# Patient Record
Sex: MALE | Race: WHITE | NOT HISPANIC OR LATINO | Employment: FULL TIME | URBAN - METROPOLITAN AREA
[De-identification: names, ages, dates, MRNs, and addresses within clinical notes are randomized per-mention and may not be internally consistent; named-entity substitution may affect disease eponyms.]

---

## 2017-03-03 ENCOUNTER — TELEPHONE (OUTPATIENT)
Dept: NEUROSURGERY | Facility: CLINIC | Age: 42
End: 2017-03-03

## 2017-03-03 DIAGNOSIS — M48.061 SPINAL STENOSIS, LUMBAR REGION, WITHOUT NEUROGENIC CLAUDICATION: ICD-10-CM

## 2017-03-03 DIAGNOSIS — Z98.890 POST-OPERATIVE STATE: Primary | ICD-10-CM

## 2017-03-03 NOTE — TELEPHONE ENCOUNTER
We have received a request from   for the patient to respond to questions regarding MMI/PPI related to his WC injury. His surgery was 3-6-16. We have received payment for this.    However, Dr. Mackay wants to see the patient in the office (now/soon) for evaluation prior to giving his final opinion of the patient's status with lumbar flex/extension xrays.     Appt made for 3-17-17 at 1:30 with Dr. Mackay.  Order in. Please notify patient of this appointment/xrays prior and contact his WC carrier to let them know of this final appointment.

## 2017-03-17 ENCOUNTER — HOSPITAL ENCOUNTER (OUTPATIENT)
Dept: GENERAL RADIOLOGY | Facility: HOSPITAL | Age: 42
Discharge: HOME | End: 2017-03-17
Attending: NEUROLOGICAL SURGERY | Admitting: NEUROLOGICAL SURGERY

## 2017-03-17 ENCOUNTER — OFFICE VISIT (OUTPATIENT)
Dept: NEUROSURGERY | Facility: CLINIC | Age: 42
End: 2017-03-17

## 2017-03-17 VITALS
HEART RATE: 72 BPM | BODY MASS INDEX: 33.88 KG/M2 | HEIGHT: 71 IN | WEIGHT: 242 LBS | DIASTOLIC BLOOD PRESSURE: 88 MMHG | SYSTOLIC BLOOD PRESSURE: 120 MMHG

## 2017-03-17 DIAGNOSIS — Z98.890 POST-OPERATIVE STATE: Primary | ICD-10-CM

## 2017-03-17 DIAGNOSIS — Z98.890 POST-OPERATIVE STATE: ICD-10-CM

## 2017-03-17 DIAGNOSIS — M51.9 LUMBOSACRAL DISC DISEASE: ICD-10-CM

## 2017-03-17 DIAGNOSIS — M48.061 SPINAL STENOSIS, LUMBAR REGION, WITHOUT NEUROGENIC CLAUDICATION: ICD-10-CM
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
## 2017-03-17 PROCEDURE — 72114 X-RAY EXAM L-S SPINE BENDING: CPT

## 2017-03-17 PROCEDURE — 99212 OFFICE O/P EST SF 10 MIN: CPT | Performed by: NEUROLOGICAL SURGERY

## 2017-03-17 NOTE — PROGRESS NOTES
Subjective   Patient ID: Pranav Calderon is a 41 y.o. male who is here today for follow-up for a work comp injury. One year MMI. He has a lumbar xray today at [facility]. He presents unaccompanied for this visit today.     History of Present Illness     The patient returns to the office now approximately 1 year following a L4 5 lumbar decompression and interbody fusion with posterior instrumentation.  He operatively has severe low back pain and severe pain in the right lower extremity.  He has had complete resolution of leg pain.  He notes that he has most of the time at least 9/10 of the time good resolution of low back pain but after being up for a full day of work isn't an walking he has some back pain.  He rates the back pain intermittently worse as 3 out of 10.  This pain is not present on a daily basis.  It does not significantly impair his ability to conduct his daily activities.  He is working full time at full duty.    The following portions of the patient's history were reviewed and updated as appropriate: allergies, current medications, past family history, past medical history, past social history, past surgical history and problem list.    Review of Systems   Musculoskeletal: Negative for back pain.   Neurological: Negative for numbness.   Psychiatric/Behavioral: Negative for sleep disturbance.       Objective   Physical Exam   Constitutional: He is oriented to person, place, and time. He appears well-developed and well-nourished. He is cooperative.   HENT:   Head: Normocephalic and atraumatic.   Eyes: EOM are normal. Pupils are equal, round, and reactive to light. No scleral icterus.   Neck: Normal range of motion. Neck supple.   Cardiovascular: Normal rate, regular rhythm and intact distal pulses.    No murmur heard.  Pulmonary/Chest: Effort normal and breath sounds normal.   Abdominal: Soft. Bowel sounds are normal. There is no tenderness.   Musculoskeletal:        Lumbar back: He exhibits  decreased range of motion.        Back:    Neurological: He is alert and oriented to person, place, and time. He has normal strength. He displays no atrophy. No cranial nerve deficit or sensory deficit. He exhibits normal muscle tone. He displays a negative Romberg sign. Coordination and gait normal. GCS eye subscore is 4. GCS verbal subscore is 5. GCS motor subscore is 6.   Reflex Scores:       Tricep reflexes are 2+ on the right side and 2+ on the left side.       Bicep reflexes are 2+ on the right side and 2+ on the left side.       Brachioradialis reflexes are 2+ on the right side and 2+ on the left side.       Patellar reflexes are 2+ on the right side and 2+ on the left side.       Achilles reflexes are 2+ on the right side and 2+ on the left side.  Negative Milner and clonus  Finger to nose intact   Heel to shin intact  Able to tandem walk  Able to walk on heels and toes   Skin: Skin is warm, dry and intact.   Psychiatric: He has a normal mood and affect. His speech is normal and behavior is normal. Judgment and thought content normal. Cognition and memory are normal.   Vitals reviewed.    Neurologic Exam     Mental Status   Oriented to person, place, and time.   Speech: speech is normal     Cranial Nerves     CN III, IV, VI   Pupils are equal, round, and reactive to light.  Extraocular motions are normal.     Motor Exam     Strength   Strength 5/5 throughout.     Gait, Coordination, and Reflexes     Reflexes   Right brachioradialis: 2+  Left brachioradialis: 2+  Right biceps: 2+  Left biceps: 2+  Right triceps: 2+  Left triceps: 2+  Right patellar: 2+  Left patellar: 2+  Right achilles: 2+  Left achilles: 2+      Assessment/Plan   Independent Review of Radiographic Studies:    X-rays of the lumbar spine were accomplished today including flexion extension and I personally reviewed than: These demonstrate spinal instrumentation at L4 5 with an interbody prosthetic device.  There is ongoing interbody fusion.   There is no evidence of spinal instability.  Medical Decision Making:    Patient is doing remarkably well.  In my opinion he has reached maximal medical improvement.  A permanent partial impairment rating may be requested.  He has no permanent restrictions.    Pranav was seen today for back pain.    Diagnoses and all orders for this visit:    Post-operative state    Lumbosacral disc disease    Work related injury    Return if symptoms worsen or fail to improve.

## 2018-03-16 ENCOUNTER — OFFICE VISIT CONVERTED (OUTPATIENT)
Dept: FAMILY MEDICINE CLINIC | Age: 43
End: 2018-03-16
Attending: FAMILY MEDICINE

## 2018-10-22 ENCOUNTER — OFFICE VISIT CONVERTED (OUTPATIENT)
Dept: FAMILY MEDICINE CLINIC | Age: 43
End: 2018-10-22
Attending: NURSE PRACTITIONER

## 2020-08-04 ENCOUNTER — OFFICE VISIT CONVERTED (OUTPATIENT)
Dept: FAMILY MEDICINE CLINIC | Age: 45
End: 2020-08-04
Attending: NURSE PRACTITIONER

## 2020-08-05 ENCOUNTER — HOSPITAL ENCOUNTER (OUTPATIENT)
Dept: OTHER | Facility: HOSPITAL | Age: 45
Discharge: HOME | End: 2020-08-05
Attending: NURSE PRACTITIONER

## 2020-08-05 LAB
ERYTHROCYTE [DISTWIDTH] IN BLOOD BY AUTOMATED COUNT: 11.9 % (ref 11.5–14.5)
HBA1C MFR BLD: 16.3 G/DL (ref 14–18)
HCT VFR BLD AUTO: 48.7 % (ref 42–52)
MCH RBC QN AUTO: 31.2 PG (ref 27–31)
MCHC RBC AUTO-ENTMCNC: 33.5 G/DL (ref 33–37)
MCV RBC AUTO: 93.3 FL (ref 80–96)
PLATELET # BLD AUTO: 199 10*3/UL (ref 130–400)
PMV BLD AUTO: 11.4 FL (ref 7.4–10.4)
RBC # BLD AUTO: 5.22 10*6/UL (ref 4.7–6.1)
WBC # BLD AUTO: 5.98 10*3/UL (ref 4.8–10.8)

## 2020-08-06 LAB
ALBUMIN SERPL-MCNC: 4.2 G/DL (ref 3.5–5)
ALBUMIN/GLOB SERPL: 1.4 {RATIO} (ref 1.4–2.6)
ALP SERPL-CCNC: 43 U/L (ref 53–128)
ALT SERPL-CCNC: 70 U/L (ref 10–40)
ANION GAP SERPL CALC-SCNC: 22 MMOL/L (ref 8–19)
AST SERPL-CCNC: 51 U/L (ref 15–50)
BILIRUB SERPL-MCNC: 0.43 MG/DL (ref 0.2–1.3)
BUN SERPL-MCNC: 11 MG/DL (ref 5–25)
BUN/CREAT SERPL: 11 {RATIO} (ref 6–20)
CALCIUM SERPL-MCNC: 10.1 MG/DL (ref 8.7–10.4)
CHLORIDE SERPL-SCNC: 102 MMOL/L (ref 99–111)
CHOLEST SERPL-MCNC: 172 MG/DL (ref 107–200)
CHOLEST/HDLC SERPL: 3.3 {RATIO} (ref 3–6)
CONV CO2: 22 MMOL/L (ref 22–32)
CONV TOTAL PROTEIN: 7.3 G/DL (ref 6.3–8.2)
CREAT UR-MCNC: 0.96 MG/DL (ref 0.7–1.2)
GFR SERPLBLD BASED ON 1.73 SQ M-ARVRAT: >60 ML/MIN/{1.73_M2}
GLOBULIN UR ELPH-MCNC: 3.1 G/DL (ref 2–3.5)
GLUCOSE SERPL-MCNC: 91 MG/DL (ref 70–99)
HDLC SERPL-MCNC: 52 MG/DL (ref 40–60)
LDLC SERPL CALC-MCNC: 95 MG/DL (ref 70–100)
OSMOLALITY SERPL CALC.SUM OF ELEC: 291 MOSM/KG (ref 273–304)
POTASSIUM SERPL-SCNC: 4.6 MMOL/L (ref 3.5–5.3)
SODIUM SERPL-SCNC: 141 MMOL/L (ref 135–147)
TRIGL SERPL-MCNC: 125 MG/DL (ref 40–150)
TSH SERPL-ACNC: 2.01 M[IU]/L (ref 0.27–4.2)
VLDLC SERPL-MCNC: 25 MG/DL (ref 5–37)

## 2020-10-08 ENCOUNTER — CONVERSION ENCOUNTER (OUTPATIENT)
Dept: FAMILY MEDICINE CLINIC | Age: 45
End: 2020-10-08

## 2020-10-08 ENCOUNTER — HOSPITAL ENCOUNTER (OUTPATIENT)
Dept: OTHER | Facility: HOSPITAL | Age: 45
Discharge: HOME | End: 2020-10-08
Attending: FAMILY MEDICINE

## 2020-10-12 LAB — SARS-COV-2 RNA SPEC QL NAA+PROBE: DETECTED

## 2020-10-21 ENCOUNTER — LAB REQUISITION (OUTPATIENT)
Dept: LAB | Facility: HOSPITAL | Age: 45
End: 2020-10-21

## 2020-10-21 DIAGNOSIS — Z00.00 ENCOUNTER FOR GENERAL ADULT MEDICAL EXAMINATION WITHOUT ABNORMAL FINDINGS: ICD-10-CM

## 2020-10-29 ENCOUNTER — OFFICE VISIT CONVERTED (OUTPATIENT)
Dept: FAMILY MEDICINE CLINIC | Age: 45
End: 2020-10-29
Attending: NURSE PRACTITIONER

## 2020-11-10 ENCOUNTER — OFFICE VISIT CONVERTED (OUTPATIENT)
Dept: CARDIOLOGY | Facility: CLINIC | Age: 45
End: 2020-11-10
Attending: INTERNAL MEDICINE

## 2020-11-19 ENCOUNTER — CONVERSION ENCOUNTER (OUTPATIENT)
Dept: CARDIOLOGY | Facility: CLINIC | Age: 45
End: 2020-11-19
Attending: INTERNAL MEDICINE

## 2021-01-07 ENCOUNTER — LAB REQUISITION (OUTPATIENT)
Dept: LAB | Facility: HOSPITAL | Age: 46
End: 2021-01-07

## 2021-01-07 DIAGNOSIS — Z00.00 ENCOUNTER FOR GENERAL ADULT MEDICAL EXAMINATION WITHOUT ABNORMAL FINDINGS: ICD-10-CM

## 2021-01-09 PROCEDURE — U0004 COV-19 TEST NON-CDC HGH THRU: HCPCS | Performed by: PODIATRIST

## 2021-01-11 LAB — SARS-COV-2 RNA RESP QL NAA+PROBE: NOT DETECTED

## 2021-01-28 ENCOUNTER — PREP FOR SURGERY (OUTPATIENT)
Dept: SURGERY | Facility: SURGERY CENTER | Age: 46
End: 2021-01-28

## 2021-01-28 DIAGNOSIS — M72.2 PLANTAR FASCIITIS: Primary | ICD-10-CM

## 2021-02-02 NOTE — H&P (VIEW-ONLY)
Patient Care Team:  Fauzia Ballard MD as PCP - General (Family Medicine)  Meri Alex MD as Consulting Physician (Physical Medicine and Rehabilitation)  Celestino Mackay MD as Surgeon (Neurosurgery)    Chief complaint Heel pain left    Subjective     1+ year history of heel pain L non responsive to conservative care      Review of Systems   Constitutional: Negative.    HENT: Negative.    Eyes: Negative.    Respiratory: Negative.    Cardiovascular: Negative.    Gastrointestinal: Negative.    Endocrine: Negative.    Genitourinary: Negative.    Skin: Negative.    Allergic/Immunologic: Negative.    Neurological: Negative.    Hematological: Negative.    Psychiatric/Behavioral: Negative.         Past History:  Medical History: has a past medical history of Anxiety, Back pain, Depression, Lumbar herniated disc, Lumbosacral disc disease, Obesity, Obstructive sleep apnea, Right lumbar radiculopathy, and Work related injury (10/30/2014).   Surgical History: has a past surgical history that includes Spine surgery; [place] tooth extraction; Lumbar fusion (Right, 3/9/2016); and Lumbar Decompression (03/09/2016).   Family History: family history includes Diabetes in his father.   Social History: reports that he has never smoked. He has never used smokeless tobacco. He reports that he does not drink alcohol or use drugs.    (Not in a hospital admission)     Allergies: Patient has no known allergies.    Objective      Vital Signs  BP: ()/()   Arterial Line BP: ()/()     Physical Exam  HENT:      Head: Normocephalic and atraumatic.   Neck:      Musculoskeletal: Normal range of motion and neck supple.   Cardiovascular:      Rate and Rhythm: Normal rate and regular rhythm.   Pulmonary:      Effort: Pulmonary effort is normal.      Breath sounds: Normal breath sounds.   Musculoskeletal: Normal range of motion.   Feet:      Comments: + POP L heel  Skin:     General: Skin is warm and dry.      Capillary Refill:  Capillary refill takes less than 2 seconds.   Neurological:      General: No focal deficit present.      Mental Status: He is alert and oriented to person, place, and time.         Results Review:   I reviewed the patient's new clinical results.      Assessment/Plan       * No active hospital problems. *      Assessment:  (Plantar fasciitis Left).     Plan:   (EPF left).       I discussed the patients findings and my recommendations with patient    Rafy Bauer DPM  02/02/21  08:44 EST    Time: Discharge 10 min

## 2021-02-02 NOTE — H&P
Patient Care Team:  Fauzia Ballard MD as PCP - General (Family Medicine)  Meri Alex MD as Consulting Physician (Physical Medicine and Rehabilitation)  Celestino Mackay MD as Surgeon (Neurosurgery)    Chief complaint Heel pain left    Subjective     1+ year history of heel pain L non responsive to conservative care      Review of Systems   Constitutional: Negative.    HENT: Negative.    Eyes: Negative.    Respiratory: Negative.    Cardiovascular: Negative.    Gastrointestinal: Negative.    Endocrine: Negative.    Genitourinary: Negative.    Skin: Negative.    Allergic/Immunologic: Negative.    Neurological: Negative.    Hematological: Negative.    Psychiatric/Behavioral: Negative.         Past History:  Medical History: has a past medical history of Anxiety, Back pain, Depression, Lumbar herniated disc, Lumbosacral disc disease, Obesity, Obstructive sleep apnea, Right lumbar radiculopathy, and Work related injury (10/30/2014).   Surgical History: has a past surgical history that includes Spine surgery; [place] tooth extraction; Lumbar fusion (Right, 3/9/2016); and Lumbar Decompression (03/09/2016).   Family History: family history includes Diabetes in his father.   Social History: reports that he has never smoked. He has never used smokeless tobacco. He reports that he does not drink alcohol or use drugs.    (Not in a hospital admission)     Allergies: Patient has no known allergies.    Objective      Vital Signs  BP: ()/()   Arterial Line BP: ()/()     Physical Exam  HENT:      Head: Normocephalic and atraumatic.   Neck:      Musculoskeletal: Normal range of motion and neck supple.   Cardiovascular:      Rate and Rhythm: Normal rate and regular rhythm.   Pulmonary:      Effort: Pulmonary effort is normal.      Breath sounds: Normal breath sounds.   Musculoskeletal: Normal range of motion.   Feet:      Comments: + POP L heel  Skin:     General: Skin is warm and dry.      Capillary Refill:  Capillary refill takes less than 2 seconds.   Neurological:      General: No focal deficit present.      Mental Status: He is alert and oriented to person, place, and time.         Results Review:   I reviewed the patient's new clinical results.      Assessment/Plan       * No active hospital problems. *      Assessment:  (Plantar fasciitis Left).     Plan:   (EPF left).       I discussed the patients findings and my recommendations with patient    Rafy Bauer DPM  02/02/21  08:44 EST    Time: Discharge 10 min

## 2021-02-06 ENCOUNTER — LAB (OUTPATIENT)
Dept: LAB | Facility: SURGERY CENTER | Age: 46
End: 2021-02-06

## 2021-02-06 ENCOUNTER — TRANSCRIBE ORDERS (OUTPATIENT)
Dept: SURGERY | Facility: SURGERY CENTER | Age: 46
End: 2021-02-06

## 2021-02-06 DIAGNOSIS — Z01.818 OTHER SPECIFIED PRE-OPERATIVE EXAMINATION: Primary | ICD-10-CM

## 2021-02-06 DIAGNOSIS — Z01.818 OTHER SPECIFIED PRE-OPERATIVE EXAMINATION: ICD-10-CM

## 2021-02-06 LAB — SARS-COV-2 ORF1AB RESP QL NAA+PROBE: NOT DETECTED

## 2021-02-06 PROCEDURE — U0004 COV-19 TEST NON-CDC HGH THRU: HCPCS | Performed by: SURGERY

## 2021-02-06 PROCEDURE — C9803 HOPD COVID-19 SPEC COLLECT: HCPCS

## 2021-02-08 NOTE — PAT
Education provided the Patient on the following:    - Nothing to Eat or Drink after MN the night before the procedure  -Your required COVID Test is Scheduled on          Between the Hours of 9651-1084  -You will only be notified if your COVID test Result is POSITIVE  -The importance of reducing your number of contacts by self quarantining after you COVID test until the date of your Surgery  -You will need to have someone drive you home after your Surgery and remain with you for 24 hours after the EGD  - The date of your Surgery, your ride is welcome to either remain in our parking lot or within 10-15 minutes of [employer]  -Please wear warm socks when you arrive for your Surgery  -Remove all jewelry and leave any valuables before arriving on the date of your procedure (all will have to be removed before leaving preop)  -You will need to arrive at 0600  on  2/9/21 for your Surgery  -Feel free to contact us at: [phone] with any additional questions/concerns      Patient states he had COVID 10/9/20

## 2021-02-09 ENCOUNTER — HOSPITAL ENCOUNTER (OUTPATIENT)
Facility: SURGERY CENTER | Age: 46
Setting detail: HOSPITAL OUTPATIENT SURGERY
Discharge: HOME | End: 2021-02-09
Attending: PODIATRIST | Admitting: PODIATRIST

## 2021-02-09 ENCOUNTER — ANESTHESIA (OUTPATIENT)
Dept: SURGERY | Facility: SURGERY CENTER | Age: 46
End: 2021-02-09

## 2021-02-09 ENCOUNTER — ANESTHESIA EVENT (OUTPATIENT)
Dept: SURGERY | Facility: SURGERY CENTER | Age: 46
End: 2021-02-09

## 2021-02-09 VITALS
TEMPERATURE: 97.7 F | BODY MASS INDEX: 40.14 KG/M2 | WEIGHT: 271 LBS | OXYGEN SATURATION: 95 % | HEART RATE: 104 BPM | HEIGHT: 69 IN | SYSTOLIC BLOOD PRESSURE: 130 MMHG | DIASTOLIC BLOOD PRESSURE: 88 MMHG | RESPIRATION RATE: 20 BRPM

## 2021-02-09 DIAGNOSIS — M72.2 PLANTAR FASCIITIS, LEFT: Primary | ICD-10-CM

## 2021-02-09 PROCEDURE — 25010000002 MIDAZOLAM PER 1MG: Performed by: ANESTHESIOLOGY

## 2021-02-09 PROCEDURE — 25010000003 CEFAZOLIN PER 500 MG: Performed by: PODIATRIST

## 2021-02-09 PROCEDURE — 25010000002 PROPOFOL 10 MG/ML EMULSION: Performed by: ANESTHESIOLOGY

## 2021-02-09 PROCEDURE — 25010000003 LIDOCAINE 1 % SOLUTION: Performed by: PODIATRIST

## 2021-02-09 PROCEDURE — 29893 ENDOSCOPIC PLANTR FASCIOTOMY: CPT | Performed by: PODIATRIST

## 2021-02-09 RX ORDER — PROPOFOL 10 MG/ML
VIAL (ML) INTRAVENOUS AS NEEDED
Status: DISCONTINUED | OUTPATIENT
Start: 2021-02-09 | End: 2021-02-09 | Stop reason: SURG

## 2021-02-09 RX ORDER — MIDAZOLAM HYDROCHLORIDE 1 MG/ML
1 INJECTION INTRAMUSCULAR; INTRAVENOUS
Status: DISCONTINUED | OUTPATIENT
Start: 2021-02-09 | End: 2021-02-09 | Stop reason: HOSPADM

## 2021-02-09 RX ORDER — LIDOCAINE HYDROCHLORIDE 20 MG/ML
INJECTION, SOLUTION INFILTRATION; PERINEURAL AS NEEDED
Status: DISCONTINUED | OUTPATIENT
Start: 2021-02-09 | End: 2021-02-09 | Stop reason: SURG

## 2021-02-09 RX ORDER — CEFAZOLIN SODIUM 1 G/3ML
2 INJECTION, POWDER, FOR SOLUTION INTRAMUSCULAR; INTRAVENOUS ONCE
Status: COMPLETED | OUTPATIENT
Start: 2021-02-09 | End: 2021-02-09

## 2021-02-09 RX ORDER — MIDAZOLAM HYDROCHLORIDE 1 MG/ML
2 INJECTION INTRAMUSCULAR; INTRAVENOUS
Status: DISCONTINUED | OUTPATIENT
Start: 2021-02-09 | End: 2021-02-09 | Stop reason: HOSPADM

## 2021-02-09 RX ORDER — HYDROCODONE BITARTRATE AND ACETAMINOPHEN 5; 325 MG/1; MG/1
1-2 TABLET ORAL EVERY 4 HOURS PRN
Qty: 25 TABLET | Refills: 0
Start: 2021-02-09 | End: 2022-04-26

## 2021-02-09 RX ORDER — MAGNESIUM HYDROXIDE 1200 MG/15ML
LIQUID ORAL AS NEEDED
Status: DISCONTINUED | OUTPATIENT
Start: 2021-02-09 | End: 2021-02-09 | Stop reason: HOSPADM

## 2021-02-09 RX ORDER — FENTANYL CITRATE 50 UG/ML
50 INJECTION, SOLUTION INTRAMUSCULAR; INTRAVENOUS
Status: DISCONTINUED | OUTPATIENT
Start: 2021-02-09 | End: 2021-02-09 | Stop reason: HOSPADM

## 2021-02-09 RX ORDER — PROPOFOL 10 MG/ML
VIAL (ML) INTRAVENOUS CONTINUOUS PRN
Status: DISCONTINUED | OUTPATIENT
Start: 2021-02-09 | End: 2021-02-09 | Stop reason: SURG

## 2021-02-09 RX ORDER — SODIUM CHLORIDE, SODIUM LACTATE, POTASSIUM CHLORIDE, CALCIUM CHLORIDE 600; 310; 30; 20 MG/100ML; MG/100ML; MG/100ML; MG/100ML
9 INJECTION, SOLUTION INTRAVENOUS CONTINUOUS PRN
Status: DISCONTINUED | OUTPATIENT
Start: 2021-02-09 | End: 2021-02-09 | Stop reason: HOSPADM

## 2021-02-09 RX ORDER — BUPIVACAINE HYDROCHLORIDE 5 MG/ML
INJECTION, SOLUTION PERINEURAL AS NEEDED
Status: DISCONTINUED | OUTPATIENT
Start: 2021-02-09 | End: 2021-02-09 | Stop reason: HOSPADM

## 2021-02-09 RX ORDER — SODIUM CHLORIDE 0.9 % (FLUSH) 0.9 %
10 SYRINGE (ML) INJECTION AS NEEDED
Status: DISCONTINUED | OUTPATIENT
Start: 2021-02-09 | End: 2021-02-09 | Stop reason: HOSPADM

## 2021-02-09 RX ORDER — SODIUM CHLORIDE, SODIUM LACTATE, POTASSIUM CHLORIDE, CALCIUM CHLORIDE 600; 310; 30; 20 MG/100ML; MG/100ML; MG/100ML; MG/100ML
9 INJECTION, SOLUTION INTRAVENOUS CONTINUOUS
Status: DISCONTINUED | OUTPATIENT
Start: 2021-02-09 | End: 2021-02-09 | Stop reason: HOSPADM

## 2021-02-09 RX ORDER — LIDOCAINE HYDROCHLORIDE 10 MG/ML
INJECTION, SOLUTION INFILTRATION; PERINEURAL AS NEEDED
Status: DISCONTINUED | OUTPATIENT
Start: 2021-02-09 | End: 2021-02-09 | Stop reason: HOSPADM

## 2021-02-09 RX ORDER — SODIUM CHLORIDE 0.9 % (FLUSH) 0.9 %
10 SYRINGE (ML) INJECTION EVERY 12 HOURS SCHEDULED
Status: DISCONTINUED | OUTPATIENT
Start: 2021-02-09 | End: 2021-02-09 | Stop reason: HOSPADM

## 2021-02-09 RX ADMIN — PROPOFOL 100 MG: 10 INJECTION, EMULSION INTRAVENOUS at 07:36

## 2021-02-09 RX ADMIN — SODIUM CHLORIDE, POTASSIUM CHLORIDE, SODIUM LACTATE AND CALCIUM CHLORIDE 9 ML/HR: 600; 310; 30; 20 INJECTION, SOLUTION INTRAVENOUS at 06:29

## 2021-02-09 RX ADMIN — FAMOTIDINE 20 MG: 10 INJECTION INTRAVENOUS at 07:23

## 2021-02-09 RX ADMIN — MIDAZOLAM HYDROCHLORIDE 2 MG: 2 INJECTION, SOLUTION INTRAMUSCULAR; INTRAVENOUS at 07:24

## 2021-02-09 RX ADMIN — SODIUM CHLORIDE, SODIUM LACTATE, POTASSIUM CHLORIDE, AND CALCIUM CHLORIDE: .6; .31; .03; .02 INJECTION, SOLUTION INTRAVENOUS at 07:36

## 2021-02-09 RX ADMIN — CEFAZOLIN 2 G: 330 INJECTION, POWDER, FOR SOLUTION INTRAMUSCULAR; INTRAVENOUS at 07:39

## 2021-02-09 RX ADMIN — LIDOCAINE HYDROCHLORIDE 60 MG: 20 INJECTION, SOLUTION INFILTRATION; PERINEURAL at 07:36

## 2021-02-09 RX ADMIN — PROPOFOL 160 MCG/KG/MIN: 10 INJECTION, EMULSION INTRAVENOUS at 07:36

## 2021-02-09 NOTE — BRIEF OP NOTE
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

## 2021-02-09 NOTE — OP NOTE
The patient was brought into the operating room and placed on the operating room table in a supine position the patient was then placed under monitored anesthesia. A local block consisting of 20cc of a 1 to 1 mixture of 1% lidocaine plain and 0.5% Marcaine plain was then locally infiltrated.   The foot was in scrubbed prepped and draped in the usual aseptic manner. The foot was exsanguinated the pneumatic Ankle tourniquet was then inflated. Our attention was then directed to the medial aspect of the left foot. A 1 cm stab incision was then created 1 cm distal to the plantar fascial insertion into the calcaneus. Our dissection was continue to deep using both sharp and blunt dissection all bleeders were cauterized and located as necessary. A fascial elevator was then introduced and a fascial tunnel was created along the planter aspect of the planter fascia until lateral tenting was noted. A trocar and cannula were then placed into the tunnel until lateral tenting was noted. A second stab incision was made laterally. The trocar and cannula were passed through. The trocar was then removed the cannula was swabbed the camera was introduced the planter fascia was easily visualized. The medial 50% of the plantar fascia was then transacted using a triangle blade until the underlying muscle belly was observed with the foot held in a dorsiflex position. The cannula was rotated 180° no further plantar fascia elements were noted. The cannula was then flushed with normal saline solution. The trocar was re-introduced and the trocar and cannula were removed in one piece. The incisions were then co-opted with 5-0 nylon dry sterile dressing was applied. The foot was then casted with a fiberglass cast with the foot held at a 90° angle to the leg. The patient tolerated the procedure well and will be allowed to partial weight bear throughout the postoperative period and follow up with myself in the office in two weeks.

## 2021-02-09 NOTE — ANESTHESIA PREPROCEDURE EVALUATION
Anesthesia Evaluation                  Airway   Mallampati: I  TM distance: <3 FB  Neck ROM: full  no difficulty expected  Dental      Pulmonary - normal exam   (+) sleep apnea (NO CPAP),   Cardiovascular - normal exam        Neuro/Psych  (+) neuromuscular disease,     GI/Hepatic/Renal/Endo    (+) morbid obesity,      Musculoskeletal     Abdominal    Substance History      OB/GYN          Other                          Anesthesia Plan    ASA 3     MAC     intravenous induction     Anesthetic plan, all risks, benefits, and alternatives have been provided, discussed and informed consent has been obtained with: patient.

## 2021-02-09 NOTE — ANESTHESIA POSTPROCEDURE EVALUATION
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])    Surgeon: Rafy Bauer DPM Provider: Delmar Man MD    Anesthesia Type: MAC ASA Status: 3          Anesthesia Type: MAC    Vitals  Vitals Value Taken Time   /88 02/09/21 0838   Temp 36.5 °C (97.7 °F) 02/09/21 0823   Pulse 104 02/09/21 0838   Resp 20 02/09/21 0838   SpO2 95 % 02/09/21 0838           Post Anesthesia Care and Evaluation    Patient location during evaluation: bedside  Patient participation: complete - patient participated  Level of consciousness: awake and alert  Pain management: adequate  Airway patency: patent  Anesthetic complications: No anesthetic complications    Cardiovascular status: acceptable  Respiratory status: acceptable  Hydration status: acceptable    Comments: /88 (BP Location: Left arm, Patient Position: Sitting)   Pulse 104   Temp 36.5 °C (97.7 °F) (Temporal)   Resp 20   Ht 175.3 cm (69\")   Wt 123 kg (271 lb)   SpO2 95%   BMI 40.02 kg/m²       "

## 2021-05-10 NOTE — PROCEDURES
"   Procedure Note      Patient Name: Pranav Calderon   Patient ID: [id number]   Sex: Male   YOB: 1975    Primary Care Provider: Kristin MAGANA   Referring Provider: Kristin MAGANA    Visit Date: November 19, 2020    Provider: Thomas Block MD   Location: [facility]   Location Address: [address] [place] [address]  [address] [place] [address]  [id number]   Location Phone: [phone]          FINAL REPORT   TRANSTHORACIC ECHOCARDIOGRAM REPORT    Diagnosis: Cardiomyopathy   Height: 5'10\" Weight: 250 B/P: BLOOD PRESSURE BSA: 2.3   Tech: JLW   MEASUREMENTS:  RVID (Diastole) : RVID. (NORMAL: 0.7 to 2.4 cm max)   LVID (Systole): 3.3 cm (Diastole): 4.8 cm . (NORMAL: 3.7 - 5.4 cm)   Posterior Wall Thickness (Diastole): 1.1 cm. (NORMAL: 0.8 - 1.1 cm)   Septal Thickness (Diastole): 1.1 cm. (NORMAL: 0.7 - 1.2 cm)   LAID (Systole): 3.4 cm. (NORMAL: 1.9 - 3.8 cm)   Aortic Root Diameter (Diastole): 2.8 cm. (NORMAL: 2.0 - 3.7 cm)   COMMENTS:  The patient underwent 2-D, M-Mode, and Doppler examination, including pulse-wave, continuous-wave, and color-flow analysis; the study is technically adequate.   FINDINGS:  MITRAL VALVE: Normal in appearance, opens well. No evidence of mitral valve prolapse. No mitral stenosis. Trace mitral regurgitation.   AORTIC VALVE: Normal trileaflet aortic valve, opens well. No evidence of aortic stenosis or regurgitation on Doppler examination.   TRICUSPID VALVE: Normal in appearance, opens well. Trace tricuspid regurgitation. Normal pulmonary artery systolic pressure.   PULMONIC VALVE: Grossly normal.   AORTIC ROOT: Normal in size with adequate motion.   LEFT ATRIUM: Normal in size. No intracavitary masses or clots seen. LA volume index is 24 mL/m2.   LEFT VENTRICLE: The left ventricular chamber size is normal. The left ventricular wall thickness is normal. Left ventricular systolic function is normal. Estimated LV ejection fraction is 55%. There are no regional wall " motion abnormalities. There is grade 1 diastolic dysfunction, impaired relaxation of the left ventricle.   RIGHT VENTRICLE: Normal size and function.   RIGHT ATRIUM: Normal in size.   PERICARDIUM: No evidence of pericardial effusion.   INFERIOR VENA CAVA: Measures 1.9 cm in diameter and there is more than 50% collapse during inspiration.   DOPPLER: E/A ratio is 0.9. DT= 2.2 msec. IVRT is 90 msec. E/E' is 8.   Faxed: 11/23/2020      CONCLUSION:  1.  Normal left ventricular systolic function with estimated LV ejection fraction of 55%.   2.  Grade 1 diastolic dysfunction of the left ventricle.   3.  No hemodynamically significant valvular abnormalities.       MD PAUL Otero/faustino    This note was transcribed by Harika Marie.  faustino/paul  The above service was transcribed by Harika Marie, and I attest to the accuracy of the note.  PAUL                 Electronically Signed by: Kasia Marie-, Other -Author on November 23, 2020 08:41:58 AM  Electronically Co-signed by: Thomas Block MD -Reviewer on November 23, 2020 10:10:06 AM

## 2021-05-10 NOTE — H&P
History and Physical      Patient Name: Pranav Calderon   Patient ID: [id number]   Sex: Male   YOB: 1975    Primary Care Provider: Kristin MAGANA   Referring Provider: Kristin MAGANA    Visit Date: November 10, 2020    Provider: Thomas Block MD   Location: [facility]   Location Address: [address] [place] [address]  [address]  [place], [place]  [id number]   Location Phone: [phone]          Chief Complaint  · REASON FOR CONSULTATION: Abnormal echocardiogram       History Of Present Illness  Consult requested by: Kristin MAGANA   Pranav Calderon is a 45-year-old male with no previous past medical history. He was admitted to [facility] in mid-October with COVID-19 pneumonia and sepsis. He had a prolonged and complicated stay, including stay in ICU and non-invasive ventilation. Since EKG showed non-specific changes, cardiology was consulted. Echocardiogram was done. The echocardiogram was reported after discharge, which showed ejection fraction 40-45%. Currently patient recovered well from the acute illness. He still has some cough, which is not productive. No shortness of breath. He denies having any chest pain. No pedal edema. No palpitations, dizziness or syncopal episodes.   PAST MEDICAL HISTORY: (1) Recent COVID-19 pneumonia. (2) Negative for diabetes, hypertension or hyperlipidemia.   PSYCHOSOCIAL HISTORY: Denies tobacco or alcohol use. No recreational drug usage.   FAMILY HISTORY: was reviewed. No family history of premature coronary artery disease.   CURRENT MEDICATIONS: include Bromphen 10 mL p.r.n. The dosage and frequency of the medication was reviewed with the patient.   ALLERGIES: No known drug allergies.       Review of Systems  · Constitutional  o Admits  o : fatigue, good general health lately, recent weight changes   · Eyes  o Denies  o : double vision  · HENT  o Admits  o : swollen glands in neck  o Denies  o : hearing loss or ringing, chronic sinus  "problem  · Cardiovascular  o Admits  o : chest pain  o Denies  o : palpitations (fast, fluttering, or skipping beats), swelling (feet, ankles, hands), shortness of breath while walking or lying flat  · Respiratory  o Denies  o : chronic or frequent cough, asthma or wheezing, COPD  · Gastrointestinal  o Admits  o : ulcers  o Denies  o : nausea or vomiting  · Neurologic  o Denies  o : lightheaded or dizzy, stroke, headaches  · Musculoskeletal  o Denies  o : joint pain, back pain  · Endocrine  o Denies  o : thyroid disease, diabetes, heat or cold intolerance, excessive thirst or urination  · Heme-Lymph  o Denies  o : bleeding or bruising tendency, anemia      Vitals  Date Time BP Position Site L\R Cuff Size HR RR TEMP (F) WT  HT  BMI kg/m2 BSA m2 O2 Sat FR L/min FiO2 HC       11/10/2020 02:39 /92 Sitting    113 - R   250lbs 0oz 5'  10\" 35.87 2.37             Physical Examination  · Constitutional  o Appearance  o : Awake, alert, in no acute distress.  · Head and Face  o HEENT  o : No pallor, anicteric. Eyes normal. Moist mucous membranes.  · Neck  o Inspection/Palpation  o : Supple. No hepatosplenomegaly.  o Jugular Veins  o : No JVD. No carotid bruits.  · Respiratory  o Auscultation of Lungs  o : Clear to auscultation bilaterally. No crackles or wheezing.  · Cardiovascular  o Heart  o : S1, S2 is normally heard. No S3. No murmur, rubs, or gallops.  · Gastrointestinal  o Abdominal Examination  o : Soft, non-distended. No palpable hepatosplenomegaly. Bowel sounds heard in all four quadrants.  · Musculoskeletal  o General  o : Normal muscle tone and strength.  · Skin and Subcutaneous Tissue  o General Inspection  o : No skin rashes.  · Extremities  o Extremities  o : Warm and well perfused. Distal pulses present. No pitting pedal edema.     Echocardiogram done at [facility] on 10/17/2020 showed mild global hypokinesis of the left ventricle with an ejection fraction of 40-45%.  No valvular problems.    EKG done " on 10/19/2020 showed normal sinus rhythm and normal axis.    Labs done on 08/05/2020 show sodium 141, potassium 4.6, chloride 102, BUN 11, creatinine 0.96.  AST 51, ALT 70.  , , LDL 95, HDL 52.  TSH is 2.01.  Hemoglobin is 16.3.           Assessment     ASSESSMENT AND PLAN:    1.  Cardiomyopathy:  Recent echocardiogram showed left ventricular ejection fraction of 40-45% with mild       global hypokinesis.  Patient currently is asymptomatic and has no heart-failure symptoms or signs.  It is        unclear whether the low ejection fraction is due to acute illness or an underlying cardiomyopathic process.       At this time will repeat the echocardiogram to reassess the left ventricular function, and if ejection fraction        is still on the lower side, will start treatment with beta blockers and ACE inhibitors.  Ischemia needs to be        ruled out at that point as well.  2.  Follow up with an echocardiogram, to be done after mid-November.    MD PAUL Otero/janes           This note was transcribed by Suzanne Minaya.  janes/PAUL  The above service was transcribed by Suzanne Minaya, and I attest to the accuracy of the note.  ELIAV               Electronically Signed by: Suzanne Minaya-, -Author on November 13, 2020 11:08:02 AM  Electronically Co-signed by: Thomas Block MD -Reviewer on November 13, 2020 03:36:03 PM

## 2021-05-14 VITALS
HEART RATE: 113 BPM | BODY MASS INDEX: 35.79 KG/M2 | DIASTOLIC BLOOD PRESSURE: 92 MMHG | WEIGHT: 250 LBS | SYSTOLIC BLOOD PRESSURE: 132 MMHG | HEIGHT: 70 IN

## 2021-05-18 NOTE — PROGRESS NOTES
Pranav CalderonJamee 1975     Office/Outpatient Visit    Visit Date: Mon, Oct 22, 2018 10:02 am    Provider: Wanda Bautista N.P. (Assistant: Sarah Spurling, MA)    Location: [facility]        Electronically signed by Wanda Bautista N.P. on  10/22/2018 10:46:24 AM                             SUBJECTIVE:        CC:     Mr. Calderon is a 43 year old White male.  Pt thinks that he has acid reflux, and his stomach has been burning.          HPI:         Patient complains of abdominal pain, other specified site.  This is located primarily in the left upper quadrant and epigastric region.  It does not radiate.  It began 2 years ago.  The onset of pain occurred after eating and in morning.  He characterizes it as aching and burning.  It is of severe intensity.  This is the first episode of this type of pain.  The typical duration is Other.  Nothing relieves the pain.  Associated symptoms include belching, occasional bloating, early satiety.  He denies melena, change in bowel habits, constipation, diarrhea, fever, nausea, red bloody stools or vomiting.  has had back surgery in past, was taking NSAIDs when this began, but nothing recently     ROS:     CONSTITUTIONAL:  Negative for chills, fatigue and fever.      CARDIOVASCULAR:  Negative for chest pain and pedal edema.      RESPIRATORY:  Negative for recent cough and dyspnea.      GASTROINTESTINAL:  Positive for abdominal pain ( epigastric; LUQ ), anorexia, abdominal bloating, heartburn and belching, early satiety.   Negative for constipation, diarrhea, hematochezia, melena, nausea or vomiting.      MUSCULOSKELETAL:  Positive for back pain ( chronic ).   Negative for arthralgias or myalgias.          PMH/FMH/SH:     Last Reviewed on 10/22/2018 10:46 AM by Wanda Bautista    Past Medical History:     UNREMARKABLE         PREVENTIVE HEALTH MAINTENANCE             INFLUENZA VACCINE: was last done fall 2016         Surgical History:         back surgery - 2  rods and screws between L4 and S1- 2016;         Family History:     Father: COPD;  Type 2 Diabetes     Mother: Hypertension;  Skin Cancer     Brother(s): Healthy; 1 brother(s) total     Son(s): 1 son(s) total;  allergy to peanuts     Paternal Grandfather:      Paternal Grandmother:      Maternal Grandfather:      Maternal Grandmother:          Social History:     Occupation: [employer]     Marital Status:      Children: 1 child         Tobacco/Alcohol/Supplements:     Last Reviewed on 10/22/2018 10:02 AM by Spurling, Sarah C    Tobacco: He has never smoked.          Alcohol: Frequency:    rarely;         Substance Abuse History:     Last Reviewed on 3/16/2018 10:56 AM by Pardeep Dey         Mental Health History:     Last Reviewed on 3/16/2018 10:56 AM by Pardeep Dey        Communicable Diseases (eg STDs):     Last Reviewed on 3/16/2018 10:56 AM by Pardeep Dey            Current Problems:     Last Reviewed on 10/22/2018 10:46 AM by Wanda Bautista     Obstructive sleep apnea (adult) (pediatric)     Abdominal pain, other specified site         Immunizations:     Influenza Virus Vaccine pf (adult) 2016     Influenza Virus Vaccine pf (adult) 10/25/2017         Allergies:     Last Reviewed on 10/22/2018 10:02 AM by Spurling, Sarah C      No Known Drug Allergies.         Current Medications:     Last Reviewed on 10/22/2018 10:06 AM by Spurling, Sarah C    None        OBJECTIVE:        Vitals:         Current: 10/22/2018 10:07:40 AM    Ht:  5 ft, 11.5 in;  Wt: 249.4 lbs;  BMI: 34.3    T: 97.8 F (oral);  BP: 117/80 mm Hg (left arm, sitting);  P: 79 bpm (left arm (BP Cuff), sitting);  sCr: 0.86 mg/dL;  GFR: 127.06        Exams:     PHYSICAL EXAM:     GENERAL: Vitals recorded well developed, well nourished, obese;  no apparent distress;     NECK: range of motion is normal;     RESPIRATORY: normal appearance and  symmetric expansion of chest wall; normal respiratory rate and pattern with no distress; normal breath sounds with no rales, rhonchi, wheezes or rubs;     CARDIOVASCULAR: normal rate; rhythm is regular;  no edema;     GASTROINTESTINAL: mild LUQ tenderness; moderate epigastric pain;  normal bowel sounds; no organomegaly;     MUSCULOSKELETAL: normal gait; normal range of motion of all major muscle groups; no limb or joint pain with range of motion;     NEUROLOGIC: mental status: alert and oriented x 3;     PSYCHIATRIC: appropriate affect and demeanor; normal speech pattern; normal thought and perception;         ASSESSMENT           789.09   R10.13   R10.12  Abdominal pain, other specified site              DDx:         ORDERS:         Meds Prescribed:       Omeprazole 20mg Capsules, Extended Release Take 1 capsule(s) by mouth bid x 14 days  #28 (Twenty Eight) capsule(s) Refills: 0         Lab Orders:       [id number]  [facility] - [facility] H.pylori Breath test  (Send-Out)                   PLAN:          Abdominal pain, other specified site suspect Hpylori, discussed possibility of needed GI referral , will start on omeprazole, instructed to avoid NSAIDS  Tylenol only - labs earlier this month were normal  (no GB indication)     LABORATORY:  Labs ordered to be performed today include H. pylori breath test.            Prescriptions:       Omeprazole 20mg Capsules, Extended Release Take 1 capsule(s) by mouth bid x 14 days  #28 (Twenty Eight) capsule(s) Refills: 0           Orders:       [id number]  [facility] - [facility] H.pylori Breath test  (Send-Out)             Patient Education Handouts:       Abdominal Pain              CHARGE CAPTURE           **Please note: ICD descriptions below are intended for billing purposes only and may not represent clinical diagnoses**        Primary Diagnosis:         789.09 Abdominal pain, other specified site            R10.13    Epigastric pain           R10.12    Left upper quadrant pain              Orders:           [id number]   Office/outpatient visit; established patient, level 3  (In-House)

## 2021-05-18 NOTE — PROGRESS NOTES
Pranav Calderon  1975     Office/Outpatient Visit    Visit Date: u, Oct 29, 2020 12:49 pm    Provider: Kristin Viramontes N.P. (Assistant: Sheridan Guevara MA)    Location: [facility]        Electronically signed by Kristin Viramontes N.P. on  11/03/2020 08:53:53 AM                             Subjective:        CC: (states he is not taking any meds)Mr. Calderon is a 45 year old White male.  He is here today following a transition of care from an inpatient hospital: [facility]. The patient was admitted on 10/15/2020. The patient was admitted for acute resp/COVID. Our office called the patient within 48 hours of discharge and scheduled the follow-up appointment. During the patient's hospital stay the patient was treated by Dr. Nichols. Medications have been reviewed and reconciled with discharge summary..          HPI: 44 y/o male presenting to office via telehealth video appt. This is for a 1 week f/u after hospital discharge. He had been dx with COVID and ijeoma pneumonia. He was treated with Parental abx and was successfully weaned off O2. While admitted, he had been up to a 50% Venturi mask. He is still c/o cough. He is using nebulizer and humidifier in room. His strength is slowly returning. He is trying to walk street, but he fatigues easily. Medical, surgical, family and social hx reviewed.    ROS:     CONSTITUTIONAL:  Positive for fatigue.   Negative for chills, fever or night sweats.      EYES:  Negative for blurred vision.      E/N/T:  Negative for ear pain, diminished hearing and nasal congestion.      CARDIOVASCULAR:  Negative for chest pain, dizziness, palpitations and pedal edema.      RESPIRATORY:  Positive for dyspnea ( with moderate exertion ).   Negative for pleuritic chest pain or frequent wheezing.      GASTROINTESTINAL:  Negative for abdominal pain, diarrhea, nausea and vomiting.      GENITOURINARY:  Negative for dysuria.      MUSCULOSKELETAL:  Negative for arthralgias  and myalgias.      INTEGUMENTARY:  Negative for atypical mole(s) and rash.      NEUROLOGICAL:  Negative for headaches, paresthesias and weakness.      PSYCHIATRIC:  Negative for anxiety, depression, sleep disturbance and suicidal thoughts.          Past Medical History / Family History / Social History:         Last Reviewed on 10/29/2020 01:11 PM by Kristin Viramontes    Past Medical History:             PAST MEDICAL HISTORY     Hospitalizations:    Pneumonia 10-  pneumonia due to covid 19         PREVENTIVE HEALTH MAINTENANCE             INFLUENZA VACCINE: was last done 2016         Surgical History:         back surgery - 2 rods and screws between L4 and S1- ;         Family History:     Father: COPD;  Type 2 Diabetes     Mother: Hypertension;  Skin Cancer     Brother(s): Healthy; 1 brother(s) total     Son(s): 1 son(s) total;  allergy to peanuts     Paternal Grandfather:      Paternal Grandmother:      Maternal Grandfather:      Maternal Grandmother:          Social History:     Occupation: [employer]     Marital Status:      Children: 1 child         Tobacco/Alcohol/Supplements:     Last Reviewed on 10/29/2020 01:11 PM by Kristin Viramontes    Tobacco: He has never smoked.          Alcohol: Frequency:    rarely;         Substance Abuse History:     Last Reviewed on 10/29/2020 01:11 PM by Kristin Viramontes    None         Mental Health History:     Last Reviewed on 3/16/2018 10:56 AM by Pardeep Dey        Communicable Diseases (eg STDs):     Last Reviewed on 3/16/2018 10:56 AM by Pardeep Dey        Immunizations:     Influenza Virus Vaccine pf (adult) 2016    Influenza Virus Vaccine pf (adult) 10/25/2017        Allergies:     Last Reviewed on 10/29/2020 01:11 PM by Kristin Viramontes    No Known Allergies.        Current Medications:     Last Reviewed on 10/29/2020 01:11 PM by Kristin Viramontes    meclizine 25 mg oral  tablet [take 1 tablet (25 mg) by oral route every 6 hours as needed for dizziness]    cetirizine 10 mg oral tablet [take 1 tablet (10 mg) by oral route once daily]        Objective:        Exams:     PHYSICAL EXAM:     GENERAL: well developed, well nourished;  well groomed;  no apparent distress, tired-appearing;     RESPIRATORY: normal respiratory rate and pattern with no distress;     NEUROLOGIC: mental status: alert and oriented x 3;     PSYCHIATRIC:  appropriate affect and demeanor; normal speech pattern; grossly normal memory;         Assessment:         U07.1   COVID-19       R05   Cough       J12.81   Pneumonia due to SARS-associated coronavirus           ORDERS:         Meds Prescribed:       [New Rx] brompheniramine-pseudoeph-DM 2-30-10 mg/5 mL oral Syrup [take 10 milliliters by oral route every 4 hours as needed for cough], #118 (one hundred and eighteen) milliliters, Refills: 0 (zero)                 Plan:         COVID-19Pt to f/u as needed. Go to ED with any soa. Pt is out of quarantine. Continue to exercise as tolerated to help regain strength. Continue to use humidifier. Cough medication as needed.   Pt is to notify office with any acute concerns or issues. Pt v/u and had no further questions upon d/c.         Coughsee above          Prescriptions:       [New Rx] brompheniramine-pseudoeph-DM 2-30-10 mg/5 mL oral Syrup [take 10 milliliters by oral route every 4 hours as needed for cough], #118 (one hundred and eighteen) milliliters, Refills: 0 (zero)         Pneumonia due to SARS-associated coronavirussee above.     Telehealth: Verbal consent obtained for visit to occur via [employer] conferencing; Staff, other than provider, present during telephone visit include Sheridan Guevara Ma             Charge Capture:         Primary Diagnosis:     U07.1  COVID-19           Orders:      [id number]  Office/outpatient visit; established patient, level 4  (In-House)              R05  Cough     J12.81  Pneumonia due to  SARS-associated coronavirus         ADDENDUMS:      ____________________________________    Addendum: 11/18/2020 06:19 PM - Kristin Viramontes  [id number]        Add  65926 (TCM of Moderate Complexity MDM within 14 days of discharge) -          add: discharge date 10/22/2020 to hpi

## 2021-05-18 NOTE — PROGRESS NOTES
Pranav Calderon 1975     Office/Outpatient Visit    Visit Date: Fri, Mar 16, 2018 10:20 am    Provider: Pardeep Dey MD (Assistant: Ronit Pacheco MA)    Location: [facility] [place]        Electronically signed by Pardeep Dey MD on  2018 03:10:43 PM                             SUBJECTIVE:        CC: follow up on sleep apnea         HPI:     Renaldo is in today for follow up on sleep apnea.  He was diagnosed with this some time ago.  However, he did not have treatment for this due to some other issues that arose.  He is not able to sleep on his back.  He finds that he will wake up gasping for air.  He never feels rested.  He has also noted weight gain of about 20 pounds in the last year.  He did have blood work done late last year that was mostly normal.      ROS:     CONSTITUTIONAL:  Negative for chills and fever.      CARDIOVASCULAR:  Negative for chest pain and palpitations.      RESPIRATORY:  Negative for recent cough and dyspnea.      GASTROINTESTINAL:  Negative for abdominal pain, nausea and vomiting.      INTEGUMENTARY:  Negative for atypical mole(s) and rash.          PM/FM/SH:     Last Reviewed on 3/16/2018 10:56 AM by Pardeep Dey    Past Medical History:     UNREMARKABLE         PREVENTIVE HEALTH MAINTENANCE             INFLUENZA VACCINE: was last done 2016         Surgical History:         back surgery - 2 rods and screws between L4 and S1- ;         Family History:     Father: COPD;  Type 2 Diabetes     Mother: Hypertension;  Skin Cancer     Brother(s): Healthy; 1 brother(s) total     Son(s): 1 son(s) total;  allergy to peanuts     Paternal Grandfather:      Paternal Grandmother:      Maternal Grandfather:      Maternal Grandmother:          Social History:     Occupation: [employer]     Marital Status:      Children: 1 child         Tobacco/Alcohol/Supplements:     Last Reviewed on 3/16/2018 10:56 AM  by Pardeep Dey    Tobacco: He has never smoked.          Alcohol: Frequency:    rarely;         Substance Abuse History:     Last Reviewed on 3/16/2018 10:56 AM by Pardeep Dey         Mental Health History:     Last Reviewed on 3/16/2018 10:56 AM by Pardeep Dey        Communicable Diseases (eg STDs):     Last Reviewed on 3/16/2018 10:56 AM by Pardeep Dey            Current Problems:     Last Reviewed on 3/16/2018 10:56 AM by Pardeep Dey    Obstructive sleep apnea (adult) (pediatric)         Immunizations:     Influenza Virus Vaccine pf (adult) 11/9/2016     Influenza Virus Vaccine pf (adult) 10/25/2017         Allergies:     Last Reviewed on 3/16/2018 10:56 AM by Pardeep Dey      No Known Drug Allergies.         Current Medications:     Last Reviewed on 3/16/2018 10:56 AM by Pardeep Dey        OBJECTIVE:        Vitals:         Current: 3/16/2018 10:28:12 AM    Ht:  5 ft, 11.5 in;  Wt: 260 lbs;  BMI: 35.8    T: 97.7 F (oral);  BP: 126/68 mm Hg (left arm, sitting);  P: 95 bpm (finger clip, sitting);  sCr: 0.87 mg/dL;  GFR: 129.10        Exams:     PHYSICAL EXAM:     GENERAL: Vitals recorded well developed,  moderately obese;     EYES: extraocular movements intact; conjunctiva and cornea are normal; PERRL;     E/N/T: EARS:  normal external auditory canals and tympanic membranes;  grossly normal hearing; OROPHARYNX:  normal mucosa, dentition, gingiva, and posterior pharynx;     NECK: range of motion is normal; thyroid is non-palpable;     RESPIRATORY: normal respiratory rate and pattern with no distress; normal breath sounds with no rales, rhonchi, wheezes or rubs;     CARDIOVASCULAR: normal rate; rhythm is regular;  no systolic murmur;     GASTROINTESTINAL: nontender; normal bowel sounds; no masses;     SKIN:  no significant rashes or lesions; no suspicious moles;         ASSESSMENT           327.23   G47.33  Obstructive sleep  apnea (adult) (pediatric)              DDx:         ORDERS:         Procedures Ordered:       REFER  Referral to Specialist or Other Facility  (Send-Out)           Other Orders:         Calculated BMI above the upper parameter and a follow-up plan was documented in the medical record  (In-House)                   PLAN:          Obstructive sleep apnea (adult) (pediatric)         REFERRALS:  Referral initiated to an E/N/T ( Dr. De La Torre ) and [facility].      RECOMMENDATIONS given include: My suspicion is that Renaldo has very significant sleep apnea.  We are going to refer him to evaluation by the sleep doctor locally and also to Dr. De La Torre for consideration of [facility].  It sounds like he has had difficulty with tolerating normal treatments.  Beyond this, we briefly discussed some dietary changes that he could consider to help with the weight issue..  MIPS     BMI Elevated - Follow-Up Plan: He was provided education on weight loss strategies           Orders:       REFER  Referral to Specialist or Other Facility  (Send-Out)           Calculated BMI above the upper parameter and a follow-up plan was documented in the medical record  (In-House)             Patient Education Handouts:       Sleep Apnea              CHARGE CAPTURE           **Please note: ICD descriptions below are intended for billing purposes only and may not represent clinical diagnoses**        Primary Diagnosis:         327.23 Obstructive sleep apnea (adult) (pediatric)            G47.33    Obstructive sleep apnea (adult) (pediatric)              Orders:          [id number]   Office/outpatient visit; established patient, level 3  (In-House)                Calculated BMI above the upper parameter and a follow-up plan was documented in the medical record  (In-House)               ADDENDUMS:      ____________________________________    Addendum: 03/19/2018 11:32 AM - Edita Raymond         Visit Note Faxed to:        [facility]   (Pulmonary Diseases); Number [phone]     Health Summary Faxed to:        [facility]  (Pulmonary Diseases); Number [phone]            Addendum: 03/19/2018 11:33 AM - Edita Raymond         Visit Note Faxed to:        [facility]  (Otol[facility]ology); Number [phone]     Health Summary Faxed to:        [facility]  ([facility]ology); Number [phone]

## 2021-07-01 VITALS
BODY MASS INDEX: 33.78 KG/M2 | SYSTOLIC BLOOD PRESSURE: 117 MMHG | TEMPERATURE: 97.8 F | HEART RATE: 79 BPM | WEIGHT: 249.4 LBS | HEIGHT: 72 IN | DIASTOLIC BLOOD PRESSURE: 80 MMHG

## 2021-07-01 VITALS
SYSTOLIC BLOOD PRESSURE: 126 MMHG | TEMPERATURE: 97.7 F | WEIGHT: 260 LBS | HEIGHT: 72 IN | BODY MASS INDEX: 35.21 KG/M2 | HEART RATE: 95 BPM | DIASTOLIC BLOOD PRESSURE: 68 MMHG

## 2021-07-02 VITALS — TEMPERATURE: 98.3 F | HEIGHT: 72 IN | BODY MASS INDEX: 37.54 KG/M2

## 2021-07-02 VITALS
HEART RATE: 95 BPM | WEIGHT: 273 LBS | HEIGHT: 72 IN | BODY MASS INDEX: 36.98 KG/M2 | DIASTOLIC BLOOD PRESSURE: 91 MMHG | TEMPERATURE: 97.8 F | SYSTOLIC BLOOD PRESSURE: 129 MMHG

## 2022-04-26 ENCOUNTER — OFFICE VISIT (OUTPATIENT)
Dept: FAMILY MEDICINE CLINIC | Age: 47
End: 2022-04-26

## 2022-04-26 VITALS
DIASTOLIC BLOOD PRESSURE: 76 MMHG | OXYGEN SATURATION: 98 % | SYSTOLIC BLOOD PRESSURE: 122 MMHG | WEIGHT: 257.8 LBS | HEIGHT: 69 IN | HEART RATE: 96 BPM | BODY MASS INDEX: 38.18 KG/M2

## 2022-04-26 DIAGNOSIS — F34.1 DYSTHYMIA: Primary | ICD-10-CM

## 2022-04-26 DIAGNOSIS — R74.8 ELEVATED LIVER ENZYMES: ICD-10-CM

## 2022-04-26 PROBLEM — M72.2 PLANTAR FASCIITIS: Status: ACTIVE | Noted: 2022-04-26

## 2022-04-26 PROBLEM — M54.50 LOW BACK PAIN: Status: ACTIVE | Noted: 2022-04-26

## 2022-04-26 PROBLEM — M79.672 PAIN IN LEFT FOOT: Status: ACTIVE | Noted: 2022-04-26

## 2022-04-26 PROBLEM — L85.9 HYPERKERATOSIS: Status: ACTIVE | Noted: 2022-04-26

## 2022-04-26 PROBLEM — Q66.72 CAVUS DEFORMITY OF LEFT FOOT: Status: ACTIVE | Noted: 2022-04-26

## 2022-04-26 PROBLEM — M20.42 ACQUIRED HAMMER TOE OF LEFT FOOT: Status: ACTIVE | Noted: 2022-04-26

## 2022-04-26 PROBLEM — E55.9 VITAMIN D DEFICIENCY: Status: ACTIVE | Noted: 2022-04-26

## 2022-04-26 PROCEDURE — 99213 OFFICE O/P EST LOW 20 MIN: CPT | Performed by: NURSE PRACTITIONER

## 2022-04-26 RX ORDER — FLUOXETINE HYDROCHLORIDE 20 MG/1
20 CAPSULE ORAL DAILY
Qty: 90 CAPSULE | Refills: 0 | Status: SHIPPED | OUTPATIENT
Start: 2022-04-26 | End: 2022-07-26 | Stop reason: SDUPTHER

## 2022-04-26 NOTE — PROGRESS NOTES
Chief Complaint  Anxiety and Other (Pt requesting labs. Pt was seen at [facility] ER 3/22/22 and was told that liver enzymes were elevated. )    Subjective    Patient is a 46-year-old male in today with complaints of increased anxiety.  Patient reports he was treated many years ago with generic version of Prozac, which she reports worked well for him.  Patient reports he has little interest or pleasure in doing things, and he describes himself as an introvert and has social anxiety.  Patient was seen at [facility] ER on March 22 for left-sided chest pain, where they ruled out cardiac causes, and he believes it was due to anxiety.  Patient reports they told him his liver enzymes were elevated and that he should follow-up with his primary care provider.  Patient denies fever, chest pain, or shortness of breath at this time.  Denies suicidal ideations or plan to harm himself at this time.    Past Medical History:   Diagnosis Date   • Anxiety    • Back pain    • Depression    • Lumbar herniated disc    • Lumbosacral disc disease    • Obesity    • Obstructive sleep apnea     C-PAP mask not working/ not currently treating   • Right lumbar radiculopathy    • Work related injury 10/30/2014    reinjured 8-13-15          {Problem List  Visit Diagnosis   Encounters  Notes  Medications  Labs  Result Review Imaging  Media :23}     Pranav JOSH Kvng presents to [facility]  Anxiety  Presents for initial visit. Onset was more than 5 years ago. Symptoms include depressed mood, dizziness, excessive worry, insomnia and nervous/anxious behavior. Patient reports no suicidal ideas. Symptoms occur constantly. The severity of symptoms is causing significant distress. Nothing aggravates the symptoms. The quality of sleep is poor. Nighttime awakenings: occasional.     Risk factors include family history. His past medical history is significant for anxiety/panic attacks and depression. Past treatments include  "SSRIs. The treatment provided no relief. Compliance with prior treatments has been good.       Objective   Vital Signs:   /76 (BP Location: Left arm, Patient Position: Sitting, Cuff Size: Large Adult)   Pulse 96   Ht 175.3 cm (69\")   Wt 117 kg (257 lb 12.8 oz)   SpO2 98%   BMI 38.07 kg/m²     Physical Exam  HENT:      Head: Normocephalic.   Cardiovascular:      Rate and Rhythm: Normal rate and regular rhythm.   Pulmonary:      Effort: Pulmonary effort is normal. No respiratory distress.      Breath sounds: Normal breath sounds. No stridor. No wheezing, rhonchi or rales.   Skin:     General: Skin is warm and dry.   Neurological:      Mental Status: He is alert and oriented to person, place, and time.   Psychiatric:         Attention and Perception: Attention normal.         Mood and Affect: Mood normal.         Speech: Speech normal.         Behavior: Behavior normal. Behavior is cooperative.        Result Review :                 Assessment and Plan    Diagnoses and all orders for this visit:    1. Dysthymia (Primary)  Assessment & Plan:  Patient's depression is recurrent and is moderate without psychosis. Their depression is currently active and the condition is worsening. This will be reassessed in 3 months. F/U as described:patient was prescribed an antidepressant medicine.    Orders:  -     FLUoxetine (PROzac) 20 MG capsule; Take 1 capsule by mouth Daily.  Dispense: 90 capsule; Refill: 0    2. Elevated liver enzymes  Comments:  Will obtain labs from [facility], recommend low saturated fat diet             Follow Up   Return in about 3 months (around 7/26/2022) for Next scheduled follow up with PCP.  Patient was given instructions and counseling regarding his condition or for health maintenance advice. Please see specific information pulled into the AVS if appropriate.       "

## 2022-04-26 NOTE — ASSESSMENT & PLAN NOTE
Patient's depression is recurrent and is moderate without psychosis. Their depression is currently active and the condition is worsening. This will be reassessed in 3 months. F/U as described:patient was prescribed an antidepressant medicine.

## 2022-07-26 ENCOUNTER — OFFICE VISIT (OUTPATIENT)
Dept: FAMILY MEDICINE CLINIC | Age: 47
End: 2022-07-26

## 2022-07-26 VITALS
BODY MASS INDEX: 37.89 KG/M2 | HEART RATE: 109 BPM | DIASTOLIC BLOOD PRESSURE: 83 MMHG | OXYGEN SATURATION: 95 % | WEIGHT: 255.8 LBS | HEIGHT: 69 IN | SYSTOLIC BLOOD PRESSURE: 126 MMHG

## 2022-07-26 DIAGNOSIS — R00.0 TACHYCARDIA: ICD-10-CM

## 2022-07-26 DIAGNOSIS — F34.1 DYSTHYMIA: ICD-10-CM

## 2022-07-26 DIAGNOSIS — G47.33 OBSTRUCTIVE SLEEP APNEA SYNDROME: ICD-10-CM

## 2022-07-26 DIAGNOSIS — E66.3 OVERWEIGHT: ICD-10-CM

## 2022-07-26 DIAGNOSIS — F41.9 ANXIETY: ICD-10-CM

## 2022-07-26 DIAGNOSIS — F32.4 MAJOR DEPRESSIVE DISORDER WITH SINGLE EPISODE, IN PARTIAL REMISSION: Primary | ICD-10-CM

## 2022-07-26 PROCEDURE — 99214 OFFICE O/P EST MOD 30 MIN: CPT | Performed by: FAMILY MEDICINE

## 2022-07-26 RX ORDER — FLUOXETINE HYDROCHLORIDE 20 MG/1
20 CAPSULE ORAL DAILY
Qty: 90 CAPSULE | Refills: 0 | Status: SHIPPED | OUTPATIENT
Start: 2022-07-26 | End: 2022-08-09 | Stop reason: ALTCHOICE

## 2022-07-26 RX ORDER — BUSPIRONE HYDROCHLORIDE 10 MG/1
10 TABLET ORAL 2 TIMES DAILY PRN
Qty: 60 TABLET | Refills: 1 | Status: SHIPPED | OUTPATIENT
Start: 2022-07-26 | End: 2022-08-09 | Stop reason: SINTOL

## 2022-07-26 RX ORDER — HYDROXYZINE HYDROCHLORIDE 25 MG/1
25 TABLET, FILM COATED ORAL 3 TIMES DAILY PRN
Qty: 30 TABLET | Refills: 0 | Status: SHIPPED | OUTPATIENT
Start: 2022-07-26 | End: 2022-08-09 | Stop reason: DRUGHIGH

## 2022-07-26 NOTE — PROGRESS NOTES
Pranav Calderon presents to [facility].    Chief Complaint: depression and anxiety    Subjective       History of Present Illness:  TOLU Macario is in today f/u for his depression and anxiety, he was worked up for his chest pain and his ekg and stress test were good and he consulted with cardiologist and he was released. Symptoms onset at end of March.  Patient's depression is recurrent and is moderate without psychosis. He has no suicidal thoughts.  Verna Mohr startd him on prozac 20 mg daily and he has seen no benefits with med, still anxious and feels worse, seems to be worse with driving or out in social settings including work.  Really onset with a bad incident at work and now it seems to affect him constantly.   He sometimes has anger issues, but always outside of work. He is sleeping ok, waking up is more of an issue-he wakes up exhausted, has sleep apnea and doesn't tolerate the cpap or bipap due to claustaphobia.  His home life is good, he stresses over his son who has anxiety, and he is  from his 1st wife and remarried.  He works with cylinders at work.         Mr. Calderon is a 46-year-old white male presenting recently to the emergency department at [facility] with atypical chest pain ruling out for acute coronary syndrome with serial troponins, EKG, and chest x-ray. Patient continues to have some atypical chest discomfort as noted primarily at rest nonexertional, largely sedentary day-to-day activity, works as a  and warehouse    ECHO CONCLUSION:  1. Normal left ventricular systolic function with estimated LV ejection fraction of 55%.   2. Grade 1 diastolic dysfunction of the left ventricle.   3. No hemodynamically significant valvular abnormalities.          Review of Systems:  Review of Systems   Constitutional: Negative for chills, fatigue and fever.   HENT: Negative for congestion, ear discharge and sore throat.    Respiratory: Negative for  shortness of breath.    Cardiovascular: Negative for chest pain.   Gastrointestinal: Negative for abdominal pain, constipation, diarrhea, nausea, vomiting and GERD.   Genitourinary: Negative for flank pain.   Neurological: Negative for dizziness and headache.   Psychiatric/Behavioral: Negative for depressed mood.        Objective   Medical History:  Past Medical History:   • Anxiety   • Back pain   • Depression   • Lumbar herniated disc   • Lumbosacral disc disease   • Obesity   • Obstructive sleep apnea    C-PAP mask not working/ not currently treating   • Right lumbar radiculopathy   • Work related injury    reinjured 8-13-15     Past Surgical History:   • LUMBAR DECOMPRESSION    JE  L4-5 lumbar decomp disckectomy and TLIF  sextant mtrix and stealth   • LUMBAR FUSION    Procedure: RT L4-5 LUMBAR DECOMPRESSION DISCECTOMY AND TLIF SEXTANT METRIX AND STEALTH;  Surgeon: Celestino Mackay MD;  Location: [facility] MAIN OR;  Service:    • PLANTAR FASCIA RELEASE    Procedure: LEFT FOOT ENDOSCOPIC PLANTAR FASCIOTOMY;  Surgeon: Rafy Bauer DPM;  [place] [facility] [place];  Service: Podiatry;  Laterality: Left;   • SPINE SURGERY    lumbar spinal diskectomy L4/L5  L5/S1 2009   • WISDOM TOOTH EXTRACTION      Family History   Problem Relation Age of Onset   • Diabetes Father      Social History     Tobacco Use   • Smoking status: Never Smoker   • Smokeless tobacco: Never Used   Substance Use Topics   • Alcohol use: No       Health Maintenance Due   Topic Date Due   • COLORECTAL CANCER SCREENING  Never done   • ANNUAL PHYSICAL  Never done   • TDAP/TD VACCINES (1 - Tdap) Never done   • COVID-19 Vaccine (3 - Booster for Pfizer series) 09/28/2021   • HEPATITIS C SCREENING  Never done        Immunization History   Administered Date(s) Administered   • COVID-19 (PFIZER) PURPLE CAP 04/02/2021, 04/28/2021   • Flu Vaccine Intradermal Quad 18-64YR 10/19/2021       No Known Allergies     Medications:  Current Outpatient Medications on  "File Prior to Visit   Medication Sig   • [DISCONTINUED] FLUoxetine (PROzac) 20 MG capsule Take 1 capsule by mouth Daily.     No current facility-administered medications on file prior to visit.       Vital Signs:   /83   Pulse 109   Ht 175.3 cm (69\")   Wt 116 kg (255 lb 12.8 oz)   SpO2 95%   BMI 37.78 kg/m²       Physical Exam:  Physical Exam  Vitals and nursing note reviewed.   Constitutional:       General: He is not in acute distress.     Appearance: Normal appearance. He is not ill-appearing, toxic-appearing or diaphoretic.   HENT:      Head: Normocephalic and atraumatic.      Nose: No congestion or rhinorrhea.      Mouth/Throat:      Mouth: Mucous membranes are moist.      Pharynx: Oropharynx is clear. No oropharyngeal exudate or posterior oropharyngeal erythema.   Eyes:      Extraocular Movements: Extraocular movements intact.      Conjunctiva/sclera: Conjunctivae normal.      Pupils: Pupils are equal, round, and reactive to light.   Cardiovascular:      Rate and Rhythm: Normal rate and regular rhythm.      Heart sounds: Normal heart sounds.   Pulmonary:      Effort: Pulmonary effort is normal.      Breath sounds: Normal breath sounds. No wheezing, rhonchi or rales.   Abdominal:      General: Abdomen is flat.      Palpations: Abdomen is soft.   Musculoskeletal:      Cervical back: Neck supple. No rigidity.   Lymphadenopathy:      Cervical: No cervical adenopathy.   Skin:     General: Skin is warm and dry.   Neurological:      Mental Status: He is alert and oriented to person, place, and time.   Psychiatric:         Mood and Affect: Mood normal.         Behavior: Behavior normal.         Result Review      The following data was reviewed by Fauzia Ballard MD on 07/26/2022.  Lab Results   Component Value Date    WBC 5.98 08/05/2020    HGB 16.30 08/05/2020    HCT 48.7 08/05/2020    MCV 93.3 08/05/2020    .00 08/05/2020     Lab Results   Component Value Date    GLUCOSE 91 08/05/2020    BUN " 11 08/05/2020    CREATININE 0.96 08/05/2020    BCR 11 08/05/2020    K 4.6 08/05/2020    CO2 22 08/05/2020    CALCIUM 10.1 08/05/2020    ALBUMIN 4.2 08/05/2020    LABIL2 1.4 08/05/2020    AST 51 (H) 08/05/2020    ALT 70 (H) 08/05/2020     Lab Results   Component Value Date    CHLPL 172 08/05/2020    TRIG 125 08/05/2020    HDL 52 08/05/2020    LDL 95 08/05/2020     Lab Results   Component Value Date    TSH 2.010 08/05/2020     No results found for: HGBA1C  No results found for: PSA                    Assessment and Plan:          Diagnoses and all orders for this visit:    1. Major depressive disorder with single episode, in partial remission (HCC) (Primary)  Comments:  Stable but ongoing.  He really needs referral to psychiatry for further eval.  I will add BuSpar for the anxiety and hydroxyzine for when he gets severely anxio  Orders:  -     Ambulatory Referral to Psychiatry    2. Anxiety  Comments:   I will add BuSpar for the anxiety and hydroxyzine for when he gets severely anxious  Orders:  -     Ambulatory Referral to Psychiatry  -     busPIRone (BUSPAR) 10 MG tablet; Take 1 tablet by mouth 2 (Two) Times a Day As Needed (anxiety).  Dispense: 60 tablet; Refill: 1  -     hydrOXYzine (ATARAX) 25 MG tablet; Take 1 tablet by mouth 3 (Three) Times a Day As Needed for Anxiety.  Dispense: 30 tablet; Refill: 0    3. Dysthymia  -     FLUoxetine (PROzac) 20 MG capsule; Take 1 capsule by mouth Daily.  Dispense: 90 capsule; Refill: 0    4. Tachycardia  Comments:  normal EKG and echo-reviewed with pt today, reviewed cardiology visit with him today, unable to get stress test results    5. Obstructive sleep apnea syndrome  Comments:  Recommend we work on weight loss first and if unsuccessful he needs to get back into pulmonology about restarting CPAP    6. Overweight  Comments:  Referral to the nutrition specialist to help him with diet and weight loss.  Recommend daily exercise and I counseled him on low-carb diet  Orders:  -      Ambulatory Referral to Nutrition Services          Follow Up   Return in about 3 months (around 10/26/2022) for Recheck.

## 2022-08-08 ENCOUNTER — NUTRITION (OUTPATIENT)
Dept: NUTRITION | Facility: HOSPITAL | Age: 47
End: 2022-08-08

## 2022-08-08 PROCEDURE — 97802 MEDICAL NUTRITION INDIV IN: CPT | Performed by: DIETITIAN, REGISTERED

## 2022-08-09 ENCOUNTER — OFFICE VISIT (OUTPATIENT)
Dept: BEHAVIORAL HEALTH | Facility: CLINIC | Age: 47
End: 2022-08-09

## 2022-08-09 VITALS
DIASTOLIC BLOOD PRESSURE: 90 MMHG | BODY MASS INDEX: 37.29 KG/M2 | OXYGEN SATURATION: 95 % | HEIGHT: 69 IN | SYSTOLIC BLOOD PRESSURE: 140 MMHG | WEIGHT: 251.8 LBS | HEART RATE: 79 BPM

## 2022-08-09 DIAGNOSIS — F33.2 SEVERE EPISODE OF RECURRENT MAJOR DEPRESSIVE DISORDER, WITHOUT PSYCHOTIC FEATURES: ICD-10-CM

## 2022-08-09 DIAGNOSIS — F41.1 GENERALIZED ANXIETY DISORDER: Primary | ICD-10-CM

## 2022-08-09 PROCEDURE — 90792 PSYCH DIAG EVAL W/MED SRVCS: CPT | Performed by: NURSE PRACTITIONER

## 2022-08-09 RX ORDER — VENLAFAXINE HYDROCHLORIDE 37.5 MG/1
37.5 CAPSULE, EXTENDED RELEASE ORAL DAILY
Qty: 30 CAPSULE | Refills: 1 | Status: SHIPPED | OUTPATIENT
Start: 2022-08-09 | End: 2022-09-13 | Stop reason: SINTOL

## 2022-08-09 RX ORDER — HYDROXYZINE HYDROCHLORIDE 10 MG/1
10 TABLET, FILM COATED ORAL EVERY 6 HOURS PRN
Qty: 60 TABLET | Refills: 0 | Status: SHIPPED | OUTPATIENT
Start: 2022-08-09 | End: 2022-10-18

## 2022-08-09 NOTE — PATIENT INSTRUCTIONS
1.  Please return to clinic at your next scheduled visit.  Contact the clinic ([phone]) at least 24 hours prior in the event you need to cancel.  2.  Do no harm to yourself or others.    3.  Avoid alcohol and drugs.    4.  Take all medications as prescribed.  Please contact the clinic with any concerns. If you are in need of medication refills, please call the clinic at [phone].    5. Should you want to get in touch with your provider, CHINO Mcclain, please utilize [employer] message or contact the office ([phone]), and staff will be able to page the provider on call directly.  6.  In the event you have personal crisis, contact the following crisis numbers: Suicide Prevention Hotline 1-293.897.7207; [facility] Helpline 4-910-404-ZQTU; [facility] Emergency Room [phone]; text HELLO to 339365; or 660.  7.  Please feel free to contact my Medical Assistant, Meri, directly at [phone], please leave a voice mail if you do not get an answer, and she will return your call within 24 hrs.      SPECIFIC RECOMMENDATIONS:     1.      Medications discussed at this encounter:                   - Start Venlafaxine (Effexor) XR 37.5 mg by mouth daily in the morning     Stop PROZAC and BUSPAR    Change Hydroxyzine from 25 mg to 10 mg tab by mouth every 6 hrs as needed for anxiety. Sent in new order for 10 mg     2.      Psychotherapy recommendations: Referral order placed , Patient to contact provider and set up appointment.  And to contact office if unable to get an appointment scheduled.    Elyssa Curtis [facility]  Address: [address] [facility] [address], [place] [address], Phone:  [phone]  Services offered: Family, couples, and individual therapy for a wide variety of areas, such as mood disorders, personality disorders, psychosis, addiction, anxiety, coping skills, grief, trauma and PTSD, transgender, self-harming, suicidal ideation, and other. Multiple types of therapy offered,  including dialectical, trauma focused, and more.  Insurance accepted: Adele Mercado [employer] and [facility], [facility], [facility], Medicaid, [employer], Out of Network.      3.     Return to clinic: 5 weeks    Please arrive at least 15 minutes before your scheduled appointment time to complete check in process.

## 2022-08-09 NOTE — PROGRESS NOTES
"Subjective   Pranav Calderon is a 47 y.o. male who presents today for initial evaluation     Referring Provider:  Fauzia Ballard MD  [id number]  JANEL [address] [place] [address],  [place] [address]    Chief Complaint:  Depression and anxiety    History of Present Illness: Patient presents today in office with a history of anxiety and depression.   Patient began treatment approximately 10-12 yrs ago with PCP's, has never seen a behavioral health provider.  Patient initial demeanor presents as anxious, somewhat guarded, and flat, however, as visit progressed patient was very open with current stressors and feelings.      Depression: Patient complains of depression. He complains of anhedonia, depressed mood, difficulty concentrating, fatigue, feelings of worthlessness/guilt and negative thoughts of feeling family would be better off without him, though denies actual suicidal thoughts. Patient admits to \"if I was gone everyone would be better off, I would never do it, I just think about it, fleeting throughout different parts of the day.\"  Patient reports while working it is easier, however, endorses to increased anxiety related to work.  \"a lot of the issues stem from work as well.\"  Denies action plan.  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     Patient further describes details as \"the incident in March 2022, I was putting something away, forklift leaking I was unaware, switched vehicles, found leak and 2 days went by, and while working having some trouble with moving " "pallets, manager began yelling in my face\", patient began to have chest pain and coughing, and went to ED.  Patient was then given some acid reflex medications and had a cardiac workup which was negative.  Patient was then started on Prozac 20 mg 4/26/22.      Current manager is different than before, has had past problems with current female supervisor in 12/2020 while helping putting cylinders away, co-worker put in wrong bins but patient name was on the work, however, patient was punished and had told union, \" she had taken me out of the computer and she refused to put me back in the computer, had arguments prior to this incident,\" patient feels retaliation from this supervisor which has weighed on patient as supervisor now giving patient the \"cold shoulder\". And has difficulty with anxiety.  Patient reports past employer at [employer] did not have any problems, however, patient feels he does not share same  interests as fellow employees.  Patient has been at [employer] for 23 yrs, \"and I still feel like an outsider.\"     Patient admits to having feelings of chest pressure and minimal sweating to palms while driving, \"anytime I get an anxious or stressful situation almost,\" patient takes Hydroxyzine 25 mg and feels in zombie state, and 3-4 hrs later will take Buspar 10 mg which causes drowsiness, denies dizziness.  Patient believes anxiety symptoms while driving began after incident earlier this year with the [employer], March 2022.    Patient admits to only taking Buspar as needed, does not need or take while off work, and on weekends takes once if working as there is a \"lighter management\" .  Patient reports he is unable to take Prozac at exact same time with Buspar and hydroxyzine due to feelings of \"spacing out\".   Patient taking Prozac every morning which patient states, \"it makes me a little more social.  Not so much while driving to work, it's only 2 min.from my house. 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 \"High traffic area gets my chest going and I get anxious.\"          PHQ-9 Depression Screening  PHQ-9 Total Score: 23    Little interest or pleasure in doing things? 3-->nearly every day   Feeling down, depressed, or hopeless? 3-->nearly every day   Trouble falling or staying asleep, or sleeping too much? 1-->several days   Feeling tired or having little energy? 3-->nearly every day   Poor appetite or overeating? 3-->nearly every day   Feeling bad about yourself - or that you are a failure or have let yourself or your family down? 3-->nearly every day   Trouble concentrating on things, such as reading the newspaper or watching television? 2-->more than half the days   Moving or speaking so slowly that other people could have noticed? Or the opposite - being so fidgety or restless that you have been moving around a lot more than usual? 2-->more than half the days   Thoughts that you would be better off dead, or of hurting yourself in some way? 3-->nearly every day   PHQ-9 Total Score 23     BEE-7  Feeling nervous, anxious or on edge: Nearly every day  Not being able to stop or control worrying: Nearly every day  Worrying too much about different things: Nearly every day  Trouble Relaxing: More than half the days  Being so restless that it is hard to sit still: Not at all  Feeling afraid as if something awful might happen: More than half the days  Becoming easily annoyed or irritable: Nearly every day  BEE 7 Total Score: 16  If you checked any problems, how difficult have these problems made it for you to do your work, take care of things at home, or get along with other people: Somewhat difficult    Past Surgical History:  Past Surgical History:   Procedure Laterality Date   • LUMBAR DECOMPRESSION  03/09/2016    [facility]  L4-5 lumbar decomp disckectomy and TLIF  sextant mtrix and stealth   • LUMBAR FUSION Right 3/9/2016    " Procedure: RT L4-5 LUMBAR DECOMPRESSION DISCECTOMY AND TLIF SEXTANT METRIX AND TONYA;  Surgeon: Celestino Mackay MD;  Location: [facility] MAIN OR;  Service:    • PLANTAR FASCIA RELEASE Left 2/9/2021    Procedure: LEFT FOOT ENDOSCOPIC PLANTAR FASCIOTOMY;  Surgeon: Rafy Bauer DPM;  [place] [facility] [place];  Service: Podiatry;  Laterality: Left;   • SPINE SURGERY      lumbar spinal diskectomy L4/L5  L5/S1 2009   • WISDOM TOOTH EXTRACTION         Problem List:  Patient Active Problem List   Diagnosis   • Lumbosacral disc disease   • Work related injury   • Post-operative state   • Decreased sensation   • Low back pain   • Vitamin D deficiency   • Acquired hammer toe of left foot   • Cavus deformity of left foot   • Hyperkeratosis   • Pain in left foot   • Plantar fasciitis   • Dysthymia       Allergy:   No Known Allergies     Discontinued Medications:  Medications Discontinued During This Encounter   Medication Reason   • busPIRone (BUSPAR) 10 MG tablet Side effects   • FLUoxetine (PROzac) 20 MG capsule Alternate therapy   • hydrOXYzine (ATARAX) 25 MG tablet Dose adjustment       Current Medications:   Current Outpatient Medications   Medication Sig Dispense Refill   • hydrOXYzine (ATARAX) 10 MG tablet Take 1 tablet by mouth Every 6 (Six) Hours As Needed for Anxiety. Indications: Feeling Anxious 60 tablet 0   • venlafaxine XR (Effexor XR) 37.5 MG 24 hr capsule Take 1 capsule by mouth Daily for 60 days. Indications: Generalized Anxiety Disorder, Major Depressive Disorder 30 capsule 1     No current facility-administered medications for this visit.       Past Medical History:  Past Medical History:   Diagnosis Date   • Anxiety    • Back pain    • Depression    • Head injury    • Lumbar herniated disc    • Lumbosacral disc disease    • Obesity    • Obstructive sleep apnea     C-PAP mask not working/ not currently treating   • Panic disorder    • PTSD (post-traumatic stress disorder)    • Work related injury  10/30/2014    reinjured 8-13-15       Past Psychiatric History:  Began Treatment:approximately 10 or more years ago with  PCP at the time  Diagnoses:Depression, Anxiety and suspected PTSD  Psychiatrist:[facility]  Therapist:Lupillo through workman's comp prior to back surgery  Admission History:Denies  Medication Trials:Xanax and Zoloft initally with -ineffective; 10-12 yrs ago placed on Prozac-ineffective  Self Harm: Denies  Suicide Attempts:Denies      Substance Abuse History:   Types:Denies all, including illicit  Withdrawal Symptoms:Denies  Longest Period Sober:Not Applicable   AA: Not applicable     Social History:  Martial Status:  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, 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)  Kids:Yes or If so, how many 1 son age 15  House:Lives in a house   History: Denies  Access to Guns:  No    Social History     Socioeconomic History   • Marital status:    Tobacco Use   • Smoking status: Never Smoker   • Smokeless tobacco: Never Used   Vaping Use   • Vaping Use: Never used   Substance and Sexual Activity   • Alcohol use: No   • Drug use: Never   • Sexual activity: Yes       Family History:   Suicide Attempts: Denies  Suicide Completions:Denies      Family History   Problem Relation Age of Onset   • Diabetes Father        Developmental History:   Born: [place]  Siblings:1 brother  Childhood: physical,mental-by father  High School:Completed  College:Denies    Mental Status Exam:   Hygiene:   good  Cooperation:  Cooperative  Eye Contact:  Good  Psychomotor Behavior:  Appropriate  Affect:  Appropriate  Mood: depressed and anxious  Speech:  Normal  Thought Process:  Goal directed  Thought Content:  Mood congruent  Suicidal:  None  Homicidal:  None  Hallucinations:  None  Delusion:  None  Memory:  Intact  Orientation:  Person, Place, Time and Situation  Reliability:  good  Insight:  Good  Judgement:  Good  Impulse Control:  Good  Physical/Medical  "Issues:  Yes KAILASH-untreated due to claustraphobia with mask, L-Spine back pain     Review of Systems:  Review of Systems   Constitutional: Positive for diaphoresis and fatigue.   HENT: Negative for drooling.    Eyes: Negative for visual disturbance.   Respiratory: Negative for cough and shortness of breath.    Cardiovascular: Positive for chest pain and palpitations. Negative for leg swelling.   Gastrointestinal: Negative for nausea and vomiting.   Endocrine: Negative for cold intolerance and heat intolerance.   Genitourinary: Negative for difficulty urinating.   Musculoskeletal: Negative for joint swelling.   Allergic/Immunologic: Negative for immunocompromised state.   Neurological: Positive for dizziness. Negative for seizures, speech difficulty and numbness.   Psychiatric/Behavioral: Positive for decreased concentration. Negative for hallucinations, self-injury and sleep disturbance. The patient is nervous/anxious. The patient is not hyperactive.          Physical Exam:  Physical Exam    Vital Signs:   /90   Pulse 79   Ht 175.3 cm (69\")   Wt 114 kg (251 lb 12.8 oz)   SpO2 95%   BMI 37.18 kg/m²      Lab Results:   No visits with results within 6 Month(s) from this visit.   Latest known visit with results is:   Lab on 02/06/2021   Component Date Value Ref Range Status   • COVID19 02/06/2021 Not Detected  Not Detected - Ref. Range Final       EKG Results:  No orders to display       Imaging Results:  No Images in the past 120 days found..      Assessment & Plan   Diagnoses and all orders for this visit:    1. Generalized anxiety disorder (Primary)  -     venlafaxine XR (Effexor XR) 37.5 MG 24 hr capsule; Take 1 capsule by mouth Daily for 60 days. Indications: Generalized Anxiety Disorder, Major Depressive Disorder  Dispense: 30 capsule; Refill: 1  -     hydrOXYzine (ATARAX) 10 MG tablet; Take 1 tablet by mouth Every 6 (Six) Hours As Needed for Anxiety. Indications: Feeling Anxious  Dispense: 60 tablet; " Refill: 0  -     Ambulatory Referral to Psychotherapy    2. Severe episode of recurrent major depressive disorder, without psychotic features (HCC)  -     venlafaxine XR (Effexor XR) 37.5 MG 24 hr capsule; Take 1 capsule by mouth Daily for 60 days. Indications: Generalized Anxiety Disorder, Major Depressive Disorder  Dispense: 30 capsule; Refill: 1  -     Ambulatory Referral to Psychotherapy        Visit Diagnoses:    ICD-10-CM ICD-9-CM   1. Generalized anxiety disorder  F41.1 300.02   2. Severe episode of recurrent major depressive disorder, without psychotic features (HCC)  F33.2 296.33       PLAN:  1. Safety: No acute safety concerns  2. Therapy: Referral Made Elyssa Curtis [facility]  Address: [facility] [address] [facility] [address], [place] [address], Phone:  [phone]    Services offered: Family, couples, and individual therapy for a wide variety of areas, such as mood disorders, personality disorders, psychosis, addiction, anxiety, coping skills, grief, trauma and PTSD, transgender, self-harming, suicidal ideation, and other. Multiple types of therapy offered, including dialectical, trauma focused, and more.  Insurance accepted: [employer], [employer] [facility], [facility], [employer], Medicaid, WellCare, Out of Network.  3. Risk Assessment: Risk of self-harm acutely is moderate  Risk factors include anxiety disorder, mood disorder,fleeting negative thoughts, and recent psychosocial stressors (pandemic). Protective factors include no family history, denies access to guns/weapons, no present SI, no history of suicide attempts or self-harm in the past, minimal AODA, healthcare seeking, future orientation, willingness to engage in care.  Risk of self-harm chronically is also moderate, but could be further elevated in the event of treatment noncompliance and/or AODA.  4. Meds: Stop Prozac and Buspar as patient reported past and current ineffectiveness with Prozac and complaint of drowsiness with 10 mg of  Buspar which he was only taking as needed.  Informed and discussed potential signs and symptoms of serotonin syndrome which patient denied. May add Buspar at a lower dose at a later date due to initiation of another medication.     Start Effexor XR (Venlafaxine XR) 37.5 mg by mouth daily in the morning  to target depression and anxiety.  Will start slow and not increase at week 2 due to patient reported sensitivity with medications.  Risks, benefits, alternatives discussed with patient including anorexia, constipation, dizziness, dry mouth, nausea, nervousness, somnolence, sweating, sexual side effects, headache and insomnia. After discussion of these risks and benefits, the patient voiced understanding and agreed to proceed.      Change Hydroxyzine from 25 mg by mouth 3 times daily as needed TO 10 mg by mouth every 6 hrs by mouth as needed to target anxiety. Risks, benefits, alternatives discussed with patient including sedation, dizziness, fall risk, GI upset, and risk of increased CNS depression and elevated heart rate if taken with other antihistamines.  After discussion of these risks and benefits, the patient voiced understanding and agreed to proceed.  5. Labs:  N/a  6. MR authorization form signed to give [facility] consent to verify appointment of initial evaluation with Elyssa Curtis.  Form sent to provider via secure email site sendinc per provider request.       Patient presentation seems most consistent with depression and anxiety which is predominately related to current work environment, possible social anxiety, and anxiety with physical symptoms while driving which began after incident at current employer in March 2022.   Patient agreeable to switch from Prozac to Effexor XR, lowered Hydroxyzine dose due to drowsiness with 25 mg dose.  Patient blood pressure slightly elevated today which patient contributes to anxiety about appointment, prior blood pressure's 120's/70-80's.  Plan to explore PTSD  symptoms and social anxiety in future visits, Patient to contact provider if symptoms worsen or fail to improve.  Will have patient return in 5 weeks.     Patient screened positive for depression based on a PHQ-9 score of 23 on 8/9/2022. Follow-up recommendations include: Prescribed antidepressant medication treatment, Referral to Social Work and Suicide Risk Assessment performed.       TREATMENT PLAN/GOALS: Continue supportive psychotherapy efforts and medications as indicated. Treatment and medication options discussed during today's visit. Patient acknowledged and verbally consented to continue with current treatment plan and was educated on the importance of compliance with treatment and follow-up appointments.    MEDICATION ISSUES:  JAYCEE reviewed as expected. MANUAL JAYCEE 8/9/22 at 0701 #[id number] with no records.   Discussed medication options and treatment plan of prescribed medication as well as the risks, benefits, and side effects including potential falls, possible impaired driving and metabolic adversities among others. Patient is agreeable to call the office with any worsening of symptoms or onset of side effects. Patient is agreeable to call 911 or go to the nearest ER should he/she begin having SI/HI. No medication side effects or related complaints today.     MEDS ORDERED DURING VISIT:  New Medications Ordered This Visit   Medications   • venlafaxine XR (Effexor XR) 37.5 MG 24 hr capsule     Sig: Take 1 capsule by mouth Daily for 60 days. Indications: Generalized Anxiety Disorder, Major Depressive Disorder     Dispense:  30 capsule     Refill:  1   • hydrOXYzine (ATARAX) 10 MG tablet     Sig: Take 1 tablet by mouth Every 6 (Six) Hours As Needed for Anxiety. Indications: Feeling Anxious     Dispense:  60 tablet     Refill:  0     Discontinued 25 mg dose       Return in about 5 weeks (around 9/13/2022) for Next scheduled follow up.         I spent 68 minutes caring for Pranav on this date of  service. This time includes time spent by me in the following activities: preparing for the visit, reviewing tests, obtaining and/or reviewing a separately obtained history, performing a medically appropriate examination and/or evaluation, counseling and educating the patient/family/caregiver, ordering medications, tests, or procedures, referring and communicating with other health care professionals, documenting information in the medical record and care coordination.      This document has been electronically signed by CHINO Mcclain  August 9, 2022 22:26 EDT      Part of this note may be an electronic transcription/translation of spoken language to printed text using the Dragon Dictation System.

## 2022-08-18 VITALS — BODY MASS INDEX: 36.9 KG/M2 | WEIGHT: 249.12 LBS | HEIGHT: 69 IN

## 2022-08-18 NOTE — CONSULTS
"  Pranav Calderon is a 47 y.o. male who presents to [facility] for nutrition consult r/t diagnosis of overweight.  Pranav Calderon is referred by Dr. Ballard.    Past Medical History:   Diagnosis Date   • Anxiety    • Back pain    • Depression    • Head injury    • Lumbar herniated disc    • Lumbosacral disc disease    • Obesity    • Obstructive sleep apnea     C-PAP mask not working/ not currently treating   • Panic disorder    • PTSD (post-traumatic stress disorder)    • Work related injury 10/30/2014    reinjured 8-13-15       Anthropometrics    175.3 cm (69\")  113 kg (249 lb 1.9 oz)  36.79 kg/m²      Nutrition Information    Nutrition Information  Enter everything you can remember eating in the last 24 hours (1 day): breakfast/snack- something small to take w/ his meds, crackers; lunch- turkey burger, apple, vegetables; dinner- baked chicken, salad; snacks- sandwich; beverages- diet soda, flavored water  What is the biggest challenge you have with your diet?: Knowledge      Medications    Current Outpatient Medications   Medication   • hydrOXYzine   • venlafaxine XR       Labs         Lab Results   Component Value Date    TRIG 125 08/05/2020    HDL 52 08/05/2020    LDL 95 08/05/2020           Nutrition counseling provided on calorie counting, portion control, measuring and reading labels.  Discussed eating out and gave suggestions on controlling calorie intake and making healthier food choices.     Meal Plan:     Total Calories per day: 2000 calories  Lean protein with meals.  Limit added fats.  Discussed regular meals w/ snacks as needed or 5-6 small balanced meals/day.    Daily exercise encouraged (as recommended by healthcare provider). Discussed the benefits of exercise in lowering blood glucose, blood pressure, cholesterol, stress and controlling body weight.     Literature provided: sample menu, Choose Your Foods Booklet    Dietitian contact number provided.  Patient " encouraged to call with questions or concerns.     Time spent with patient: 30 minutes    Annabelle Gallardo RDN, LD  08/08/2022

## 2022-09-13 ENCOUNTER — OFFICE VISIT (OUTPATIENT)
Dept: BEHAVIORAL HEALTH | Facility: CLINIC | Age: 47
End: 2022-09-13

## 2022-09-13 VITALS
WEIGHT: 253.8 LBS | SYSTOLIC BLOOD PRESSURE: 110 MMHG | DIASTOLIC BLOOD PRESSURE: 80 MMHG | HEART RATE: 62 BPM | HEIGHT: 69 IN | BODY MASS INDEX: 37.59 KG/M2

## 2022-09-13 DIAGNOSIS — F33.2 SEVERE EPISODE OF RECURRENT MAJOR DEPRESSIVE DISORDER, WITHOUT PSYCHOTIC FEATURES: ICD-10-CM

## 2022-09-13 DIAGNOSIS — F51.05 INSOMNIA SECONDARY TO DEPRESSION WITH ANXIETY: ICD-10-CM

## 2022-09-13 DIAGNOSIS — F41.8 INSOMNIA SECONDARY TO DEPRESSION WITH ANXIETY: ICD-10-CM

## 2022-09-13 DIAGNOSIS — F41.1 GENERALIZED ANXIETY DISORDER: ICD-10-CM

## 2022-09-13 PROCEDURE — 99214 OFFICE O/P EST MOD 30 MIN: CPT | Performed by: NURSE PRACTITIONER

## 2022-09-13 RX ORDER — ESCITALOPRAM OXALATE 10 MG/1
TABLET ORAL
Qty: 30 TABLET | Refills: 0 | Status: SHIPPED | OUTPATIENT
Start: 2022-09-14 | End: 2022-10-10 | Stop reason: DRUGHIGH

## 2022-09-13 NOTE — PROGRESS NOTES
"Answers for HPI/ROS submitted by the patient on 9/11/2022  What is the primary reason for your visit?: Other  Please describe your symptoms.: Medicine check. Effexor not working for me. Sweating a lot and feel zoned out.  Have you had these symptoms before?: No  How long have you been having these symptoms?: Greater than 2 weeks  Please list any medications you are currently taking for this condition.: Effexor  Please describe any probable cause for these symptoms. : The Effexor    Subjective   Pranav Calderon is a 47 y.o. male who presents today for follow up    Referring Provider:  No referring provider defined for this encounter.    Chief Complaint:  Depression and anxiety, medication check    History of Present Illness:   9/13/22:  Patient presents today in office reporting  \"Effexor not working for me. Sweating a lot and feel zoned out\" and felt bloated, patient was started on 37.5 mg of Effexor at last appointment and decreased as needed Hydroxyzine from 25 to 10 mg by mouth every 6 hr as needed.  Patient reports stopped taking Effexor XR after almost 2 weeks due to adverse side effects.  Once patient stopped Effexor XR the symptoms resolved.     Patient tried lower dose of Hydroxyzine of 10 mg after stopping Effexor XR for 2 days and continued to feel tired, though not able to get to sleep, patient took off from work to help \"destress and difficulty sleeping.\" Patient has been off for approximately 2 weeks including scheduled off days.      Patient was able to schedule appointment with therapist for 9/28/22 at 2 pm.      Patient wife has been driving , although, patient did  son from school, and able to keep mind off of anxiety while talking to son.  School is 5-10 minutes away from patient home.     Patient does suffer from neck pain which worsens with CPAP/BiPap mask despite trying to loosen strap.  Not able to tolerate.    Patient most impairing symptom is the depression and anxiety as patient " "believes \"insomnia is a product of the anxiety.\" Patient complaint of inability to calm mind down, \"I don't try to think about it, it just hits me.\"       8/9/22:  INITIAL EVAL  Patient presents today in office with a history of anxiety and depression.   Patient began treatment approximately 10-12 yrs ago with PCP's, has never seen a behavioral health provider.  Patient initial demeanor presents as anxious, somewhat guarded, and flat, however, as visit progressed patient was very open with current stressors and feelings.      Depression: Patient complains of depression. He complains of anhedonia, depressed mood, difficulty concentrating, fatigue, feelings of worthlessness/guilt and negative thoughts of feeling family would be better off without him, though denies actual suicidal thoughts. Patient admits to \"if I was gone everyone would be better off, I would never do it, I just think about it, fleeting throughout different parts of the day.\"  Patient reports while working it is easier, however, endorses to increased anxiety related to work.  \"a lot of the issues stem from work as well.\"  Denies action plan.  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     Patient further describes details as \"the incident in March 2022, I was putting something away, forklift leaking I was unaware, switched vehicles, found leak and 2 days went by, and while working having some trouble with moving pallets, manager began yelling in my face\", patient began to have chest pain and coughing, " "and went to ED.  Patient was then given some acid reflex medications and had a cardiac workup which was negative.  Patient was then started on Prozac 20 mg 4/26/22.      Current manager is different than before, has had past problems with current female supervisor in 12/2020 while helping putting cylinders away, co-worker put in wrong bins but patient name was on the work, however, patient was punished and had told union, \" she had taken me out of the computer and she refused to put me back in the computer, had arguments prior to this incident,\" patient feels retaliation from this supervisor which has weighed on patient as supervisor now giving patient the \"cold shoulder\". And has difficulty with anxiety.  Patient reports past employer at [employer] did not have any problems, however, patient feels he does not share same  interests as fellow employees.  Patient has been at [employer] for 23 yrs, \"and I still feel like an outsider.\"     Patient admits to having feelings of chest pressure and minimal sweating to palms while driving, \"anytime I get an anxious or stressful situation almost,\" patient takes Hydroxyzine 25 mg and feels in zombie state, and 3-4 hrs later will take Buspar 10 mg which causes drowsiness, denies dizziness.  Patient believes anxiety symptoms while driving began after incident earlier this year with the forklift, March 2022.    Patient admits to only taking Buspar as needed, does not need or take while off work, and on weekends takes once if working as there is a \"lighter management\" .  Patient reports he is unable to take Prozac at exact same time with Buspar and hydroxyzine due to feelings of \"spacing out\".   Patient taking Prozac every morning which patient states, \"it makes me a little more social.  Not so much while driving to work, it's only 2 min.from my house. 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 \"High traffic area gets my chest going and I get anxious.\"          PHQ-9 Depression Screening  PHQ-9 Total Score:   8/9/2022 23 , reassess 11/2022    Little interest or pleasure in doing things?     Feeling down, depressed, or hopeless?     Trouble falling or staying asleep, or sleeping too much?     Feeling tired or having little energy?     Poor appetite or overeating?     Feeling bad about yourself - or that you are a failure or have let yourself or your family down?     Trouble concentrating on things, such as reading the newspaper or watching television?     Moving or speaking so slowly that other people could have noticed? Or the opposite - being so fidgety or restless that you have been moving around a lot more than usual?     Thoughts that you would be better off dead, or of hurting yourself in some way?     PHQ-9 Total Score       BEE-7    8/9/22 16, reassess 11/2022    Past Surgical History:  Past Surgical History:   Procedure Laterality Date   • LUMBAR DECOMPRESSION  03/09/2016    [facility]  L4-5 lumbar decomp disckectomy and TLIF  sextant mtrix and stealth   • LUMBAR FUSION Right 3/9/2016    Procedure: RT L4-5 LUMBAR DECOMPRESSION DISCECTOMY AND TLIF SEXTANT METRIX AND STEALTH;  Surgeon: Celestino Mackay MD;  Location: [facility] MAIN OR;  Service:    • PLANTAR FASCIA RELEASE Left 2/9/2021    Procedure: LEFT FOOT ENDOSCOPIC PLANTAR FASCIOTOMY;  Surgeon: Rafy Bauer DPM;  Location: [facility] MAIN OR;  Service: Podiatry;  Laterality: Left;   • SPINE SURGERY      lumbar spinal diskectomy L4/L5  L5/S1 2009   • WISDOM TOOTH EXTRACTION         Problem List:  Patient Active Problem List   Diagnosis   • Lumbosacral disc disease   • Work related injury   • Post-operative state   • Decreased sensation   • Low back pain   • Vitamin D deficiency   • Acquired hammer toe of left foot   • Cavus deformity of left foot   • Hyperkeratosis   • Pain in left foot   • Plantar fasciitis   • Dysthymia       Allergy:   No " Known Allergies     Discontinued Medications:  Medications Discontinued During This Encounter   Medication Reason   • venlafaxine XR (Effexor XR) 37.5 MG 24 hr capsule Side effects       Current Medications:   Current Outpatient Medications   Medication Sig Dispense Refill   • hydrOXYzine (ATARAX) 10 MG tablet Take 1 tablet by mouth Every 6 (Six) Hours As Needed for Anxiety. Indications: Feeling Anxious 60 tablet 0   • [START ON 9/14/2022] escitalopram (Lexapro) 10 MG tablet Take 0.5 tablets by mouth Daily for 7 days, THEN 1 tablet Daily for 26 days. 30 tablet 0     No current facility-administered medications for this visit.       Past Medical History:  Past Medical History:   Diagnosis Date   • Anxiety    • Back pain    • Depression    • Head injury    • Lumbar herniated disc    • Lumbosacral disc disease    • Obesity    • Obstructive sleep apnea     C-PAP mask not working/ not currently treating   • Panic disorder    • PTSD (post-traumatic stress disorder)    • Work related injury 10/30/2014    reinjured 8-13-15       Past Psychiatric History:  Began Treatment:approximately 10 or more years ago with  PCP at the time  Diagnoses:Depression, Anxiety and suspected PTSD  Psychiatrist:Denies  Therapist:2016 through [facility] comp prior to back surgery  Admission History:Denies  Medication Trials:Xanax and Zoloft initally [id number] with -ineffective; 10-12 yrs ago placed on Prozac-ineffective Effexor XR 8/9-9/13/22 sweating,zoned out,abd bloating; Buspar 10 mg as needed drowsiness  Self Harm: Denies  Suicide Attempts:Denies      Substance Abuse History:   Types:Denies all, including illicit  Withdrawal Symptoms:Denies  Longest Period Sober:Not Applicable   AA: Not applicable     Social History:  Martial Status:  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, 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)  Kids:Yes or If so, how many 1 son age 15  House:Lives in a house   History:  Denies  Access to Guns:  No    Social History     Socioeconomic History   • Marital status:    Tobacco Use   • Smoking status: Never Smoker   • Smokeless tobacco: Never Used   Vaping Use   • Vaping Use: Never used   Substance and Sexual Activity   • Alcohol use: No   • Drug use: Never   • Sexual activity: Yes       Family History:   Suicide Attempts: Denies  Suicide Completions:Denies      Family History   Problem Relation Age of Onset   • Diabetes Father        Developmental History:   Born: [place]  Siblings:1 brother  Childhood: physical,mental-by father  High School:Completed  College:Denies    Mental Status Exam:   Hygiene:   good  Cooperation:  Cooperative  Eye Contact:  Good  Psychomotor Behavior:  Appropriate  Affect:  Appropriate  Mood: depressed and anxious  Speech:  Normal  Thought Process:  Goal directed  Thought Content:  Mood congruent  Suicidal:  None  Homicidal:  None  Hallucinations:  None  Delusion:  None  Memory:  Intact  Orientation:  Person, Place, Time and Situation  Reliability:  good  Insight:  Good  Judgement:  Good  Impulse Control:  Good  Physical/Medical Issues:  Yes KAILASH-untreated due to claustraphobia with mask, L-Spine back pain     Review of Systems:  Review of Systems   Constitutional: Negative for diaphoresis and fatigue.   HENT: Negative for drooling.    Eyes: Negative for visual disturbance.   Respiratory: Negative for cough and shortness of breath.    Cardiovascular: Positive for chest pain. Negative for palpitations and leg swelling.   Gastrointestinal: Negative for nausea and vomiting.   Endocrine: Negative for cold intolerance and heat intolerance.   Genitourinary: Negative for difficulty urinating.   Musculoskeletal: Negative for joint swelling.   Allergic/Immunologic: Negative for immunocompromised state.   Neurological: Negative for dizziness, seizures, speech difficulty and numbness.   Psychiatric/Behavioral: Positive for decreased concentration and sleep  "disturbance. Negative for hallucinations and self-injury. The patient is nervous/anxious. The patient is not hyperactive.          Physical Exam:  Physical Exam  Psychiatric:         Attention and Perception: Attention and perception normal.         Mood and Affect: Affect normal. Mood is anxious and depressed.         Speech: Speech normal.         Behavior: Behavior normal. Behavior is cooperative.         Thought Content: Thought content normal. Thought content does not include suicidal ideation. Thought content does not include suicidal plan.         Cognition and Memory: Cognition and memory normal.         Judgment: Judgment normal.         Vital Signs:   /80   Pulse 62   Ht 175.3 cm (69\")   Wt 115 kg (253 lb 12.8 oz)   BMI 37.48 kg/m²      Lab Results:   No visits with results within 6 Month(s) from this visit.   Latest known visit with results is:   Lab on 02/06/2021   Component Date Value Ref Range Status   • COVID19 02/06/2021 Not Detected  Not Detected - Ref. Range Final       EKG Results:  No orders to display       Imaging Results:  No Images in the past 120 days found..      Assessment & Plan   Diagnoses and all orders for this visit:    1. Severe episode of recurrent major depressive disorder, without psychotic features (HCC)  -     escitalopram (Lexapro) 10 MG tablet; Take 0.5 tablets by mouth Daily for 7 days, THEN 1 tablet Daily for 26 days.  Dispense: 30 tablet; Refill: 0    2. Generalized anxiety disorder  -     escitalopram (Lexapro) 10 MG tablet; Take 0.5 tablets by mouth Daily for 7 days, THEN 1 tablet Daily for 26 days.  Dispense: 30 tablet; Refill: 0    3. Insomnia secondary to depression with anxiety        Visit Diagnoses:    ICD-10-CM ICD-9-CM   1. Severe episode of recurrent major depressive disorder, without psychotic features (HCC)  F33.2 296.33   2. Generalized anxiety disorder  F41.1 300.02   3. Insomnia secondary to depression with anxiety  F51.05 300.4    F41.8 327.02 " "      PLAN:  1. Safety: No acute safety concerns  2. Therapy: Referral Made previously.  Appointment scheduled with Elyssa Curtis [facility] 9/28/22 at 2 pm. Informed front office staff at [place] office to update referral.     3. Risk Assessment: Risk of self-harm acutely is moderate  Risk factors include anxiety disorder, mood disorder,fleeting negative thoughts, and recent psychosocial stressors (pandemic). Protective factors include no family history, denies access to guns/weapons, no present SI, no history of suicide attempts or self-harm in the past, minimal AODA, healthcare seeking, future orientation, willingness to engage in care.  Risk of self-harm chronically is also moderate, but could be further elevated in the event of treatment noncompliance and/or AODA.  4. Meds:  Stop Effexor XR (Venlafaxine XR) due to sweating, abdominal bloating, and feeling \"zoned out.\" patient stopped after nearly 2 weeks.     Advised patient to try to take Hydroxyzine 10 mg by mouth nightly as needed to target insomnia associated with anxiety.      START Escitalopram (LEXAPRO) 5 mg by mouth daily for 7 days, then increase to 10 mg by mouth daily to target depression, anxiety.  Risks, benefits, alternatives discussed with patient including GI upset, nausea vomiting diarrhea, theoretical decrease of seizure threshold predisposing the patient to a slightly higher seizure risk, headaches, sexual dysfunction, serotonin syndrome, bleeding risk.  After discussion of these risks and benefits, the patient voiced understanding and agreed to proceed.    5. Labs:  N/a      Patient unable to tolerate Effexor XR, has failed Prozac, Zoloft in the past, and as needed dose of 10 mg Buspar caused drowsiness.  Will try Lexapro, and avoid potential \"activating\" medications.  Patient wishes to proceed with treatment for anxiety and depression as these symptoms are contributing to insomnia.  Patient will start therapy in a few weeks which will " provide benefit for patient overall plan of care of goal to resolve symptoms of anxiety and depression.  Will see patient back in office in 5 weeks, Patient to contact provider if symptoms worsen or fail to improve.       8/9/22:   Start Effexor XR (Venlafaxine XR) 37.5 mg by mouth daily in the morning  to target depression and anxiety.  Will start slow and not increase at week 2 due to patient reported sensitivity with medications.  Risks, benefits, alternatives discussed with patient including anorexia, constipation, dizziness, dry mouth, nausea, nervousness, somnolence, sweating, sexual side effects, headache and insomnia. After discussion of these risks and benefits, the patient voiced understanding and agreed to proceed.      Change Hydroxyzine from 25 mg by mouth 3 times daily as needed TO 10 mg by mouth every 6 hrs by mouth as needed to target anxiety. Risks, benefits, alternatives discussed with patient including sedation, dizziness, fall risk, GI upset, and risk of increased CNS depression and elevated heart rate if taken with other antihistamines.  After discussion of these risks and benefits, the patient voiced understanding and agreed to proceed.      Referral to Elyssa Curtis [facility]  Address: [address] [facility] [address], [place] [address], Phone:  [phone] Patient to contact provider and set up appointment.  And to contact office if unable to get an appointment scheduled.   MR authorization form signed to give [facility] consent to verify appointment of initial evaluation with Elyssa Curtis.  Form sent to provider via secure email site sendinc per provider request.       Patient presentation seems most consistent with depression and anxiety which is predominately related to current work environment, possible social anxiety, and anxiety with physical symptoms while driving which began after incident at current employer in March 2022.   Patient agreeable to switch from Prozac to Effexor  XR, lowered Hydroxyzine dose due to drowsiness with 25 mg dose.  Patient blood pressure slightly elevated today which patient contributes to anxiety about appointment, prior blood pressure's 120's/70-80's.  Plan to explore PTSD symptoms and social anxiety in future visits, Patient to contact provider if symptoms worsen or fail to improve.  Will have patient return in 5 weeks.     Patient screened positive for depression based on a PHQ-9 score of 23 on 8/9/2022. Follow-up recommendations include: Prescribed antidepressant medication treatment, Referral to Social Work and Suicide Risk Assessment performed.       TREATMENT PLAN/GOALS: Continue supportive psychotherapy efforts and medications as indicated. Treatment and medication options discussed during today's visit. Patient acknowledged and verbally consented to continue with current treatment plan and was educated on the importance of compliance with treatment and follow-up appointments.    MEDICATION ISSUES:  JAYCEE reviewed as expected.  Discussed medication options and treatment plan of prescribed medication as well as the risks, benefits, and side effects including potential falls, possible impaired driving and metabolic adversities among others. Patient is agreeable to call the office with any worsening of symptoms or onset of side effects. Patient is agreeable to call 911 or go to the nearest ER should he/she begin having SI/HI. No medication side effects or related complaints today.     MEDS ORDERED DURING VISIT:  New Medications Ordered This Visit   Medications   • escitalopram (Lexapro) 10 MG tablet     Sig: Take 0.5 tablets by mouth Daily for 7 days, THEN 1 tablet Daily for 26 days.     Dispense:  30 tablet     Refill:  0     Discontinue Effexor XR       Return in about 5 weeks (around 10/18/2022) for Next scheduled follow up.         I spent 38 minutes caring for Pranav on this date of service. This time includes time spent by me in the following  activities: preparing for the visit, performing a medically appropriate examination and/or evaluation, counseling and educating the patient/family/caregiver, ordering medications, tests, or procedures, referring and communicating with other health care professionals, documenting information in the medical record and care coordination.      This document has been electronically signed by CHINO Mcclain  September 13, 2022 16:32 EDT      Part of this note may be an electronic transcription/translation of spoken language to printed text using the Dragon Dictation System.

## 2022-09-13 NOTE — PATIENT INSTRUCTIONS
"1.  Please return to clinic at your next scheduled visit.  Contact the clinic ([phone]) or Meri Medical Assistant at [place] Office directly at [phone] at least 24 hours prior in the event you need to cancel.    2.  Do no harm to yourself or others.    3.  Avoid alcohol and drugs.    4.  Take all medications as prescribed.  Please contact the clinic with any concerns. If you are in need of medication refills, please call the clinic at [phone].    5. Should you want to get in touch with your provider, CHINO Mcclain, please contact the office ([phone]), and staff will be able to page the provider on call directly.  (After hours & weekends only- otherwise [phone])  6.  In the event you have personal crisis, contact the following crisis numbers: Suicide Prevention Hotline 1-466.756.3476; [facility] Helpline 4-359-402-KZJW; [facility] Emergency Room [phone]; text HELLO to 488438; or 911.    7.  Please feel free to contact my Medical Assistant, Meri, directly at [phone], please leave a voice mail if you do not get an answer, and she will return your call within 24 hrs.  Recommend saving Meri's direct number in phone as this is the PREFERRED & EASIEST way to get in contact with your provider.     8.  You will NOT be able to contact provider on [facility], as Behavioral Health Providers are restricted. YOU MUST CALL     9.  We would appreciate your feedback, please scan the QRS code on the back of your appointment card (or see below) and complete a brief survey.  [place] location is still not available, so please click \"[place]\" location.  Thank you      SPECIFIC RECOMMENDATIONS:     1.      Medications discussed at this encounter:                   - Stop Effexor XR    START Escitalopram (LEXAPRO) 5 mg by mouth daily for 7 days, then increase to 10 mg by mouth daily    Patient to contact provider if symptoms worsen or fail to improve.      Try to take Hydroxyzine at " bedtime to see if that helps with your sleep.      2.      Psychotherapy recommendations:Appointment schedule 9/28/22 at 2pm     3.     Return to clinic: 5 weeks Meri will call you tomorrow to tariq.    Please arrive at least 15 minutes before your scheduled appointment time to complete check in process.

## 2022-10-10 DIAGNOSIS — F33.2 SEVERE EPISODE OF RECURRENT MAJOR DEPRESSIVE DISORDER, WITHOUT PSYCHOTIC FEATURES: ICD-10-CM

## 2022-10-10 DIAGNOSIS — F41.1 GENERALIZED ANXIETY DISORDER: ICD-10-CM

## 2022-10-10 RX ORDER — ESCITALOPRAM OXALATE 10 MG/1
10 TABLET ORAL EVERY MORNING
Qty: 30 TABLET | Refills: 0 | Status: SHIPPED | OUTPATIENT
Start: 2022-10-10 | End: 2022-10-11

## 2022-10-18 ENCOUNTER — OFFICE VISIT (OUTPATIENT)
Dept: BEHAVIORAL HEALTH | Facility: CLINIC | Age: 47
End: 2022-10-18

## 2022-10-18 VITALS
SYSTOLIC BLOOD PRESSURE: 120 MMHG | WEIGHT: 258.4 LBS | DIASTOLIC BLOOD PRESSURE: 70 MMHG | HEIGHT: 69 IN | BODY MASS INDEX: 38.27 KG/M2 | HEART RATE: 91 BPM

## 2022-10-18 DIAGNOSIS — F41.8 INSOMNIA SECONDARY TO DEPRESSION WITH ANXIETY: ICD-10-CM

## 2022-10-18 DIAGNOSIS — F41.1 GENERALIZED ANXIETY DISORDER: ICD-10-CM

## 2022-10-18 DIAGNOSIS — F51.05 INSOMNIA SECONDARY TO DEPRESSION WITH ANXIETY: ICD-10-CM

## 2022-10-18 DIAGNOSIS — F33.2 SEVERE EPISODE OF RECURRENT MAJOR DEPRESSIVE DISORDER, WITHOUT PSYCHOTIC FEATURES: ICD-10-CM

## 2022-10-18 PROCEDURE — 99215 OFFICE O/P EST HI 40 MIN: CPT | Performed by: NURSE PRACTITIONER

## 2022-10-18 RX ORDER — FLUOXETINE HYDROCHLORIDE 20 MG/1
20 CAPSULE ORAL EVERY MORNING
Qty: 30 CAPSULE | Refills: 1 | Status: SHIPPED | OUTPATIENT
Start: 2022-10-18 | End: 2022-12-15 | Stop reason: SDUPTHER

## 2022-10-18 NOTE — PROGRESS NOTES
"Answers for HPI/ROS submitted by the patient on 10/11/2022  What is the primary reason for your visit?: Other  Please describe your symptoms.: Medicine check for depression anxiety and PTSD  Have you had these symptoms before?: Yes  How long have you been having these symptoms?: Greater than 2 weeks  Please list any medications you are currently taking for this condition.: Escitalopram 10mg  Please describe any probable cause for these symptoms. : Work, life in general      Subjective   Pranav Calderon is a 47 y.o. male who presents today for follow up    Referring Provider:  Fauzia Ballard MD  [address] [facility]  [address]  [place] [address]    Chief Complaint:  Depression and anxiety, medication check    History of Present Illness:   10/18/22:  Patient presents today in office reporting tolerating Lexapro initially, though having difficulty falling asleep, and approximately 1 week ago stopped taking Lexapro and noticed immediately those symptoms resolved.  As now patient is not having difficulty going to sleep.  Patient tried to take lower dose of Hydroxyzine 10 mg and continued to feel groggy the following day.     Patient reports started therapy with Elyssa and has upcoming appointment 10/25/22.     Patient reports switched shifts from 8a-4pm to 4 pm-12 midnight, which started 2 weeks ago, continues to have chest pain with anxiety while driving, sleep has improved, however, depression rates 6 our of 10, and anxiety 8or9 /10.  \"This hasn't been the best year.\"  Difficulty's with employer, finances, son, life stressors.     Patient further explains he was having difficulty sleeping while taking Lexapro at lower dose, though open to possible reason related to anxiety.   Recalls while taking Prozac also took Hydroxyzine at night which suspect feelings of grogginess, \"feeling so crappy was a combination of the two together.\"     9/13/22:  Patient presents today in office reporting  \"Effexor not working " "for me. Sweating a lot and feel zoned out\" and felt bloated, patient was started on 37.5 mg of Effexor at last appointment and decreased as needed Hydroxyzine from 25 to 10 mg by mouth every 6 hr as needed.  Patient reports stopped taking Effexor XR after almost 2 weeks due to adverse side effects.  Once patient stopped Effexor XR the symptoms resolved.     Patient tried lower dose of Hydroxyzine of 10 mg after stopping Effexor XR for 2 days and continued to feel tired, though not able to get to sleep, patient took off from work to help \"destress and difficulty sleeping.\" Patient has been off for approximately 2 weeks including scheduled off days.      Patient was able to schedule appointment with therapist for 9/28/22 at 2 pm.      Patient wife has been driving , although, patient did  son from school, and able to keep mind off of anxiety while talking to son.  School is 5-10 minutes away from patient home.     Patient does suffer from neck pain which worsens with CPAP/BiPap mask despite trying to loosen strap.  Not able to tolerate.    Patient most impairing symptom is the depression and anxiety as patient believes \"insomnia is a product of the anxiety.\" Patient complaint of inability to calm mind down, \"I don't try to think about it, it just hits me.\"       8/9/22:  INITIAL EVAL  Patient presents today in office with a history of anxiety and depression.   Patient began treatment approximately 10-12 yrs ago with PCP's, has never seen a behavioral health provider.  Patient initial demeanor presents as anxious, somewhat guarded, and flat, however, as visit progressed patient was very open with current stressors and feelings.      Depression: Patient complains of depression. He complains of anhedonia, depressed mood, difficulty concentrating, fatigue, feelings of worthlessness/guilt and negative thoughts of feeling family would be better off without him, though denies actual suicidal thoughts. Patient admits " "to \"if I was gone everyone would be better off, I would never do it, I just think about it, fleeting throughout different parts of the day.\"  Patient reports while working it is easier, however, endorses to increased anxiety related to work.  \"a lot of the issues stem from work as well.\"  Denies action plan.  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  Patient was then given some acid reflex medications and had a cardiac workup which was negative.  Patient was then started on Prozac 20 mg 4/26/22.      Current manager is different than before, has had past problems with current female supervisor in 12/2020 while helping putting cylinders away, co-worker put in wrong bins but patient name was on the work, however, patient was punished and had told union, \" she had taken me out of the computer and she refused to put me back in the computer, had arguments prior to this incident,\" patient feels retaliation from this supervisor which has weighed on patient as supervisor now giving patient the \"cold shoulder\". And has difficulty with anxiety.  Patient reports past employer at [employer] did not have any problems, however, patient " "feels he does not share same  interests as fellow employees.  Patient has been at [employer] for 23 yrs, \"and I still feel like an outsider.\"     Patient admits to having feelings of chest pressure and minimal sweating to palms while driving, \"anytime I get an anxious or stressful situation almost,\" patient takes Hydroxyzine 25 mg and feels in zombie state, and 3-4 hrs later will take Buspar 10 mg which causes drowsiness, denies dizziness.  Patient believes anxiety symptoms while driving began after incident earlier this year with the "[employer]lift, March 2022.    Patient admits to only taking Buspar as needed, does not need or take while off work, and on weekends takes once if working as there is a \"lighter management\" .  Patient reports he is unable to take Prozac at exact same time with Buspar and hydroxyzine due to feelings of \"spacing out\".   Patient taking Prozac every morning which patient states, \"it makes me a little more social.  Not so much while driving to work, it's only 2 min.from my house. 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 \"High traffic area gets my chest going and I get anxious.\"          PHQ-9 Depression Screening  PHQ-9 Total Score:   8/9/2022 23 , reassess 12/2022    Little interest or pleasure in doing things?     Feeling down, depressed, or hopeless?     Trouble falling or staying asleep, or sleeping too much?     Feeling tired or having little energy?     Poor appetite or overeating?     Feeling bad about yourself - or that you are a failure or have let yourself or your family down?     Trouble concentrating on things, such as reading the newspaper or watching television?     Moving or speaking so slowly that other people could have noticed? Or the opposite - being so fidgety or restless that you have been moving around a lot more than usual?     Thoughts that you would be better off dead, or of hurting yourself " in some way?     PHQ-9 Total Score       BEE-7    8/9/22 16, reassess 12/2022    Past Surgical History:  Past Surgical History:   Procedure Laterality Date   • LUMBAR DECOMPRESSION  03/09/2016    [facility]  L4-5 lumbar decomp disckectomy and TLIF  sextant mtrix and stealth   • LUMBAR FUSION Right 3/9/2016    Procedure: RT L4-5 LUMBAR DECOMPRESSION DISCECTOMY AND TLIF SEXTANT METRIX AND STEALTH;  Surgeon: Celestino Mackay MD;  Location: [facility] MAIN OR;  Service:    • PLANTAR FASCIA RELEASE Left 2/9/2021    Procedure: LEFT FOOT ENDOSCOPIC PLANTAR FASCIOTOMY;  Surgeon: Rafy Bauer DPM;  [place] [facility] [place];  Service: Podiatry;  Laterality: Left;   • SPINE SURGERY      lumbar spinal diskectomy L4/L5  L5/S1 2009   • WISDOM TOOTH EXTRACTION         Problem List:  Patient Active Problem List   Diagnosis   • Lumbosacral disc disease   • Work related injury   • Post-operative state   • Decreased sensation   • Low back pain   • Vitamin D deficiency   • Acquired hammer toe of left foot   • Cavus deformity of left foot   • Hyperkeratosis   • Pain in left foot   • Plantar fasciitis   • Dysthymia       Allergy:   No Known Allergies     Discontinued Medications:  Medications Discontinued During This Encounter   Medication Reason   • hydrOXYzine (ATARAX) 10 MG tablet *Therapy completed   • escitalopram (LEXAPRO) 10 MG tablet Other- See Medication Note       Current Medications:   Current Outpatient Medications   Medication Sig Dispense Refill   • FLUoxetine (PROzac) 20 MG capsule Take 1 capsule by mouth Every Morning for 60 days. Indications: Generalized Anxiety Disorder, Major Depressive Disorder, Panic Disorder 30 capsule 1     No current facility-administered medications for this visit.       Past Medical History:  Past Medical History:   Diagnosis Date   • Anxiety    • Back pain    • Depression    • Head injury    • Lumbar herniated disc    • Lumbosacral disc disease    • Obesity    • Obstructive sleep apnea     C-PAP mask  not working/ not currently treating   • Panic disorder    • PTSD (post-traumatic stress disorder)    • Work related injury 10/30/2014    reinjured 8-13-15       Past Psychiatric History:  Began Treatment:approximately 10 or more years ago with  PCP at the time  Diagnoses:Depression, Anxiety and suspected PTSD  Psychiatrist:Denies  Therapist:2016 through workman's comp prior to back surgery  Admission History:Denies  Medication Trials:Xanax and Zoloft initally [id number] with -ineffective; 10-12 yrs ago placed on Prozac-ineffective Effexor XR 8/9-9/13/22 sweating,zoned out,abd bloating; Buspar 10 mg as needed drowsiness; Hydroxyzine-drowsiness with low dose of 10 mg, next day grogginess ; Lexapro-insomnia after 5 weeks, possible related to anxiety, though self stopped mid Oct.2022  Self Harm: Denies  Suicide Attempts:Denies      Substance Abuse History:   Types:Denies all, including illicit  Withdrawal Symptoms:Denies  Longest Period Sober:Not Applicable   AA: Not applicable     Social History:  Martial Status:  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, 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)  Kids:Yes or If so, how many 1 son age 15  House:Lives in a house   History: Denies  Access to Guns:  No    Social History     Socioeconomic History   • Marital status:    Tobacco Use   • Smoking status: Never   • Smokeless tobacco: Never   Vaping Use   • Vaping Use: Never used   Substance and Sexual Activity   • Alcohol use: No   • Drug use: Never   • Sexual activity: Yes       Family History:   Suicide Attempts: Denies  Suicide Completions:Denies      Family History   Problem Relation Age of Onset   • Diabetes Father        Developmental History:   Born: [place]  Siblings:1 brother  Childhood: physical,mental-by father  High School:Completed  College:Denies    Mental Status Exam:   Hygiene:   good  Cooperation:  Cooperative  Eye Contact:  Good  Psychomotor Behavior:   Appropriate  Affect:  Appropriate  Mood: depressed and anxious  Speech:  Normal  Thought Process:  Goal directed  Thought Content:  Mood congruent  Suicidal:  None  Homicidal:  None  Hallucinations:  None  Delusion:  None  Memory:  Intact  Orientation:  Person, Place, Time and Situation  Reliability:  good  Insight:  Good  Judgement:  Good  Impulse Control:  Good  Physical/Medical Issues:  Yes KAILASH-untreated due to claustraphobia with mask, L-Spine back pain     Review of Systems:  Review of Systems   Constitutional: Negative for diaphoresis and fatigue.   HENT: Negative for drooling.    Eyes: Negative for visual disturbance.   Respiratory: Negative for cough and shortness of breath.    Cardiovascular: Positive for chest pain. Negative for palpitations and leg swelling.        With anxiety   Gastrointestinal: Negative for nausea and vomiting.   Endocrine: Negative for cold intolerance and heat intolerance.   Genitourinary: Negative for difficulty urinating.   Musculoskeletal: Negative for joint swelling.   Allergic/Immunologic: Negative for immunocompromised state.   Neurological: Negative for dizziness, seizures, speech difficulty and numbness.   Psychiatric/Behavioral: Positive for decreased concentration and sleep disturbance. Negative for hallucinations, self-injury and suicidal ideas. The patient is nervous/anxious. The patient is not hyperactive.          Physical Exam:  Physical Exam  Psychiatric:         Attention and Perception: Attention and perception normal.         Mood and Affect: Affect normal. Mood is anxious and depressed.         Speech: Speech normal.         Behavior: Behavior normal. Behavior is cooperative.         Thought Content: Thought content normal. Thought content does not include suicidal ideation. Thought content does not include suicidal plan.         Cognition and Memory: Cognition and memory normal.         Judgment: Judgment normal.         Vital Signs:   /70   Pulse 91   Ht  "175.3 cm (69\")   Wt 117 kg (258 lb 6.4 oz)   BMI 38.16 kg/m²      Lab Results:   No visits with results within 6 Month(s) from this visit.   Latest known visit with results is:   Lab on 02/06/2021   Component Date Value Ref Range Status   • COVID19 02/06/2021 Not Detected  Not Detected - Ref. Range Final       EKG Results:  No orders to display       Imaging Results:  No Images in the past 120 days found..      Assessment & Plan   Diagnoses and all orders for this visit:    1. Severe episode of recurrent major depressive disorder, without psychotic features (HCC)  -     FLUoxetine (PROzac) 20 MG capsule; Take 1 capsule by mouth Every Morning for 60 days. Indications: Generalized Anxiety Disorder, Major Depressive Disorder, Panic Disorder  Dispense: 30 capsule; Refill: 1    2. Generalized anxiety disorder  -     FLUoxetine (PROzac) 20 MG capsule; Take 1 capsule by mouth Every Morning for 60 days. Indications: Generalized Anxiety Disorder, Major Depressive Disorder, Panic Disorder  Dispense: 30 capsule; Refill: 1    3. Insomnia secondary to depression with anxiety        Visit Diagnoses:    ICD-10-CM ICD-9-CM   1. Severe episode of recurrent major depressive disorder, without psychotic features (HCC)  F33.2 296.33   2. Generalized anxiety disorder  F41.1 300.02   3. Insomnia secondary to depression with anxiety  F51.05 300.4    F41.8 327.02       PLAN:  1. Safety: No acute safety concerns  2. Therapy: Currently Seeing a Therapist SHIRLEY Leslie  3. Risk Assessment: Risk of self-harm acutely is moderate  Risk factors include anxiety disorder, mood disorder,fleeting negative thoughts, and recent psychosocial stressors (pandemic). Protective factors include no family history, denies access to guns/weapons, no present SI, no history of suicide attempts or self-harm in the past, minimal AODA, healthcare seeking, future orientation, willingness to engage in care.  Risk of self-harm chronically is also moderate, " but could be further elevated in the event of treatment noncompliance and/or AODA.  4. Meds:  Start Prozac (Fluoxetine) 20 mg by mouth daily in the morning to target anxiety and depression.  Discussed all risks, benefits, alternatives, and side effects of Fluoxetine including but not limited to GI upset, decreased appetite, sexual dysfunction, bleeding risk, seizure risk, insomnia, anxiety, drowsiness, headache, tremor, nervousness, activation of kena or hypomania, increased fragility fracture risk, hyponatremia, ocular effects, serotonin syndrome, hypersensitivity reaction, and activation of suicidal ideation and behavior. Patient educated on the need to practice safe sex while taking this medication. Discussed the need for patient to immediately call the office for any new or worsening symptoms, such as worsening depression; feeling nervous or restless; suicidal thoughts or actions; or other changes in mood or behavior, and all other concerns. Patient educated on medication compliance.  Patient verbalized understanding and is agreeable to taking Fluoxetine. Addressed all questions and concerns.     5. Labs:  N/a    Patient willing to try Prozac again as while taking 10-12 yrs ago was taking with Hydroxyzine and believes that combination made him feel worse.  Suspect Lexapro needed to be a higher dose as patient self stopped approximately 1 week ago due to inability to go to sleep easily, and symptoms subsided for the most part since stopping Lexapro. Symptoms of depression and anxiety remain present and agreeable to try another medication. Unable to tolerate Hydroxyzine at lower dose due to sedation.   Patient to contact provider if symptoms worsen or fail to improve.     9/13/22:   Therapy referral previously.  Appointment scheduled with Elyssa WATSON 9/28/22 at 2 pm. Informed front office staff at [place] office to update referral.   Stop Effexor XR (Venlafaxine XR) due to sweating, abdominal  "bloating, and feeling \"zoned out.\" patient stopped after nearly 2 weeks.     Advised patient to try to take Hydroxyzine 10 mg by mouth nightly as needed to target insomnia associated with anxiety.      START Escitalopram (LEXAPRO) 5 mg by mouth daily for 7 days, then increase to 10 mg by mouth daily to target depression, anxiety.  Risks, benefits, alternatives discussed with patient including GI upset, nausea vomiting diarrhea, theoretical decrease of seizure threshold predisposing the patient to a slightly higher seizure risk, headaches, sexual dysfunction, serotonin syndrome, bleeding risk.  After discussion of these risks and benefits, the patient voiced understanding and agreed to proceed.    Patient unable to tolerate Effexor XR, has failed Prozac, Zoloft in the past, and as needed dose of 10 mg Buspar caused drowsiness.  Will try Lexapro, and avoid potential \"activating\" medications.  Patient wishes to proceed with treatment for anxiety and depression as these symptoms are contributing to insomnia.  Patient will start therapy in a few weeks which will provide benefit for patient overall plan of care of goal to resolve symptoms of anxiety and depression.  Will see patient back in office in 5 weeks, Patient to contact provider if symptoms worsen or fail to improve.       8/9/22:   Start Effexor XR (Venlafaxine XR) 37.5 mg by mouth daily in the morning  to target depression and anxiety.  Will start slow and not increase at week 2 due to patient reported sensitivity with medications.  Risks, benefits, alternatives discussed with patient including anorexia, constipation, dizziness, dry mouth, nausea, nervousness, somnolence, sweating, sexual side effects, headache and insomnia. After discussion of these risks and benefits, the patient voiced understanding and agreed to proceed.      Change Hydroxyzine from 25 mg by mouth 3 times daily as needed TO 10 mg by mouth every 6 hrs by mouth as needed to target anxiety. " Risks, benefits, alternatives discussed with patient including sedation, dizziness, fall risk, GI upset, and risk of increased CNS depression and elevated heart rate if taken with other antihistamines.  After discussion of these risks and benefits, the patient voiced understanding and agreed to proceed.      Referral to Elyssa Curtis [facility]  Address: [address], [place] [address], Phone:  [phone] Patient to contact provider and set up appointment.  And to contact office if unable to get an appointment scheduled.   MR authorization form signed to give [facility] consent to verify appointment of initial evaluation with Elyssa Curtis.  Form sent to provider via secure email site sendinc per provider request.       Patient presentation seems most consistent with depression and anxiety which is predominately related to current work environment, possible social anxiety, and anxiety with physical symptoms while driving which began after incident at current employer in March 2022.   Patient agreeable to switch from Prozac to Effexor XR, lowered Hydroxyzine dose due to drowsiness with 25 mg dose.  Patient blood pressure slightly elevated today which patient contributes to anxiety about appointment, prior blood pressure's 120's/70-80's.  Plan to explore PTSD symptoms and social anxiety in future visits, Patient to contact provider if symptoms worsen or fail to improve.  Will have patient return in 5 weeks.     Patient screened positive for depression based on a PHQ-9 score of 23 on 8/9/2022. Follow-up recommendations include: Prescribed antidepressant medication treatment, Referral to Social Work and Suicide Risk Assessment performed.       TREATMENT PLAN/GOALS: Continue supportive psychotherapy efforts and medications as indicated. Treatment and medication options discussed during today's visit. Patient acknowledged and verbally consented to continue with current treatment plan and was  educated on the importance of compliance with treatment and follow-up appointments.    MEDICATION ISSUES:  JAYCEE reviewed as expected.  Discussed medication options and treatment plan of prescribed medication as well as the risks, benefits, and side effects including potential falls, possible impaired driving and metabolic adversities among others. Patient is agreeable to call the office with any worsening of symptoms or onset of side effects. Patient is agreeable to call 911 or go to the nearest ER should he/she begin having SI/HI. No medication side effects or related complaints today.     MEDS ORDERED DURING VISIT:  New Medications Ordered This Visit   Medications   • FLUoxetine (PROzac) 20 MG capsule     Sig: Take 1 capsule by mouth Every Morning for 60 days. Indications: Generalized Anxiety Disorder, Major Depressive Disorder, Panic Disorder     Dispense:  30 capsule     Refill:  1     Stopped Lexapro 1 week ago       Return in about 6 weeks (around 11/29/2022) for Next scheduled follow up.         I spent 46 minutes caring for Pranav on this date of service. This time includes time spent by me in the following activities: preparing for the visit, performing a medically appropriate examination and/or evaluation, counseling and educating the patient/family/caregiver, ordering medications, tests, or procedures, referring and communicating with other health care professionals, documenting information in the medical record and care coordination.      This document has been electronically signed by CHINO Mcclain  October 18, 2022 10:50 EDT      Part of this note may be an electronic transcription/translation of spoken language to printed text using the Dragon Dictation System.

## 2022-10-18 NOTE — PATIENT INSTRUCTIONS
"1.  Please return to clinic at your next scheduled visit.  Contact the [facility] ([phone]) or **Meri, Medical Assistant at [place] [facility] directly at [phone] at least 24 hours prior in the event you need to cancel.**    2. Should you want to get in touch with your provider, CHINO Mcclain, please contact MY Medical Assistant, Meri, directly at [phone].  Recommend saving Meri's direct number in phone as this is the PREFERRED & EASIEST way to get in contact with your provider.  Please leave a voice mail if you do not get an answer and she will return your call within 24 hrs. You will NOT be able to contact provider on [facility], as Behavioral Health Providers are restricted. YOU MUST CALL [phone]    If you need to speak with the on call provider after hours or on weekends, please Contact the [facility] ([phone]) and staff will be able to page the provider on call directly.        3, MEDICATION REFILLS:  PLEASE CALL THE PHARMACY TO REQUEST ALL MEDICATION REFILLS TO ENSURE YOU ARE RECEIVING YOUR MEDICATIONS IN A TIMELY MANNER.    IF YOU USE AN AUTOMATED SERVICE AT THE PHARMACY FOR REFILLS AND ARE TOLD THERE ARE \"NO REFILLS REMAINING\"   PLEASE CALL THE PHARMACY & SPEAK TO A LIVE PERSON TO VERIFY IT IS THE MOST UP TO DATE PRESCRIPTION ON FILE.    All new prescriptions will have a different number, therefore, if you were given refills for a medication today or at last visit it will not have the same number as the previous prescription.       4.  In the event you have personal crisis, contact the following crisis numbers: Suicide Prevention Hotline 1-756.102.1879 or *988, RABIA Helpline 5-457-954-RABIA; [facility] [phone]; text HELLO to 380620; or 773.      5. We would appreciate your feedback, please scan the QRS code on the back of your appointment card (or see below) and complete a brief survey.  [place] location is still not available, so " "please click \"[place]\" location.  Thank you      SPECIFIC RECOMMENDATIONS:     1.      Medications discussed at this encounter:                   - Start Prozac 20 mg by mouth daily in the morning      2.      Psychotherapy recommendations: Continue with current therapist. Declined     3.     Return to clinic: 6 weeks    Please arrive at least 15 minutes before your scheduled appointment time to complete check in process.      IF you are scheduled for a [employer] VIDEO visit, PLEASE ANSWER YOUR PHONE WHEN OFFICE CALLS PRIOR TO VISIT TO COMPLETE THE CHECK IN PROCESS, EVEN IF THE E-CHECK IN WAS COMPLETED.     If you would like to log on to [employer] and complete the \"E-Check IN\" prior to your visit, please do so, this will speed up the check in process.  If you are due for questionnaires, you will find those on [employer] as well, please try to complete prior to your scheduled appointment.          "

## 2022-11-28 ENCOUNTER — TELEPHONE (OUTPATIENT)
Dept: BEHAVIORAL HEALTH | Facility: CLINIC | Age: 47
End: 2022-11-28

## 2022-12-14 DIAGNOSIS — F33.2 SEVERE EPISODE OF RECURRENT MAJOR DEPRESSIVE DISORDER, WITHOUT PSYCHOTIC FEATURES: ICD-10-CM

## 2022-12-14 DIAGNOSIS — F41.1 GENERALIZED ANXIETY DISORDER: ICD-10-CM

## 2022-12-15 RX ORDER — FLUOXETINE HYDROCHLORIDE 20 MG/1
CAPSULE ORAL
Qty: 30 CAPSULE | Refills: 0 | Status: SHIPPED | OUTPATIENT
Start: 2022-12-15 | End: 2023-01-16

## 2022-12-15 NOTE — TELEPHONE ENCOUNTER
[facility] Protocol Passed 12/14/2022 07:56 PM   Protocol Details  Blood pressure on record in past 15 months    PHQ2 or PHQ9 on record in past 12 months    Recent or future visit with authorizing provider     NEXT APPT WITH PROVIDER  Appointment with Archana Macario APRN (01/09/2023)    LAST MED REFILL  FLUoxetine (PROzac) 20 MG capsule (10/18/2022)    QTY 30 WITH 1 REFILL    PROVIDER PLEASE ADVISE

## 2023-01-14 DIAGNOSIS — F41.1 GENERALIZED ANXIETY DISORDER: ICD-10-CM

## 2023-01-14 DIAGNOSIS — F33.2 SEVERE EPISODE OF RECURRENT MAJOR DEPRESSIVE DISORDER, WITHOUT PSYCHOTIC FEATURES: ICD-10-CM

## 2023-01-16 RX ORDER — FLUOXETINE HYDROCHLORIDE 20 MG/1
CAPSULE ORAL
Qty: 30 CAPSULE | Refills: 0 | Status: SHIPPED | OUTPATIENT
Start: 2023-01-16 | End: 2023-01-31 | Stop reason: DRUGHIGH

## 2023-01-16 NOTE — TELEPHONE ENCOUNTER
[facility] Protocol Passed 01/14/2023 02:50 PM   Protocol Details  Blood pressure on record in past 15 months    PHQ2 or PHQ9 on record in past 12 months    Recent or future visit with authorizing provider     NEXT APPT WITH PROVIDER  Appointment with Archana Macario APRN (01/31/2023)    LAST MED REFILL  FLUoxetine (PROzac) 20 MG capsule (12/15/2022)    QTY 30 WITH NO REFILLS     PROVIDER PLEASE ADVISE

## 2023-01-31 ENCOUNTER — OFFICE VISIT (OUTPATIENT)
Dept: BEHAVIORAL HEALTH | Facility: CLINIC | Age: 48
End: 2023-01-31
Payer: COMMERCIAL

## 2023-01-31 VITALS
HEIGHT: 69 IN | SYSTOLIC BLOOD PRESSURE: 109 MMHG | HEART RATE: 86 BPM | DIASTOLIC BLOOD PRESSURE: 78 MMHG | BODY MASS INDEX: 38.24 KG/M2 | WEIGHT: 258.2 LBS

## 2023-01-31 DIAGNOSIS — F41.1 GENERALIZED ANXIETY DISORDER: Primary | ICD-10-CM

## 2023-01-31 DIAGNOSIS — F33.1 MAJOR DEPRESSIVE DISORDER, RECURRENT EPISODE, MODERATE: ICD-10-CM

## 2023-01-31 PROCEDURE — 99214 OFFICE O/P EST MOD 30 MIN: CPT | Performed by: NURSE PRACTITIONER

## 2023-01-31 RX ORDER — FLUOXETINE HYDROCHLORIDE 40 MG/1
40 CAPSULE ORAL EVERY MORNING
Qty: 30 CAPSULE | Refills: 2 | Status: SHIPPED | OUTPATIENT
Start: 2023-01-31 | End: 2023-05-01

## 2023-01-31 NOTE — PROGRESS NOTES
"Answers for HPI/ROS submitted by the patient on 1/24/2023  What is the primary reason for your visit?: Other  Please describe your symptoms.: Depression Anxiety PTSD  Have you had these symptoms before?: Yes  How long have you been having these symptoms?: Greater than 2 weeks  Please list any medications you are currently taking for this condition.: Fluoxetine 20 MG    Subjective   Pranav Calderon is a 47 y.o. male who presents today for follow up    Referring Provider:  Fauzia Ballard MD  [address] [facility]  [address]  [place] [address]    Chief Complaint:  Depression and anxiety, medication check    History of Present Illness:    1/31/23:  Patient presents today in office \"I feel like the problems are still there, but I am dealing with them better, I am not stressing about it as much at home.  Started Prozac at last visit, for which patient feels has helped improve mood.  Patient explains in Nov 2022 had some financial concerns, and was able to refinance both vehicles which lowered monthly payments, and \"I felt like if I didn't have the Prozac I wouldn't have been able to do that.\"      Patient denies SI, though at times has thoughts \"if I was gone they would be happier financially, because of monthly child support.\"  Son is 16 currently. Denies feelings of harming self or others, denies rumination of thoughts or action plan.      Patient takes Prozac later in morning at 11 am-12 noon due to working 2nd shift, though at times will not go to sleep until 3 am.  Patient had difficulty with sleep prior to starting Prozac as patient has KAILASH and is unable to wear the mask.  Recently bought a mouth guard to help with snoring.  Denies gasping for air, though wife says patient has in the past. \"I do snore really loud.\"    Patient continues to see therapist, which is going well.       Depression: Patient complains of depression. He complains of anhedonia, depressed mood, fatigue, feelings of worthlessness/guilt " "and insomnia    Anxiety:  The patient endorses significant symptoms of anxiety including: excessive anxiety and worry about a number of events or activities for more days than not, being easily fatigued, difficulty concentrating or mind going blank, irritability and sleep disturbance which have caused impairment in important areas of daily functioning.        10/18/22:  Patient presents today in office reporting tolerating Lexapro initially, though having difficulty falling asleep, and approximately 1 week ago stopped taking Lexapro and noticed immediately those symptoms resolved.  As now patient is not having difficulty going to sleep.  Patient tried to take lower dose of Hydroxyzine 10 mg and continued to feel groggy the following day.     Patient reports started therapy with Elyssa and has upcoming appointment 10/25/22.     Patient reports switched shifts from 8a-4pm to 4 pm-12 midnight, which started 2 weeks ago, continues to have chest pain with anxiety while driving, sleep has improved, however, depression rates 6 our of 10, and anxiety 8or9 /10.  \"This hasn't been the best year.\"  Difficulty's with employer, finances, son, life stressors.     Patient further explains he was having difficulty sleeping while taking Lexapro at lower dose, though open to possible reason related to anxiety.   Recalls while taking Prozac also took Hydroxyzine at night which suspect feelings of grogginess, \"feeling so crappy was a combination of the two together.\"     9/13/22:  Patient presents today in office reporting  \"Effexor not working for me. Sweating a lot and feel zoned out\" and felt bloated, patient was started on 37.5 mg of Effexor at last appointment and decreased as needed Hydroxyzine from 25 to 10 mg by mouth every 6 hr as needed.  Patient reports stopped taking Effexor XR after almost 2 weeks due to adverse side effects.  Once patient stopped Effexor XR the symptoms resolved.     Patient tried lower dose of " "Hydroxyzine of 10 mg after stopping Effexor XR for 2 days and continued to feel tired, though not able to get to sleep, patient took off from work to help \"destress and difficulty sleeping.\" Patient has been off for approximately 2 weeks including scheduled off days.      Patient was able to schedule appointment with therapist for 9/28/22 at 2 pm.      Patient wife has been driving , although, patient did  son from school, and able to keep mind off of anxiety while talking to son.  School is 5-10 minutes away from patient home.     Patient does suffer from neck pain which worsens with CPAP/BiPap mask despite trying to loosen strap.  Not able to tolerate.    Patient most impairing symptom is the depression and anxiety as patient believes \"insomnia is a product of the anxiety.\" Patient complaint of inability to calm mind down, \"I don't try to think about it, it just hits me.\"       8/9/22:  INITIAL EVAL  Patient presents today in office with a history of anxiety and depression.   Patient began treatment approximately 10-12 yrs ago with PCP's, has never seen a behavioral health provider.  Patient initial demeanor presents as anxious, somewhat guarded, and flat, however, as visit progressed patient was very open with current stressors and feelings.      Depression: Patient complains of depression. He complains of anhedonia, depressed mood, difficulty concentrating, fatigue, feelings of worthlessness/guilt and negative thoughts of feeling family would be better off without him, though denies actual suicidal thoughts. Patient admits to \"if I was gone everyone would be better off, I would never do it, I just think about it, fleeting throughout different parts of the day.\"  Patient reports while working it is easier, however, endorses to increased anxiety related to work.  \"a lot of the issues stem from work as well.\"  Denies action plan.  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  Patient was then given some acid reflex medications and had a cardiac workup which was negative.  Patient was then started on Prozac 20 mg 4/26/22.      Current manager is different than before, has had past problems with current female supervisor in 12/2020 while helping putting cylinders away, co-worker put in wrong bins but patient name was on the work, however, patient was punished and had told union, \" she had taken me out of the computer and she refused to put me back in the computer, had arguments prior to this incident,\" patient feels retaliation from this supervisor which has weighed on patient as supervisor now giving patient the \"cold shoulder\". And has difficulty with anxiety.  Patient reports past employer at [employer] did not have any problems, however, patient feels he does not share same  interests as fellow employees.  Patient has been at [employer] for 23 yrs, \"and I still feel like an outsider.\"     Patient admits to having feelings of chest pressure and minimal sweating to palms while driving, \"anytime I get an anxious or stressful situation almost,\" patient takes Hydroxyzine 25 mg and feels in zombie state, and 3-4 hrs later will " "take Buspar 10 mg which causes drowsiness, denies dizziness.  Patient believes anxiety symptoms while driving began after incident earlier this year with the forklift, March 2022.    Patient admits to only taking Buspar as needed, does not need or take while off work, and on weekends takes once if working as there is a \"lighter management\" .  Patient reports he is unable to take Prozac at exact same time with Buspar and hydroxyzine due to feelings of \"spacing out\".   Patient taking Prozac every morning which patient states, \"it makes me a little more social.  Not so much while driving to work, it's only 2 min.from my house. 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 \"High traffic area gets my chest going and I get anxious.\"          PHQ-9 Depression Screening  PHQ-9 Total Score: 15     Little interest or pleasure in doing things? 2-->more than half the days   Feeling down, depressed, or hopeless? 1-->several days   Trouble falling or staying asleep, or sleeping too much? 3-->nearly every day   Feeling tired or having little energy? 2-->more than half the days   Poor appetite or overeating? 3-->nearly every day   Feeling bad about yourself - or that you are a failure or have let yourself or your family down? 3-->nearly every day   Trouble concentrating on things, such as reading the newspaper or watching television? 0-->not at all   Moving or speaking so slowly that other people could have noticed? Or the opposite - being so fidgety or restless that you have been moving around a lot more than usual? 0-->not at all   Thoughts that you would be better off dead, or of hurting yourself in some way? 1-->several days   PHQ-9 Total Score 15     BEE-7  Feeling nervous, anxious or on edge: Nearly every day  Not being able to stop or control worrying: More than half the days  Worrying too much about different things: Nearly every day  Trouble Relaxing: " More than half the days  Being so restless that it is hard to sit still: Not at all  Feeling afraid as if something awful might happen: More than half the days  Becoming easily annoyed or irritable: More than half the days  BEE 7 Total Score: 14  If you checked any problems, how difficult have these problems made it for you to do your work, take care of things at home, or get along with other people: Somewhat difficult     Past Surgical History:  Past Surgical History:   Procedure Laterality Date   • LUMBAR DECOMPRESSION  03/09/2016    JE  L4-5 lumbar decomp disckectomy and TLIF  sextant mtrix and stealth   • LUMBAR FUSION Right 3/9/2016    Procedure: RT L4-5 LUMBAR DECOMPRESSION DISCECTOMY AND TLIF SEXTANT METRIX AND STEALTH;  Surgeon: Celestino Mackay MD;  Location: [facility] MAIN OR;  Service:    • PLANTAR FASCIA RELEASE Left 2/9/2021    Procedure: LEFT FOOT ENDOSCOPIC PLANTAR FASCIOTOMY;  Surgeon: Rafy Bauer DPM;  Location: [facility] MAIN OR;  Service: Podiatry;  Laterality: Left;   • SPINE SURGERY      lumbar spinal diskectomy L4/L5  L5/S1 2009   • WISDOM TOOTH EXTRACTION         Problem List:  Patient Active Problem List   Diagnosis   • Lumbosacral disc disease   • Work related injury   • Post-operative state   • Decreased sensation   • Low back pain   • Vitamin D deficiency   • Acquired hammer toe of left foot   • Cavus deformity of left foot   • Hyperkeratosis   • Pain in left foot   • Plantar fasciitis   • Dysthymia       Allergy:   No Known Allergies     Discontinued Medications:  Medications Discontinued During This Encounter   Medication Reason   • FLUoxetine (PROzac) 20 MG capsule Dose adjustment       Current Medications:   Current Outpatient Medications   Medication Sig Dispense Refill   • FLUoxetine (PROzac) 40 MG capsule Take 1 capsule by mouth Every Morning for 90 days. Indications: Generalized Anxiety Disorder, Major Depressive Disorder 30 capsule 2     No current facility-administered  medications for this visit.       Past Medical History:  Past Medical History:   Diagnosis Date   • Anxiety    • Back pain    • Depression    • Head injury    • Lumbar herniated disc    • Lumbosacral disc disease    • Obesity    • Obstructive sleep apnea     C-PAP mask not working/ not currently treating   • Panic disorder    • PTSD (post-traumatic stress disorder)    • Work related injury 10/30/2014    reinjured 8-13-15       Past Psychiatric History:  Began Treatment:approximately 10 or more years ago with  PCP at the time  Diagnoses:Depression, Anxiety and suspected PTSD  Psychiatrist:Denies  Therapist:2016 through workman's comp prior to back surgery  Admission History:Denies  Medication Trials:Xanax and Zoloft initally [id number] with -ineffective; 10-12 yrs ago placed on Prozac-ineffective Effexor XR 8/9-9/13/22 sweating,zoned out,abd bloating; Buspar 10 mg as needed drowsiness; Hydroxyzine-drowsiness with low dose of 10 mg, next day grogginess ; Lexapro-insomnia after 5 weeks, possible related to anxiety, though self stopped mid Oct.2022  Self Harm: Denies  Suicide Attempts:Denies      Substance Abuse History:   Types:Denies all, including illicit  Withdrawal Symptoms:Denies  Longest Period Sober:Not Applicable   AA: Not applicable     Social History:  Martial Status:  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, 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)  Kids:Yes or If so, how many 1 son age 15  House:Lives in a house   History: Denies  Access to Guns:  No    Social History     Socioeconomic History   • Marital status:    Tobacco Use   • Smoking status: Never   • Smokeless tobacco: Never   Vaping Use   • Vaping Use: Never used   Substance and Sexual Activity   • Alcohol use: No   • Drug use: Never   • Sexual activity: Yes       Family History:   Suicide Attempts: Denies  Suicide Completions:Denies      Family History   Problem Relation Age of Onset   • Diabetes Father         Developmental History:   Born: [place]  Siblings:1 brother  Childhood: physical,mental-by father  High School:Completed  College:Denies    Mental Status Exam:   Hygiene:   good  Cooperation:  Cooperative  Eye Contact:  Good  Psychomotor Behavior:  Appropriate  Affect:  Appropriate  Mood: depressed and anxious improving  Speech:  Normal  Thought Process:  Goal directed  Thought Content:  Mood congruent  Suicidal:  None  Homicidal:  None  Hallucinations:  None  Delusion:  None  Memory:  Intact  Orientation:  Person, Place, Time and Situation  Reliability:  good  Insight:  Good  Judgement:  Good  Impulse Control:  Good  Physical/Medical Issues:  Yes KAILASH-untreated due to claustraphobia with mask, L-Spine back pain     Review of Systems:  Review of Systems   Constitutional: Negative for diaphoresis and fatigue.   HENT: Negative for drooling.    Eyes: Negative for visual disturbance.   Respiratory: Positive for apnea. Negative for cough and shortness of breath.         KAILASH, unable to tolerate mask, snores loudly   Cardiovascular: Negative for chest pain, palpitations and leg swelling.   Gastrointestinal: Negative for nausea and vomiting.   Endocrine: Negative for cold intolerance and heat intolerance.   Genitourinary: Negative for difficulty urinating.   Musculoskeletal: Negative for joint swelling.   Allergic/Immunologic: Negative for immunocompromised state.   Neurological: Negative for dizziness, seizures, speech difficulty and numbness.   Psychiatric/Behavioral: Positive for sleep disturbance. Negative for decreased concentration, hallucinations, self-injury and suicidal ideas. The patient is nervous/anxious. The patient is not hyperactive.          Physical Exam:  Physical Exam  Psychiatric:         Attention and Perception: Attention and perception normal.         Mood and Affect: Affect normal. Mood is anxious and depressed.         Speech: Speech normal.         Behavior: Behavior normal. Behavior is  "cooperative.         Thought Content: Thought content normal. Thought content does not include suicidal ideation. Thought content does not include suicidal plan.         Cognition and Memory: Cognition and memory normal.         Judgment: Judgment normal.         Vital Signs:   /78   Pulse 86   Ht 175.3 cm (69\")   Wt 117 kg (258 lb 3.2 oz)   BMI 38.13 kg/m²      Lab Results:   No visits with results within 6 Month(s) from this visit.   Latest known visit with results is:   Lab on 02/06/2021   Component Date Value Ref Range Status   • COVID19 02/06/2021 Not Detected  Not Detected - Ref. Range Final       EKG Results:  No orders to display       Imaging Results:  No Images in the past 120 days found..      Assessment & Plan   Diagnoses and all orders for this visit:    1. Generalized anxiety disorder (Primary)  -     FLUoxetine (PROzac) 40 MG capsule; Take 1 capsule by mouth Every Morning for 90 days. Indications: Generalized Anxiety Disorder, Major Depressive Disorder  Dispense: 30 capsule; Refill: 2    2. Major depressive disorder, recurrent episode, moderate (HCC)  -     FLUoxetine (PROzac) 40 MG capsule; Take 1 capsule by mouth Every Morning for 90 days. Indications: Generalized Anxiety Disorder, Major Depressive Disorder  Dispense: 30 capsule; Refill: 2        Visit Diagnoses:    ICD-10-CM ICD-9-CM   1. Generalized anxiety disorder  F41.1 300.02   2. Major depressive disorder, recurrent episode, moderate (HCC)  F33.1 296.32       PLAN:  1. Safety: No acute safety concerns  2. Therapy: Currently Seeing a Therapist SHIRLEY Leslie  3. Risk Assessment: Risk of self-harm acutely is moderate  Risk factors include anxiety disorder, mood disorder,fleeting negative thoughts, and recent psychosocial stressors (pandemic). Protective factors include no family history, denies access to guns/weapons, no present SI, no history of suicide attempts or self-harm in the past, minimal AODA, healthcare seeking, " future orientation, willingness to engage in care.  Risk of self-harm chronically is also moderate, but could be further elevated in the event of treatment noncompliance and/or AODA.  4. Meds:  Increase Prozac (Fluoxetine) FROM 20 mg TO 40 mg by mouth daily in the morning to target anxiety and depression.  Discussed all risks, benefits, alternatives, and side effects of Fluoxetine including but not limited to GI upset, decreased appetite, sexual dysfunction, bleeding risk, seizure risk, insomnia, anxiety, drowsiness, headache, tremor, nervousness, activation of kena or hypomania, increased fragility fracture risk, hyponatremia, ocular effects, serotonin syndrome, hypersensitivity reaction, and activation of suicidal ideation and behavior. Patient educated on the need to practice safe sex while taking this medication. Discussed the need for patient to immediately call the office for any new or worsening symptoms, such as worsening depression; feeling nervous or restless; suicidal thoughts or actions; or other changes in mood or behavior, and all other concerns. Patient educated on medication compliance.  Patient verbalized understanding and is agreeable to taking Fluoxetine. Addressed all questions and concerns. Patient instructed to start taking 2 of the 20 mg caps on hand until supply depleted, which should be in approximately 1 week.  New prescription sent in for 40 mg cap.   5. Labs:  N/a      Symptoms of anxiety and depression are improving with Prozac, however, continues to have symptoms therefore, will increase dose of Prozac.  Patient to contact provider if symptoms worsen or fail to improve.     10/18/22:   Therapy: Currently Seeing a Therapist SHIRLEY Leslie  Start Prozac (Fluoxetine) 20 mg by mouth daily in the morning to target anxiety and depression.  Discussed all risks, benefits, alternatives, and side effects of Fluoxetine including but not limited to GI upset, decreased appetite, sexual  "dysfunction, bleeding risk, seizure risk, insomnia, anxiety, drowsiness, headache, tremor, nervousness, activation of kena or hypomania, increased fragility fracture risk, hyponatremia, ocular effects, serotonin syndrome, hypersensitivity reaction, and activation of suicidal ideation and behavior. Patient educated on the need to practice safe sex while taking this medication. Discussed the need for patient to immediately call the office for any new or worsening symptoms, such as worsening depression; feeling nervous or restless; suicidal thoughts or actions; or other changes in mood or behavior, and all other concerns. Patient educated on medication compliance.  Patient verbalized understanding and is agreeable to taking Fluoxetine. Addressed all questions and concerns.     Patient willing to try Prozac again as while taking 10-12 yrs ago was taking with Hydroxyzine and believes that combination made him feel worse.  Suspect Lexapro needed to be a higher dose as patient self stopped approximately 1 week ago due to inability to go to sleep easily, and symptoms subsided for the most part since stopping Lexapro. Symptoms of depression and anxiety remain present and agreeable to try another medication. Unable to tolerate Hydroxyzine at lower dose due to sedation.   Patient to contact provider if symptoms worsen or fail to improve.     9/13/22:   Therapy referral previously.  Appointment scheduled with Elyssa Curtis FT 9/28/22 at 2 pm. Informed front office staff at [place] office to update referral.   Stop Effexor XR (Venlafaxine XR) due to sweating, abdominal bloating, and feeling \"zoned out.\" patient stopped after nearly 2 weeks.     Advised patient to try to take Hydroxyzine 10 mg by mouth nightly as needed to target insomnia associated with anxiety.      START Escitalopram (LEXAPRO) 5 mg by mouth daily for 7 days, then increase to 10 mg by mouth daily to target depression, anxiety.  Risks, benefits, " "alternatives discussed with patient including GI upset, nausea vomiting diarrhea, theoretical decrease of seizure threshold predisposing the patient to a slightly higher seizure risk, headaches, sexual dysfunction, serotonin syndrome, bleeding risk.  After discussion of these risks and benefits, the patient voiced understanding and agreed to proceed.    Patient unable to tolerate Effexor XR, has failed Prozac, Zoloft in the past, and as needed dose of 10 mg Buspar caused drowsiness.  Will try Lexapro, and avoid potential \"activating\" medications.  Patient wishes to proceed with treatment for anxiety and depression as these symptoms are contributing to insomnia.  Patient will start therapy in a few weeks which will provide benefit for patient overall plan of care of goal to resolve symptoms of anxiety and depression.  Will see patient back in office in 5 weeks, Patient to contact provider if symptoms worsen or fail to improve.       8/9/22:   Start Effexor XR (Venlafaxine XR) 37.5 mg by mouth daily in the morning  to target depression and anxiety.  Will start slow and not increase at week 2 due to patient reported sensitivity with medications.  Risks, benefits, alternatives discussed with patient including anorexia, constipation, dizziness, dry mouth, nausea, nervousness, somnolence, sweating, sexual side effects, headache and insomnia. After discussion of these risks and benefits, the patient voiced understanding and agreed to proceed.      Change Hydroxyzine from 25 mg by mouth 3 times daily as needed TO 10 mg by mouth every 6 hrs by mouth as needed to target anxiety. Risks, benefits, alternatives discussed with patient including sedation, dizziness, fall risk, GI upset, and risk of increased CNS depression and elevated heart rate if taken with other antihistamines.  After discussion of these risks and benefits, the patient voiced understanding and agreed to proceed.      Referral to Elyssa Curtis " [facility]  Address: [address] [facility] [address], [place] [address], Phone:  [phone] Patient to contact provider and set up appointment.  And to contact office if unable to get an appointment scheduled.   MR authorization form signed to give [facility] consent to verify appointment of initial evaluation with Elyssa Curtis.  Form sent to provider via secure email site sendinc per provider request.       Patient presentation seems most consistent with depression and anxiety which is predominately related to current work environment, possible social anxiety, and anxiety with physical symptoms while driving which began after incident at current employer in March 2022.   Patient agreeable to switch from Prozac to Effexor XR, lowered Hydroxyzine dose due to drowsiness with 25 mg dose.  Patient blood pressure slightly elevated today which patient contributes to anxiety about appointment, prior blood pressure's 120's/70-80's.  Plan to explore PTSD symptoms and social anxiety in future visits, Patient to contact provider if symptoms worsen or fail to improve.  Will have patient return in 5 weeks.     Patient screened positive for depression based on a PHQ-9 score of 15 on 1/31/2023. Follow-up recommendations include: Prescribed antidepressant medication treatment and Suicide Risk Assessment performed.       TREATMENT PLAN/GOALS: Continue supportive psychotherapy efforts and medications as indicated. Treatment and medication options discussed during today's visit. Patient acknowledged and verbally consented to continue with current treatment plan and was educated on the importance of compliance with treatment and follow-up appointments.    MEDICATION ISSUES:  JAYCEE reviewed as expected.  MANUAL JAYCEE 1/31/23 AT 0853 #[id number].  Discussed medication options and treatment plan of prescribed medication as well as the risks, benefits, and side effects including potential falls, possible impaired driving and metabolic  adversities among others. Patient is agreeable to call the office with any worsening of symptoms or onset of side effects. Patient is agreeable to call 911 or go to the nearest ER should he/she begin having SI/HI. No medication side effects or related complaints today.     MEDS ORDERED DURING VISIT:  New Medications Ordered This Visit   Medications   • FLUoxetine (PROzac) 40 MG capsule     Sig: Take 1 capsule by mouth Every Morning for 90 days. Indications: Generalized Anxiety Disorder, Major Depressive Disorder     Dispense:  30 capsule     Refill:  2       Return in about 7 weeks (around 3/21/2023) for medication check.         I spent 30 minutes caring for Pranav on this date of service. This time includes time spent by me in the following activities: preparing for the visit, performing a medically appropriate examination and/or evaluation, counseling and educating the patient/family/caregiver, ordering medications, tests, or procedures, referring and communicating with other health care professionals, documenting information in the medical record and care coordination.      This document has been electronically signed by CHINO Mcclain  January 31, 2023 16:35 EST      Part of this note may be an electronic transcription/translation of spoken language to printed text using the Dragon Dictation System.

## 2023-01-31 NOTE — PATIENT INSTRUCTIONS
"1.  Please return to clinic at your next scheduled visit.  Contact the [facility] ([phone]) or **Meri, Medical Assistant at [facility] directly at [phone] at least 24 hours prior in the event you need to cancel.**    2. Should you want to get in touch with your provider, CHINO Mcclain, please contact MY Medical Assistant, Meri, directly at [phone].  Recommend saving Meri's direct number in phone as this is the PREFERRED & EASIEST way to get in contact with your provider.  Please leave a voice mail if you do not get an answer and she will return your call within 24 hrs. You will NOT be able to contact provider on [facility], as Behavioral Health Providers are restricted. YOU MUST CALL [phone]    If you need to speak with the on call provider after hours or on weekends, please Contact the [facility] ([phone]) and staff will be able to page the provider on call directly.        3, MEDICATION REFILLS:  PLEASE CALL THE PHARMACY TO REQUEST ALL MEDICATION REFILLS TO ENSURE YOU ARE RECEIVING YOUR MEDICATIONS IN A TIMELY MANNER.    IF YOU USE AN AUTOMATED SERVICE AT THE PHARMACY FOR REFILLS AND ARE TOLD THERE ARE \"NO REFILLS REMAINING\"   PLEASE CALL THE PHARMACY & SPEAK TO A LIVE PERSON TO VERIFY IT IS THE MOST UP TO DATE PRESCRIPTION ON FILE.    All new prescriptions will have a different number, therefore, if you were given refills for a medication today or at last visit it will not have the same number as the previous prescription.       4.  In the event you have personal crisis, contact the following crisis numbers: Suicide Prevention Hotline 1-370.852.6820 or *988, RABIA Helpline 8-592-939-RABIA; [facility] Room [phone]; text HELLO to 943407; or 027.      5. We would appreciate your feedback, please scan the QRS code on the back of your appointment card (or see below) and complete a brief survey.  [facility] location is still not available, so " "please click \"[facility]\" location.  Thank you      SPECIFIC RECOMMENDATIONS:     1.      Medications discussed at this encounter:                   - Increase Prozac from 20 to 40 mg by mouth daily in the morning, start taking 2 of the 20 mg caps you have on hand, New prescription sent for the 40 mg cap.      2.      Psychotherapy recommendations: Continue with current therapist.      3.     Return to clinic: 7 weeks    Please arrive at least 15 minutes before your scheduled appointment time to complete check in process.      IF you are scheduled for a [facility] visit, PLEASE ANSWER YOUR PHONE WHEN OFFICE CALLS PRIOR TO VISIT TO COMPLETE THE CHECK IN PROCESS, EVEN IF THE E-CHECK IN WAS COMPLETED.     If you would like to log on to [facility] and complete the \"E-Check IN\" prior to your visit, please do so, this will speed up the check in process.  If you are due for questionnaires, you will find those on [facility] as well, please try to complete prior to your scheduled appointment.          "

## 2023-02-13 RX ORDER — FLUOXETINE HYDROCHLORIDE 20 MG/1
CAPSULE ORAL
Qty: 30 CAPSULE | OUTPATIENT
Start: 2023-02-13

## 2023-02-13 NOTE — TELEPHONE ENCOUNTER
[facility] Protocol Passed 02/12/2023 01:50 PM   Protocol Details  Blood pressure on record in past 15 months    PHQ2 or PHQ9 on record in past 12 months    Recent or future visit with authorizing provider        NEXT APPT WITH PROVIDER  Appointment with Archana Macario APRN (03/21/2023)    LAST MED REFILL  FLUoxetine (PROzac) 40 MG capsule (01/31/2023)    QTY 30 WITH 2 REFILLS     TOO SOON FOR A REFILL/REFUSED     PROVIDER PLEASE ADVISE

## 2023-03-21 ENCOUNTER — OFFICE VISIT (OUTPATIENT)
Dept: BEHAVIORAL HEALTH | Facility: CLINIC | Age: 48
End: 2023-03-21
Payer: COMMERCIAL

## 2023-03-21 VITALS
HEIGHT: 69 IN | DIASTOLIC BLOOD PRESSURE: 86 MMHG | SYSTOLIC BLOOD PRESSURE: 124 MMHG | WEIGHT: 264 LBS | HEART RATE: 104 BPM | BODY MASS INDEX: 39.1 KG/M2

## 2023-03-21 DIAGNOSIS — F41.1 GENERALIZED ANXIETY DISORDER: Primary | ICD-10-CM

## 2023-03-21 DIAGNOSIS — F33.0 MILD EPISODE OF RECURRENT MAJOR DEPRESSIVE DISORDER: ICD-10-CM

## 2023-03-21 PROCEDURE — 99212 OFFICE O/P EST SF 10 MIN: CPT | Performed by: NURSE PRACTITIONER

## 2023-03-21 NOTE — PROGRESS NOTES
"Answers for HPI/ROS submitted by the patient on 3/14/2023  What is the primary reason for your visit?: Other  Please describe your symptoms.: Depression, anxiety,  ptsd  Have you had these symptoms before?: Yes  How long have you been having these symptoms?: Greater than 2 weeks  Please list any medications you are currently taking for this condition.: Fluoxetine 40mg    Subjective   Pranav Calderon is a 47 y.o. male who presents today for follow up    Referring Provider:  Fauzia Ballard MD  [id number] [facility]  [address]  [place] [address]    Chief Complaint:  Depression and anxiety    History of Present Illness:    3/21/23:  Patient presents today in office, at last visit Prozac was increased from 20 to 40 mg for which patient reports \"I am doing well. I don't think my anxiety will go away completely, but it is definitely better, I don't dwell on it.\" Patient reports overall Prozac has been very effective and is pleased with results.  Sleep has improved due to decreased anxiety, able to calm mind.      Patient had an appointment in early 1/2023 with therapy which was cancelled by provider and has not received a call to reschedule, which patient has not attempted to reschedule, though plans to do so today.      Patient has felt anxiety prior to going into work, though quickly subsides, and patient feels may be due to the idea of just going into work.  Overall mood has improved in regards to depression.     1/31/23:  Patient presents today in office \"I feel like the problems are still there, but I am dealing with them better, I am not stressing about it as much at home.  Started Prozac at last visit, for which patient feels has helped improve mood.  Patient explains in Nov 2022 had some financial concerns, and was able to refinance both vehicles which lowered monthly payments, and \"I felt like if I didn't have the Prozac I wouldn't have been able to do that.\"      Patient denies SI, though at times has " "thoughts \"if I was gone they would be happier financially, because of monthly child support.\"  Son is 16 currently. Denies feelings of harming self or others, denies rumination of thoughts or action plan.      Patient takes Prozac later in morning at 11 am-12 noon due to working 2nd shift, though at times will not go to sleep until 3 am.  Patient had difficulty with sleep prior to starting Prozac as patient has KAILASH and is unable to wear the mask.  Recently bought a mouth guard to help with snoring.  Denies gasping for air, though wife says patient has in the past. \"I do snore really loud.\"    Patient continues to see therapist, which is going well.     Depression: Patient complains of depression. He complains of anhedonia, depressed mood, fatigue, feelings of worthlessness/guilt and insomnia    Anxiety:  The patient endorses significant symptoms of anxiety including: excessive anxiety and worry about a number of events or activities for more days than not, being easily fatigued, difficulty concentrating or mind going blank, irritability and sleep disturbance which have caused impairment in important areas of daily functioning.        10/18/22:  Patient presents today in office reporting tolerating Lexapro initially, though having difficulty falling asleep, and approximately 1 week ago stopped taking Lexapro and noticed immediately those symptoms resolved.  As now patient is not having difficulty going to sleep.  Patient tried to take lower dose of Hydroxyzine 10 mg and continued to feel groggy the following day.     Patient reports started therapy with Elyssa and has upcoming appointment 10/25/22.     Patient reports switched shifts from 8a-4pm to 4 pm-12 midnight, which started 2 weeks ago, continues to have chest pain with anxiety while driving, sleep has improved, however, depression rates 6 our of 10, and anxiety 8or9 /10.  \"This hasn't been the best year.\"  Difficulty's with employer, finances, son, life " "stressors.     Patient further explains he was having difficulty sleeping while taking Lexapro at lower dose, though open to possible reason related to anxiety.   Recalls while taking Prozac also took Hydroxyzine at night which suspect feelings of grogginess, \"feeling so crappy was a combination of the two together.\"     9/13/22:  Patient presents today in office reporting  \"Effexor not working for me. Sweating a lot and feel zoned out\" and felt bloated, patient was started on 37.5 mg of Effexor at last appointment and decreased as needed Hydroxyzine from 25 to 10 mg by mouth every 6 hr as needed.  Patient reports stopped taking Effexor XR after almost 2 weeks due to adverse side effects.  Once patient stopped Effexor XR the symptoms resolved.     Patient tried lower dose of Hydroxyzine of 10 mg after stopping Effexor XR for 2 days and continued to feel tired, though not able to get to sleep, patient took off from work to help \"destress and difficulty sleeping.\" Patient has been off for approximately 2 weeks including scheduled off days.      Patient was able to schedule appointment with therapist for 9/28/22 at 2 pm.      Patient wife has been driving , although, patient did  son from school, and able to keep mind off of anxiety while talking to son.  School is 5-10 minutes away from patient home.     Patient does suffer from neck pain which worsens with CPAP/BiPap mask despite trying to loosen strap.  Not able to tolerate.    Patient most impairing symptom is the depression and anxiety as patient believes \"insomnia is a product of the anxiety.\" Patient complaint of inability to calm mind down, \"I don't try to think about it, it just hits me.\"       8/9/22:  INITIAL EVAL  Patient presents today in office with a history of anxiety and depression.   Patient began treatment approximately 10-12 yrs ago with PCP's, has never seen a behavioral health provider.  Patient initial demeanor presents as anxious, " "somewhat guarded, and flat, however, as visit progressed patient was very open with current stressors and feelings.      Depression: Patient complains of depression. He complains of anhedonia, depressed mood, difficulty concentrating, fatigue, feelings of worthlessness/guilt and negative thoughts of feeling family would be better off without him, though denies actual suicidal thoughts. Patient admits to \"if I was gone everyone would be better off, I would never do it, I just think about it, fleeting throughout different parts of the day.\"  Patient reports while working it is easier, however, endorses to increased anxiety related to work.  \"a lot of the issues stem from work as well.\"  Denies action plan.  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  Patient was then given some acid reflex medications and had a cardiac workup which was negative.  Patient was then started on Prozac 20 mg 4/26/22.      Current manager is different than before, has had past problems with current female supervisor in 12/2020 while helping putting cylinders away, co-worker put in wrong bins but patient name was on the work, however, patient was punished and had " "told union, \" she had taken me out of the computer and she refused to put me back in the computer, had arguments prior to this incident,\" patient feels retaliation from this supervisor which has weighed on patient as supervisor now giving patient the \"cold shoulder\". And has difficulty with anxiety.  Patient reports past employer at [employer] did not have any problems, however, patient feels he does not share same  interests as fellow employees.  Patient has been at [employer] for 23 yrs, \"and I still feel like an outsider.\"     Patient admits to having feelings of chest pressure and minimal sweating to palms while driving, \"anytime I get an anxious or stressful situation almost,\" patient takes Hydroxyzine 25 mg and feels in zombie state, and 3-4 hrs later will take Buspar 10 mg which causes drowsiness, denies dizziness.  Patient believes anxiety symptoms while driving began after incident earlier this year with the [employer], March 2022.    Patient admits to only taking Buspar as needed, does not need or take while off work, and on weekends takes once if working as there is a \"lighter management\" .  Patient reports he is unable to take Prozac at exact same time with Buspar and hydroxyzine due to feelings of \"spacing out\".   Patient taking Prozac every morning which patient states, \"it makes me a little more social.  Not so much while driving to work, it's only 2 min.from my house. 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 \"High traffic area gets my chest going and I get anxious.\"          PHQ-9 Depression Screening  PHQ-9 Total Score:   1/31/2023 15 , reassess 06/2023    Little interest or pleasure in doing things?     Feeling down, depressed, or hopeless?     Trouble falling or staying asleep, or sleeping too much?     Feeling tired or having little energy?     Poor appetite or overeating?     Feeling bad about yourself - or " that you are a failure or have let yourself or your family down?     Trouble concentrating on things, such as reading the newspaper or watching television?     Moving or speaking so slowly that other people could have noticed? Or the opposite - being so fidgety or restless that you have been moving around a lot more than usual?     Thoughts that you would be better off dead, or of hurting yourself in some way?     PHQ-9 Total Score       BEE-7    1/31/2023 14 , reassess 06/2023    Past Surgical History:  Past Surgical History:   Procedure Laterality Date   • LUMBAR DECOMPRESSION  03/09/2016    [facility]  L4-5 lumbar decomp disckectomy and TLIF  sextant mtrix and stealth   • LUMBAR FUSION Right 3/9/2016    Procedure: RT L4-5 LUMBAR DECOMPRESSION DISCECTOMY AND TLIF SEXTANT METRIX AND STEALTH;  Surgeon: Celestino Mackay MD;  Location: [facility] MAIN OR;  Service:    • PLANTAR FASCIA RELEASE Left 2/9/2021    Procedure: LEFT FOOT ENDOSCOPIC PLANTAR FASCIOTOMY;  Surgeon: Rafy Bauer DPM;  [place] [facility] [place];  Service: Podiatry;  Laterality: Left;   • SPINE SURGERY      lumbar spinal diskectomy L4/L5  L5/S1 2009   • WISDOM TOOTH EXTRACTION         Problem List:  Patient Active Problem List   Diagnosis   • Lumbosacral disc disease   • Work related injury   • Post-operative state   • Decreased sensation   • Low back pain   • Vitamin D deficiency   • Acquired hammer toe of left foot   • Cavus deformity of left foot   • Hyperkeratosis   • Pain in left foot   • Plantar fasciitis   • Dysthymia       Allergy:   No Known Allergies     Discontinued Medications:  There are no discontinued medications.    Current Medications:   Current Outpatient Medications   Medication Sig Dispense Refill   • FLUoxetine (PROzac) 40 MG capsule Take 1 capsule by mouth Every Morning for 90 days. Indications: Generalized Anxiety Disorder, Major Depressive Disorder 30 capsule 2     No current facility-administered medications for this visit.        Past Medical History:  Past Medical History:   Diagnosis Date   • Anxiety    • Back pain    • Depression    • Head injury    • Lumbar herniated disc    • Lumbosacral disc disease    • Obesity    • Obstructive sleep apnea     C-PAP mask not working/ not currently treating   • Panic disorder    • PTSD (post-traumatic stress disorder)    • Work related injury 10/30/2014    reinjured 8-13-15       Past Psychiatric History:  Began Treatment:approximately 10 or more years ago with  PCP at the time  Diagnoses:Depression, Anxiety and suspected PTSD  Psychiatrist:Denies  Therapist:2016 through [employer] comp prior to back surgery  Admission History:Denies  Medication Trials:Xanax and Zoloft initally 5539-0729 with -ineffective; 10-12 yrs ago placed on Prozac-ineffective; Effexor XR 8/9-9/13/22 sweating,zoned out,abd bloating; Buspar 10 mg as needed drowsiness; Hydroxyzine-drowsiness with low dose of 10 mg, next day grogginess ; Lexapro-insomnia after 5 weeks, possible related to anxiety, though self stopped mid Oct.2022   Self Harm: Denies  Suicide Attempts:Denies      Substance Abuse History:   Types:Denies all, including illicit  Withdrawal Symptoms:Denies  Longest Period Sober:Not Applicable   AA: Not applicable     Social History:  Martial Status:  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, 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)  Kids:Yes or If so, how many 1 son age 16  House:Lives in a house   History: Denies  Access to Guns:  No    Social History     Socioeconomic History   • Marital status:    Tobacco Use   • Smoking status: Never   • Smokeless tobacco: Never   Vaping Use   • Vaping Use: Never used   Substance and Sexual Activity   • Alcohol use: No   • Drug use: Never   • Sexual activity: Yes       Family History:   Suicide Attempts: Denies  Suicide Completions:Denies      Family History   Problem Relation Age of Onset   • Diabetes Father        Developmental  History:   Born: [place]  Siblings:1 brother  Childhood: physical,mental-by father  High School:Completed  College:Denies    Mental Status Exam:   Hygiene:   good  Cooperation:  Cooperative  Eye Contact:  Good  Psychomotor Behavior:  Appropriate  Affect:  Appropriate  Mood: euthymic   Speech:  Normal  Thought Process:  Goal directed  Thought Content:  Mood congruent  Suicidal:  None  Homicidal:  None  Hallucinations:  None  Delusion:  None  Memory:  Intact  Orientation:  Person, Place, Time and Situation  Reliability:  good  Insight:  Good  Judgement:  Good  Impulse Control:  Good  Physical/Medical Issues:  Yes KAILASH-untreated due to claustraphobia with mask, L-Spine back pain     Review of Systems:  Review of Systems   Constitutional: Negative for diaphoresis and fatigue.   HENT: Negative for drooling.    Eyes: Negative for visual disturbance.   Respiratory: Positive for apnea. Negative for cough and shortness of breath.         KAILASH, unable to tolerate mask, snores loudly   Cardiovascular: Negative for chest pain, palpitations and leg swelling.   Gastrointestinal: Negative for nausea and vomiting.   Endocrine: Negative for cold intolerance and heat intolerance.   Genitourinary: Negative for difficulty urinating.   Musculoskeletal: Negative for joint swelling.   Allergic/Immunologic: Negative for immunocompromised state.   Neurological: Negative for dizziness, seizures, speech difficulty and numbness.   Psychiatric/Behavioral: Negative for decreased concentration, hallucinations, self-injury, sleep disturbance and suicidal ideas. The patient is nervous/anxious. The patient is not hyperactive.          Physical Exam:  Physical Exam  Psychiatric:         Attention and Perception: Attention and perception normal.         Mood and Affect: Mood and affect normal.         Speech: Speech normal.         Behavior: Behavior normal. Behavior is cooperative.         Thought Content: Thought content normal. Thought content does  "not include suicidal ideation. Thought content does not include suicidal plan.         Cognition and Memory: Cognition and memory normal.         Judgment: Judgment normal.         Vital Signs:   /86   Pulse 104   Ht 175.3 cm (69\")   Wt 120 kg (264 lb)   BMI 38.99 kg/m²      Lab Results:   No visits with results within 6 Month(s) from this visit.   Latest known visit with results is:   Lab on 02/06/2021   Component Date Value Ref Range Status   • COVID19 02/06/2021 Not Detected  Not Detected - Ref. Range Final       EKG Results:  No orders to display       Imaging Results:  No Images in the past 120 days found..      Assessment & Plan   Diagnoses and all orders for this visit:    1. Generalized anxiety disorder (Primary)    2. Mild episode of recurrent major depressive disorder (HCC)        Visit Diagnoses:    ICD-10-CM ICD-9-CM   1. Generalized anxiety disorder  F41.1 300.02   2. Mild episode of recurrent major depressive disorder (HCC)  F33.0 296.31       PLAN:  1. Safety: No acute safety concerns  2. Therapy: Currently Seeing a Therapist SHIRLEY Leslie, advised patient to reach out to reschedule appointment as provider had to cancel last appointment.   3. Risk Assessment: Risk of self-harm acutely is moderate  Risk factors include anxiety disorder, mood disorder,fleeting negative thoughts, and recent psychosocial stressors (pandemic). Protective factors include no family history, denies access to guns/weapons, no present SI, no history of suicide attempts or self-harm in the past, minimal AODA, healthcare seeking, future orientation, willingness to engage in care.  Risk of self-harm chronically is also moderate, but could be further elevated in the event of treatment noncompliance and/or AODA.  4. Meds:  Continue Prozac (Fluoxetine) 40 mg by mouth daily in the morning to target anxiety and depression.  Discussed all risks, benefits, alternatives, and side effects of Fluoxetine including but " not limited to GI upset, decreased appetite, sexual dysfunction, bleeding risk, seizure risk, insomnia, anxiety, drowsiness, headache, tremor, nervousness, activation of kena or hypomania, increased fragility fracture risk, hyponatremia, ocular effects, serotonin syndrome, hypersensitivity reaction, and activation of suicidal ideation and behavior. Patient educated on the need to practice safe sex while taking this medication. Discussed the need for patient to immediately call the office for any new or worsening symptoms, such as worsening depression; feeling nervous or restless; suicidal thoughts or actions; or other changes in mood or behavior, and all other concerns. Patient educated on medication compliance.  Patient verbalized understanding and is agreeable to taking Fluoxetine. Addressed all questions and concerns.  NR needed today, will plan to reorder with a 90 day supply when refill is needed.   5. Labs:  N/a    Symptoms of anxiety and depression are under good control with Prozac. Patient to contact provider if symptoms worsen or fail to improve.       1/31/23:   Therapy: Currently Seeing a Therapist SHIRLEY Leslie    Increase Prozac (Fluoxetine) FROM 20 mg TO 40 mg by mouth daily in the morning to target anxiety and depression.   Patient instructed to start taking 2 of the 20 mg caps on hand until supply depleted, which should be in approximately 1 week.  New prescription sent in for 40 mg cap.     Symptoms of anxiety and depression are improving with Prozac, however, continues to have symptoms therefore, will increase dose of Prozac.  Patient to contact provider if symptoms worsen or fail to improve.     10/18/22:   Therapy: Currently Seeing a Therapist SHIRLEY Leslie  Start Prozac (Fluoxetine) 20 mg by mouth daily in the morning to target anxiety and depression.  Discussed all risks, benefits, alternatives, and side effects of Fluoxetine including but not limited to GI upset,  "decreased appetite, sexual dysfunction, bleeding risk, seizure risk, insomnia, anxiety, drowsiness, headache, tremor, nervousness, activation of kena or hypomania, increased fragility fracture risk, hyponatremia, ocular effects, serotonin syndrome, hypersensitivity reaction, and activation of suicidal ideation and behavior. Patient educated on the need to practice safe sex while taking this medication. Discussed the need for patient to immediately call the office for any new or worsening symptoms, such as worsening depression; feeling nervous or restless; suicidal thoughts or actions; or other changes in mood or behavior, and all other concerns. Patient educated on medication compliance.  Patient verbalized understanding and is agreeable to taking Fluoxetine. Addressed all questions and concerns.     Patient willing to try Prozac again as while taking 10-12 yrs ago was taking with Hydroxyzine and believes that combination made him feel worse.  Suspect Lexapro needed to be a higher dose as patient self stopped approximately 1 week ago due to inability to go to sleep easily, and symptoms subsided for the most part since stopping Lexapro. Symptoms of depression and anxiety remain present and agreeable to try another medication. Unable to tolerate Hydroxyzine at lower dose due to sedation.   Patient to contact provider if symptoms worsen or fail to improve.     9/13/22:   Therapy referral previously.  Appointment scheduled with Elyssa WATSON 9/28/22 at 2 pm. Informed front office staff at [place] office to update referral.   Stop Effexor XR (Venlafaxine XR) due to sweating, abdominal bloating, and feeling \"zoned out.\" patient stopped after nearly 2 weeks.     Advised patient to try to take Hydroxyzine 10 mg by mouth nightly as needed to target insomnia associated with anxiety.      START Escitalopram (LEXAPRO) 5 mg by mouth daily for 7 days, then increase to 10 mg by mouth daily to target depression, " "anxiety.  Risks, benefits, alternatives discussed with patient including GI upset, nausea vomiting diarrhea, theoretical decrease of seizure threshold predisposing the patient to a slightly higher seizure risk, headaches, sexual dysfunction, serotonin syndrome, bleeding risk.  After discussion of these risks and benefits, the patient voiced understanding and agreed to proceed.    Patient unable to tolerate Effexor XR, has failed Prozac, Zoloft in the past, and as needed dose of 10 mg Buspar caused drowsiness.  Will try Lexapro, and avoid potential \"activating\" medications.  Patient wishes to proceed with treatment for anxiety and depression as these symptoms are contributing to insomnia.  Patient will start therapy in a few weeks which will provide benefit for patient overall plan of care of goal to resolve symptoms of anxiety and depression.  Will see patient back in office in 5 weeks, Patient to contact provider if symptoms worsen or fail to improve.       8/9/22:   Start Effexor XR (Venlafaxine XR) 37.5 mg by mouth daily in the morning  to target depression and anxiety.  Will start slow and not increase at week 2 due to patient reported sensitivity with medications.  Risks, benefits, alternatives discussed with patient including anorexia, constipation, dizziness, dry mouth, nausea, nervousness, somnolence, sweating, sexual side effects, headache and insomnia. After discussion of these risks and benefits, the patient voiced understanding and agreed to proceed.      Change Hydroxyzine from 25 mg by mouth 3 times daily as needed TO 10 mg by mouth every 6 hrs by mouth as needed to target anxiety. Risks, benefits, alternatives discussed with patient including sedation, dizziness, fall risk, GI upset, and risk of increased CNS depression and elevated heart rate if taken with other antihistamines.  After discussion of these risks and benefits, the patient voiced understanding and agreed to proceed.      Referral to " Elyssa Curtis [facility]  Address: [address], [place] [address], Phone:  [phone] Patient to contact provider and set up appointment.  And to contact office if unable to get an appointment scheduled.   MR authorization form signed to give [facility] consent to verify appointment of initial evaluation with Elyssa Curtis.  Form sent to provider via secure email site sendinc per provider request.       Patient presentation seems most consistent with depression and anxiety which is predominately related to current work environment, possible social anxiety, and anxiety with physical symptoms while driving which began after incident at current employer in March 2022.   Patient agreeable to switch from Prozac to Effexor XR, lowered Hydroxyzine dose due to drowsiness with 25 mg dose.  Patient blood pressure slightly elevated today which patient contributes to anxiety about appointment, prior blood pressure's 120's/70-80's.  Plan to explore PTSD symptoms and social anxiety in future visits, Patient to contact provider if symptoms worsen or fail to improve.  Will have patient return in 5 weeks.     Patient screened positive for depression based on a PHQ-9 score of 15 on 1/31/2023. Follow-up recommendations include: Prescribed antidepressant medication treatment and Suicide Risk Assessment performed.       TREATMENT PLAN/GOALS: Continue supportive psychotherapy efforts and medications as indicated. Treatment and medication options discussed during today's visit. Patient acknowledged and verbally consented to continue with current treatment plan and was educated on the importance of compliance with treatment and follow-up appointments.    MEDICATION ISSUES:  JAYCEE reviewed as expected.    Discussed medication options and treatment plan of prescribed medication as well as the risks, benefits, and side effects including potential falls, possible impaired driving and metabolic adversities among  others. Patient is agreeable to call the office with any worsening of symptoms or onset of side effects. Patient is agreeable to call 911 or go to the nearest ER should he/she begin having SI/HI. No medication side effects or related complaints today.     MEDS ORDERED DURING VISIT:  No orders of the defined types were placed in this encounter.      Return in about 3 months (around 6/21/2023) for medication check.         I spent 15 minutes caring for Pranav on this date of service. This time includes time spent by me in the following activities: preparing for the visit, performing a medically appropriate examination and/or evaluation, counseling and educating the patient/family/caregiver, referring and communicating with other health care professionals, documenting information in the medical record and care coordination.      This document has been electronically signed by CHINO Mcclain  March 21, 2023 11:48 EDT      Part of this note may be an electronic transcription/translation of spoken language to printed text using the Dragon Dictation System.

## 2023-03-21 NOTE — PATIENT INSTRUCTIONS
"1.  Please return to clinic at your next scheduled visit.  Contact the [facility] ([phone]) or **Meri, Medical Assistant at [place] Office directly at [phone] at least 24 hours prior in the event you need to cancel.**    2. Should you want to get in touch with your provider, CHINO Mcclain, please contact MY Medical Assistant, Meri, directly at [phone].  Recommend saving Meri's direct number in phone as this is the PREFERRED & EASIEST way to get in contact with your provider.  Please leave a voice mail if you do not get an answer and she will return your call within 24 hrs. You will NOT be able to contact provider on [facility], as Behavioral Health Providers are restricted. YOU MUST CALL [phone]    If you need to speak with the on call provider after hours or on weekends, please Contact the [facility] ([phone]) and staff will be able to page the provider on call directly.        3, MEDICATION REFILLS:  PLEASE CALL THE PHARMACY TO REQUEST ALL MEDICATION REFILLS TO ENSURE YOU ARE RECEIVING YOUR MEDICATIONS IN A TIMELY MANNER.    IF YOU USE AN AUTOMATED SERVICE AT THE PHARMACY FOR REFILLS AND ARE TOLD THERE ARE \"NO REFILLS REMAINING\"   PLEASE CALL THE PHARMACY & SPEAK TO A LIVE PERSON TO VERIFY IT IS THE MOST UP TO DATE PRESCRIPTION ON FILE.    All new prescriptions will have a different number, therefore, if you were given refills for a medication today or at last visit it will not have the same number as the previous prescription.       4.  In the event you have personal crisis, contact the following crisis numbers: Suicide Prevention Hotline 1-713.834.8462 or *988, RABIA Helpline 0-840-427-RABIA; [facility] Emergency Room [phone]; text HELLO to [phone]; or 719.      5. We would appreciate your feedback, please scan the QRS code on the back of your appointment card (or see below) and complete a brief survey.  [place] location is still not available, so " "please click \"[place]\" location.  Thank you      SPECIFIC RECOMMENDATIONS:     1.      Medications discussed at this encounter:                   - continue Prozac 40 mg daily in the morning      2.      Psychotherapy recommendations: Continue with current therapist.  Call to reschedule      3.     Return to clinic: 3 months    Please arrive at least 15 minutes before your scheduled appointment time to complete check in process.      IF you are scheduled for a [facility] V[facility] visit, PLEASE ANSWER YOUR PHONE WHEN OFFICE CALLS PRIOR TO VISIT TO COMPLETE THE CHECK IN PROCESS, EVEN IF THE E-CHECK IN WAS COMPLETED.     If you would like to log on to [facility] and complete the \"E-Check IN\" prior to your visit, please do so, this will speed up the check in process.  If you are due for questionnaires, you will find those on [facility] as well, please try to complete prior to your scheduled appointment.          "

## 2023-03-27 ENCOUNTER — OFFICE VISIT (OUTPATIENT)
Dept: FAMILY MEDICINE CLINIC | Age: 48
End: 2023-03-27
Payer: COMMERCIAL

## 2023-03-27 VITALS
HEART RATE: 84 BPM | OXYGEN SATURATION: 98 % | HEIGHT: 69 IN | SYSTOLIC BLOOD PRESSURE: 136 MMHG | WEIGHT: 261.4 LBS | DIASTOLIC BLOOD PRESSURE: 87 MMHG | TEMPERATURE: 97.7 F | BODY MASS INDEX: 38.72 KG/M2

## 2023-03-27 DIAGNOSIS — J30.1 SEASONAL ALLERGIC RHINITIS DUE TO POLLEN: Primary | ICD-10-CM

## 2023-03-27 PROCEDURE — 99213 OFFICE O/P EST LOW 20 MIN: CPT | Performed by: NURSE PRACTITIONER

## 2023-03-27 RX ORDER — LEVOCETIRIZINE DIHYDROCHLORIDE 5 MG/1
5 TABLET, FILM COATED ORAL EVERY EVENING
Qty: 90 TABLET | Refills: 3 | Status: SHIPPED | OUTPATIENT
Start: 2023-03-27

## 2023-03-27 RX ORDER — FLUTICASONE PROPIONATE 50 MCG
1 SPRAY, SUSPENSION (ML) NASAL DAILY
Qty: 16 G | Refills: 3 | Status: SHIPPED | OUTPATIENT
Start: 2023-03-27

## 2023-05-03 DIAGNOSIS — F33.1 MAJOR DEPRESSIVE DISORDER, RECURRENT EPISODE, MODERATE: ICD-10-CM

## 2023-05-03 DIAGNOSIS — F41.1 GENERALIZED ANXIETY DISORDER: ICD-10-CM

## 2023-05-03 RX ORDER — FLUOXETINE HYDROCHLORIDE 40 MG/1
CAPSULE ORAL
Qty: 90 CAPSULE | Refills: 0 | Status: SHIPPED | OUTPATIENT
Start: 2023-05-03

## 2023-05-03 NOTE — TELEPHONE ENCOUNTER
PT REQUESTING REFILL ON PROZAC 40 MG CAPSULES.     PT NEXT FOLLOW UP APPT IS 6/20/23.    FLUoxetine (PROzac) 40 MG capsule (01/31/2023)    ORDER PENDING; PLEASE REVIEW.

## 2023-07-25 ENCOUNTER — LAB (OUTPATIENT)
Dept: LAB | Facility: HOSPITAL | Age: 48
End: 2023-07-25
Payer: COMMERCIAL

## 2023-07-25 ENCOUNTER — OFFICE VISIT (OUTPATIENT)
Dept: FAMILY MEDICINE CLINIC | Age: 48
End: 2023-07-25
Payer: COMMERCIAL

## 2023-07-25 VITALS
HEIGHT: 69 IN | WEIGHT: 248.8 LBS | OXYGEN SATURATION: 97 % | HEART RATE: 81 BPM | DIASTOLIC BLOOD PRESSURE: 76 MMHG | BODY MASS INDEX: 36.85 KG/M2 | SYSTOLIC BLOOD PRESSURE: 137 MMHG

## 2023-07-25 DIAGNOSIS — E66.09 CLASS 2 OBESITY DUE TO EXCESS CALORIES WITHOUT SERIOUS COMORBIDITY WITH BODY MASS INDEX (BMI) OF 36.0 TO 36.9 IN ADULT: ICD-10-CM

## 2023-07-25 DIAGNOSIS — G47.33 OBSTRUCTIVE SLEEP APNEA: ICD-10-CM

## 2023-07-25 DIAGNOSIS — F32.4 MAJOR DEPRESSIVE DISORDER WITH SINGLE EPISODE, IN PARTIAL REMISSION: ICD-10-CM

## 2023-07-25 DIAGNOSIS — M51.9 LUMBOSACRAL DISC DISEASE: ICD-10-CM

## 2023-07-25 DIAGNOSIS — Z13.6 ENCOUNTER FOR SCREENING FOR CARDIOVASCULAR DISORDERS: ICD-10-CM

## 2023-07-25 DIAGNOSIS — Z12.5 PROSTATE CANCER SCREENING: ICD-10-CM

## 2023-07-25 DIAGNOSIS — E55.9 VITAMIN D DEFICIENCY: ICD-10-CM

## 2023-07-25 DIAGNOSIS — Z11.59 ENCOUNTER FOR SCREENING FOR OTHER VIRAL DISEASES: ICD-10-CM

## 2023-07-25 DIAGNOSIS — J30.1 SEASONAL ALLERGIC RHINITIS DUE TO POLLEN: ICD-10-CM

## 2023-07-25 DIAGNOSIS — L29.9 PRURITUS: ICD-10-CM

## 2023-07-25 DIAGNOSIS — Z23 ENCOUNTER FOR IMMUNIZATION: ICD-10-CM

## 2023-07-25 DIAGNOSIS — Z12.11 COLON CANCER SCREENING: ICD-10-CM

## 2023-07-25 DIAGNOSIS — Z00.00 ANNUAL PHYSICAL EXAM: Primary | ICD-10-CM

## 2023-07-25 DIAGNOSIS — F41.9 ANXIETY: ICD-10-CM

## 2023-07-25 PROBLEM — E66.812 CLASS 2 OBESITY DUE TO EXCESS CALORIES WITHOUT SERIOUS COMORBIDITY WITH BODY MASS INDEX (BMI) OF 36.0 TO 36.9 IN ADULT: Status: ACTIVE | Noted: 2023-07-25

## 2023-07-25 LAB
ALBUMIN SERPL-MCNC: 4.4 G/DL (ref 3.5–5.2)
ALBUMIN/GLOB SERPL: 1.6 G/DL
ALP SERPL-CCNC: 46 U/L (ref 39–117)
ALT SERPL W P-5'-P-CCNC: 48 U/L (ref 1–41)
ANION GAP SERPL CALCULATED.3IONS-SCNC: 9 MMOL/L (ref 5–15)
AST SERPL-CCNC: 36 U/L (ref 1–40)
BILIRUB SERPL-MCNC: 0.4 MG/DL (ref 0–1.2)
BUN SERPL-MCNC: 15 MG/DL (ref 6–20)
BUN/CREAT SERPL: 14.3 (ref 7–25)
CALCIUM SPEC-SCNC: 9.6 MG/DL (ref 8.6–10.5)
CHLORIDE SERPL-SCNC: 103 MMOL/L (ref 98–107)
CHOLEST SERPL-MCNC: 170 MG/DL (ref 0–200)
CO2 SERPL-SCNC: 25 MMOL/L (ref 22–29)
CREAT SERPL-MCNC: 1.05 MG/DL (ref 0.76–1.27)
DEPRECATED RDW RBC AUTO: 43.4 FL (ref 37–54)
EGFRCR SERPLBLD CKD-EPI 2021: 88.7 ML/MIN/1.73
ERYTHROCYTE [DISTWIDTH] IN BLOOD BY AUTOMATED COUNT: 12.1 % (ref 12.3–15.4)
GLOBULIN UR ELPH-MCNC: 2.8 GM/DL
GLUCOSE SERPL-MCNC: 96 MG/DL (ref 65–99)
HBA1C MFR BLD: 5.5 % (ref 4.8–5.6)
HCT VFR BLD AUTO: 48.4 % (ref 37.5–51)
HDLC SERPL-MCNC: 56 MG/DL (ref 40–60)
HGB BLD-MCNC: 15.8 G/DL (ref 13–17.7)
LDLC SERPL CALC-MCNC: 98 MG/DL (ref 0–100)
LDLC/HDLC SERPL: 1.73 {RATIO}
MCH RBC QN AUTO: 31.2 PG (ref 26.6–33)
MCHC RBC AUTO-ENTMCNC: 32.6 G/DL (ref 31.5–35.7)
MCV RBC AUTO: 95.7 FL (ref 79–97)
PLATELET # BLD AUTO: 203 10*3/MM3 (ref 140–450)
PMV BLD AUTO: 10.8 FL (ref 6–12)
POTASSIUM SERPL-SCNC: 4.3 MMOL/L (ref 3.5–5.2)
PROT SERPL-MCNC: 7.2 G/DL (ref 6–8.5)
PSA SERPL-MCNC: 0.48 NG/ML (ref 0–4)
RBC # BLD AUTO: 5.06 10*6/MM3 (ref 4.14–5.8)
SODIUM SERPL-SCNC: 137 MMOL/L (ref 136–145)
T4 FREE SERPL-MCNC: 1.2 NG/DL (ref 0.93–1.7)
TRIGL SERPL-MCNC: 86 MG/DL (ref 0–150)
TSH SERPL DL<=0.05 MIU/L-ACNC: 1.87 UIU/ML (ref 0.27–4.2)
VLDLC SERPL-MCNC: 16 MG/DL (ref 5–40)
WBC NRBC COR # BLD: 6.08 10*3/MM3 (ref 3.4–10.8)

## 2023-07-25 PROCEDURE — 85027 COMPLETE CBC AUTOMATED: CPT

## 2023-07-25 PROCEDURE — 80061 LIPID PANEL: CPT

## 2023-07-25 PROCEDURE — 86803 HEPATITIS C AB TEST: CPT

## 2023-07-25 PROCEDURE — 36415 COLL VENOUS BLD VENIPUNCTURE: CPT

## 2023-07-25 PROCEDURE — 90471 IMMUNIZATION ADMIN: CPT | Performed by: FAMILY MEDICINE

## 2023-07-25 PROCEDURE — 84439 ASSAY OF FREE THYROXINE: CPT

## 2023-07-25 PROCEDURE — G0103 PSA SCREENING: HCPCS

## 2023-07-25 PROCEDURE — 99396 PREV VISIT EST AGE 40-64: CPT | Performed by: FAMILY MEDICINE

## 2023-07-25 PROCEDURE — 84443 ASSAY THYROID STIM HORMONE: CPT

## 2023-07-25 PROCEDURE — 82306 VITAMIN D 25 HYDROXY: CPT

## 2023-07-25 PROCEDURE — 83036 HEMOGLOBIN GLYCOSYLATED A1C: CPT

## 2023-07-25 PROCEDURE — 99214 OFFICE O/P EST MOD 30 MIN: CPT | Performed by: FAMILY MEDICINE

## 2023-07-25 PROCEDURE — 80053 COMPREHEN METABOLIC PANEL: CPT

## 2023-07-25 PROCEDURE — 90715 TDAP VACCINE 7 YRS/> IM: CPT | Performed by: FAMILY MEDICINE

## 2023-07-25 RX ORDER — FEXOFENADINE HCL 180 MG/1
180 TABLET ORAL DAILY
Qty: 90 TABLET | Refills: 0 | Status: SHIPPED | OUTPATIENT
Start: 2023-07-25

## 2023-07-25 NOTE — PROGRESS NOTES
Pranav Calderon presents to [facility].    Chief Complaint: yearly physical    Subjective     History of Present Illness:  HPI  Patient is in today for yearly physical.  Last colonoscopy:  due now  Last PSA:  never  Urinary symptoms: none  Nocturia:none  Sexual concerns:  decreased libido and difficulty maintaining erection onset prior to prozac use  EtOH use: None  Tobacco use: None  He wears a seatbelt in the car  Drug use: None  Last eye exam: last year-wears glasses  Dental exams every 6 months  IMMUNIZATIONS:  TDAP  DIET: low carb  EXERCISE: occasional  HEARING: yes-recc testing (our testing machine is down today so he will have to follow up  DEPRESSION: STABLE AND WELL CONTROLLED ON PROZAC    He complains of worsening allergies, flonase helps, tried zyrtec and it made him sleepy, flonase is not as effective as it was, feels thick phglem in back of his throat, occas cough    He has sleep apnea and doesn't tolerate cpap, defers f/u with sleep specialist, recc wt loss and routine exercise    Chronic depression is stable on prozac, no acute issues, doesn't sleep well due to sleep apnea.  Denies SI/HI    He also is complaining of itching underneath the testicles bilaterally.  No pain          Result Review   The following data was reviewed by Fauzia Ballard MD on 07/25/2023.  Lab Results   Component Value Date    WBC 6.08 07/25/2023    HGB 15.8 07/25/2023    HCT 48.4 07/25/2023    MCV 95.7 07/25/2023     07/25/2023     Lab Results   Component Value Date    GLUCOSE 91 08/05/2020    BUN 11 08/05/2020    CREATININE 0.96 08/05/2020    BCR 11 08/05/2020    K 4.6 08/05/2020    CO2 22 08/05/2020    CALCIUM 10.1 08/05/2020    ALBUMIN 4.2 08/05/2020    BILITOT 0.43 08/05/2020    AST 51 (H) 08/05/2020    ALT 70 (H) 08/05/2020     Lab Results   Component Value Date    CHLPL 172 08/05/2020    TRIG 125 08/05/2020    HDL 52 08/05/2020    LDL 95 08/05/2020     Lab Results   Component  Value Date    TSH 2.010 08/05/2020     No results found for: HGBA1C  No results found for: PSA      No results found for: [id number]            Assessment and Plan:   Diagnoses and all orders for this visit:    1. Annual physical exam (Primary)    2. Encounter for immunization  Comments:  Recommend COVID booster and he defers  Orders:  -     Tdap Vaccine => 6yo IM (BOOSTRIX)    3. Colon cancer screening  Comments:  We will set him up for his first screening colonoscopy  Orders:  -     Ambulatory Referral For Screening Colonoscopy    4. Prostate cancer screening  Comments:  We will go ahead and check a screening PSA  Orders:  -     PSA Screen; Future    5. Encounter for screening for other viral diseases  Comments:  Hepatitis C lab place  Orders:  -     Hepatitis C antibody; Future    6. Encounter for screening for cardiovascular disorders  Comments:  We will screen for cardiovascular disorders  Orders:  -     Comprehensive Metabolic Panel; Future  -     CBC (No Diff); Future  -     Lipid Panel; Future  -     Hemoglobin A1c; Future    7. Vitamin D deficiency  Comments:  He is not on vitamin D supplementation but has history of vitamin D deficiency.  We will check labs  Orders:  -     Vitamin D,25-Hydroxy; Future    8. Lumbosacral disc disease  Comments:  Chronic lower back pain that is stable and manageable at this time.  No changes in current treatment plan    9. Anxiety  Comments:  recently increased prozac and he is doing well and he feels like his anxiety is stable and under good control as well as his depression    10. Major depressive disorder with single episode, in partial remission  Comments:  recently increased prozac and he is doing well and he feels like his anxiety is stable and under good control as well as his depression    11. Seasonal allergic rhinitis due to pollen  Comments:  Worsening allergy symptoms.  Did not tolerate Zyrtec.  Already on Flonase.  We will add Allegra  Orders:  -     fexofenadine  "(Allegra Allergy) 180 MG tablet; Take 1 tablet by mouth Daily.  Dispense: 90 tablet; Refill: 0    12. Class 2 obesity due to excess calories without serious comorbidity with body mass index (BMI) of 36.0 to 36.9 in adult  Comments:  seen nutritionist in past last year, defers re referral.  [facility] routine exercis 30 min 5 days a week  Orders:  -     TSH+Free T4; Future    13. Pruritus  Comments:  Low testes.  Skin exam was normal.  Recommend he try topical Lotrimin twice daily x2 weeks and see if symptoms resolve    14. Obstructive sleep apnea  Comments:  He does not tolerate CPAP and his not a candidate for inspire.  Recommend he continue healthy diet daily exercise and weight loss to help improve sleep apnea              Objective     Medications:  Current Outpatient Medications   Medication Instructions    fexofenadine (ALLEGRA ALLERGY) 180 mg, Oral, Daily    FLUoxetine (PROZAC) 60 mg, Oral, Every Morning    fluticasone (FLONASE) 50 MCG/ACT nasal spray 1 spray, Nasal, Daily        Vital Signs:   /76 (BP Location: Right arm, Patient Position: Sitting)   Pulse 81   Ht 175.3 cm (69.02\")   Wt 113 kg (248 lb 12.8 oz)   SpO2 97%   BMI 36.72 kg/m²     Class 2 Severe Obesity (BMI >=35 and <=39.9). Obesity-related health conditions include the following: obstructive sleep apnea. Obesity is improving with lifestyle modifications. BMI is is above average; BMI management plan is completed. We discussed portion control and increasing exercise.       Physical Exam:  Physical Exam  Vitals and nursing note reviewed.   Constitutional:       General: He is not in acute distress.     Appearance: Normal appearance. He is well-developed and well-groomed. He is not ill-appearing.   HENT:      Head: Normocephalic and atraumatic.      Right Ear: Tympanic membrane, ear canal and external ear normal.      Left Ear: Tympanic membrane, ear canal and external ear normal.      Nose: Nose normal. No congestion or rhinorrhea.      " Mouth/Throat:      Mouth: Mucous membranes are moist.   Eyes:      Extraocular Movements: Extraocular movements intact.      Pupils: Pupils are equal, round, and reactive to light.   Cardiovascular:      Rate and Rhythm: Normal rate and regular rhythm.      Pulses: Normal pulses.      Heart sounds: Normal heart sounds. No murmur heard.    No friction rub. No gallop.   Pulmonary:      Effort: Pulmonary effort is normal. No respiratory distress.      Breath sounds: Normal breath sounds. No wheezing, rhonchi or rales.   Abdominal:      General: Bowel sounds are normal. There is no distension.      Palpations: There is no mass.      Tenderness: There is no abdominal tenderness. There is no guarding.      Hernia: No hernia is present. There is no hernia in the left inguinal area or right inguinal area.   Genitourinary:     Penis: Uncircumcised.       Testes:         Right: Mass, tenderness, swelling, testicular hydrocele or varicocele not present. Right testis is descended. Cremasteric reflex is present.          Left: Mass, tenderness, swelling, testicular hydrocele or varicocele not present. Left testis is descended. Cremasteric reflex is present.       Epididymis:      Right: Normal.      Left: Normal.      Comments: Right testes is raised and left testes is distended  Musculoskeletal:         General: No swelling or tenderness. Normal range of motion.      Cervical back: Normal range of motion and neck supple. No rigidity or tenderness.   Lymphadenopathy:      Lower Body: No right inguinal adenopathy. No left inguinal adenopathy.   Skin:     General: Skin is warm.      Capillary Refill: Capillary refill takes less than 2 seconds.      Findings: No rash.          Neurological:      General: No focal deficit present.      Mental Status: He is alert and oriented to person, place, and time.      Cranial Nerves: No cranial nerve deficit.      Sensory: No sensory deficit.      Motor: No weakness.      Gait: Gait normal.       Deep Tendon Reflexes: Reflexes normal.   Psychiatric:         Mood and Affect: Mood normal.         Behavior: Behavior normal.         Thought Content: Thought content normal.         Judgment: Judgment normal.       Review of Systems:  Review of Systems   Constitutional:  Negative for chills, fatigue and fever.   HENT:  Negative for congestion, ear discharge and sore throat.    Respiratory:  Negative for shortness of breath.    Cardiovascular:  Negative for chest pain.   Gastrointestinal:  Negative for abdominal pain, constipation, diarrhea, nausea, vomiting and GERD.   Genitourinary:  Negative for flank pain.   Musculoskeletal:  Positive for back pain.   Skin:  Negative for rash.   Neurological:  Negative for dizziness and headache.   Psychiatric/Behavioral:  Positive for depressed mood. Negative for sleep disturbance and suicidal ideas. The patient is nervous/anxious.             Follow Up   Return in about 6 months (around 1/25/2024) for Recheck.           Medical History:  There are no discontinued medications.   Past Medical History:    Anxiety    Back pain    Depression    Head injury    Lumbar herniated disc    Lumbosacral disc disease    Obesity    Obstructive sleep apnea    C-PAP mask not working/ not currently treating    Panic disorder    PTSD (post-traumatic stress disorder)    Work related injury    reinjured 8-13-15     Past Surgical History:    LUMBAR DECOMPRESSION    JE  L4-5 lumbar decomp disckectomy and TLIF  sextant mtrix and stealth    LUMBAR FUSION    Procedure: RT L4-5 LUMBAR DECOMPRESSION DISCECTOMY AND TLIF SEXTANT METRIX AND STEALTH;  Surgeon: Celestino Mackay MD;  Location: [facility] MAIN OR;  Service:     PLANTAR FASCIA RELEASE    Procedure: LEFT FOOT ENDOSCOPIC PLANTAR FASCIOTOMY;  Surgeon: Rafy Bauer DPM;  [place] [facility] [place];  Service: Podiatry;  Laterality: Left;    SPINE SURGERY    lumbar spinal diskectomy L4/L5  L5/S1 2009    WISDOM TOOTH EXTRACTION      Family History    Problem Relation Age of Onset    Diabetes Father      Social History     Tobacco Use    Smoking status: Never    Smokeless tobacco: Never   Substance Use Topics    Alcohol use: No       Health Maintenance Due   Topic Date Due    COLORECTAL CANCER SCREENING  Never done    HEPATITIS C SCREENING  Never done        Immunization History   Administered Date(s) Administered    COVID-19 (PFIZER) Purple Cap Monovalent 04/02/2021, 04/28/2021    Flu Vaccine Intradermal Quad 18-64YR 10/19/2021    Influenza Injectable Mdck Pf Quad 10/19/2021, 10/25/2022    Influenza, Unspecified 10/25/2022    Tdap 07/25/2023       No Known Allergies

## 2023-07-25 NOTE — PATIENT INSTRUCTIONS
Calorie Counting for Weight Loss  Calories are units of energy. Your body needs a certain number of calories from food to keep going throughout the day. When you eat or drink more calories than your body needs, your body stores the extra calories mostly as fat. When you eat or drink fewer calories than your body needs, your body burns fat to get the energy it needs.  Calorie counting means keeping track of how many calories you eat and drink each day. Calorie counting can be helpful if you need to lose weight. If you eat fewer calories than your body needs, you should lose weight. Ask your health care provider what a healthy weight is for you.  For calorie counting to work, you will need to eat the right number of calories each day to lose a healthy amount of weight per week. A dietitian can help you figure out how many calories you need in a day and will suggest ways to reach your calorie goal.  A healthy amount of weight to lose each week is usually 1-2 lb (0.5-0.9 kg). This usually means that your daily calorie intake should be reduced by 500-750 calories.  Eating 1,200-1,500 calories a day can help most women lose weight.  Eating 1,500-1,800 calories a day can help most men lose weight.  What do I need to know about calorie counting?  Work with your health care provider or dietitian to determine how many calories you should get each day. To meet your daily calorie goal, you will need to:  Find out how many calories are in each food that you would like to eat. Try to do this before you eat.  Decide how much of the food you plan to eat.  Keep a food log. Do this by writing down what you ate and how many calories it had.  To successfully lose weight, it is important to balance calorie counting with a healthy lifestyle that includes regular activity.  Where do I find calorie information?    The number of calories in a food can be found on a Nutrition Facts label. If a food does not have a Nutrition Facts label, try  to look up the calories online or ask your dietitian for help.  Remember that calories are listed per serving. If you choose to have more than one serving of a food, you will have to multiply the calories per serving by the number of servings you plan to eat. For example, the label on a package of bread might say that a serving size is 1 slice and that there are 90 calories in a serving. If you eat 1 slice, you will have eaten 90 calories. If you eat 2 slices, you will have eaten 180 calories.  How do I keep a food log?  After each time that you eat, record the following in your food log as soon as possible:  What you ate. Be sure to include toppings, sauces, and other extras on the food.  How much you ate. This can be measured in cups, ounces, or number of items.  How many calories were in each food and drink.  The total number of calories in the food you ate.  Keep your food log near you, such as in a pocket-sized notebook or on an roxanna or website on your mobile phone. Some programs will calculate calories for you and show you how many calories you have left to meet your daily goal.  What are some portion-control tips?  Know how many calories are in a serving. This will help you know how many servings you can have of a certain food.  Use a measuring cup to measure serving sizes. You could also try weighing out portions on a kitchen scale. With time, you will be able to estimate serving sizes for some foods.  Take time to put servings of different foods on your favorite plates or in your favorite bowls and cups so you know what a serving looks like.  Try not to eat straight from a food's packaging, such as from a bag or box. Eating straight from the package makes it hard to see how much you are eating and can lead to overeating. Put the amount you would like to eat in a cup or on a plate to make sure you are eating the right portion.  Use smaller plates, glasses, and bowls for smaller portions and to prevent  overeating.  Try not to multitask. For example, avoid watching TV or using your computer while eating. If it is time to eat, sit down at a table and enjoy your food. This will help you recognize when you are full. It will also help you be more mindful of what and how much you are eating.  What are tips for following this plan?  Reading food labels  Check the calorie count compared with the serving size. The serving size may be smaller than what you are used to eating.  Check the source of the calories. Try to choose foods that are high in protein, fiber, and vitamins, and low in saturated fat, trans fat, and sodium.  Shopping  Read nutrition labels while you shop. This will help you make healthy decisions about which foods to buy.  Pay attention to nutrition labels for low-fat or fat-free foods. These foods sometimes have the same number of calories or more calories than the full-fat versions. They also often have added sugar, starch, or salt to make up for flavor that was removed with the fat.  Make a grocery list of lower-calorie foods and stick to it.  Cooking  Try to cook your favorite foods in a healthier way. For example, try baking instead of frying.  Use low-fat dairy products.  Meal planning  Use more fruits and vegetables. One-half of your plate should be fruits and vegetables.  Include lean proteins, such as chicken, turkey, and fish.  Lifestyle  Each week, aim to do one of the followin minutes of moderate exercise, such as walking.  75 minutes of vigorous exercise, such as running.  General information  Know how many calories are in the foods you eat most often. This will help you calculate calorie counts faster.  Find a way of tracking calories that works for you. Get creative. Try different apps or programs if writing down calories does not work for you.  What foods should I eat?    Eat nutritious foods. It is better to have a nutritious, high-calorie food, such as an avocado, than a food with  few nutrients, such as a bag of potato chips.  Use your calories on foods and drinks that will fill you up and will not leave you hungry soon after eating.  Examples of foods that fill you up are nuts and nut butters, vegetables, lean proteins, and high-fiber foods such as whole grains. High-fiber foods are foods with more than 5 g of fiber per serving.  Pay attention to calories in drinks. Low-calorie drinks include water and unsweetened drinks.  The items listed above may not be a complete list of foods and beverages you can eat. Contact a dietitian for more information.  What foods should I limit?  Limit foods or drinks that are not good sources of vitamins, minerals, or protein or that are high in unhealthy fats. These include:  Candy.  Other sweets.  Sodas, specialty coffee drinks, alcohol, and juice.  The items listed above may not be a complete list of foods and beverages you should avoid. Contact a dietitian for more information.  How do I count calories when eating out?  Pay attention to portions. Often, portions are much larger when eating out. Try these tips to keep portions smaller:  Consider sharing a meal instead of getting your own.  If you get your own meal, eat only half of it. Before you start eating, ask for a container and put half of your meal into it.  When available, consider ordering smaller portions from the menu instead of full portions.  Pay attention to your food and drink choices. Knowing the way food is cooked and what is included with the meal can help you eat fewer calories.  If calories are listed on the menu, choose the lower-calorie options.  Choose dishes that include vegetables, fruits, whole grains, low-fat dairy products, and lean proteins.  Choose items that are boiled, broiled, grilled, or steamed. Avoid items that are buttered, battered, fried, or served with cream sauce. Items labeled as crispy are usually fried, unless stated otherwise.  Choose water, low-fat milk,  unsweetened iced tea, or other drinks without added sugar. If you want an alcoholic beverage, choose a lower-calorie option, such as a glass of wine or light beer.  Ask for dressings, sauces, and syrups on the side. These are usually high in calories, so you should limit the amount you eat.  If you want a salad, choose a garden salad and ask for grilled meats. Avoid extra toppings such as parish, cheese, or fried items. Ask for the dressing on the side, or ask for olive oil and vinegar or lemon to use as dressing.  Estimate how many servings of a food you are given. Knowing serving sizes will help you be aware of how much food you are eating at restaurants.  Where to find more information  Centers for Disease Control and Prevention: www.cdc.gov  [place] Department of Agriculture: myplate.gov  Summary  Calorie counting means keeping track of how many calories you eat and drink each day. If you eat fewer calories than your body needs, you should lose weight.  A healthy amount of weight to lose per week is usually 1-2 lb (0.5-0.9 kg). This usually means reducing your daily calorie intake by 500-750 calories.  The number of calories in a food can be found on a Nutrition Facts label. If a food does not have a Nutrition Facts label, try to look up the calories online or ask your dietitian for help.  Use smaller plates, glasses, and bowls for smaller portions and to prevent overeating.  Use your calories on foods and drinks that will fill you up and not leave you hungry shortly after a meal.  This information is not intended to replace advice given to you by your health care provider. Make sure you discuss any questions you have with your health care provider.  Document Revised: 01/28/2021 Document Reviewed: 01/28/2021  [employer] Patient Education © 2023 [employer]

## 2023-07-26 LAB
25(OH)D3 SERPL-MCNC: 29 NG/ML (ref 30–100)
HCV AB SER DONR QL: NORMAL

## 2023-08-02 RX ORDER — FLUOXETINE HYDROCHLORIDE 40 MG/1
CAPSULE ORAL
Qty: 90 CAPSULE | OUTPATIENT
Start: 2023-08-02

## 2023-08-28 ENCOUNTER — OFFICE VISIT (OUTPATIENT)
Dept: BEHAVIORAL HEALTH | Facility: CLINIC | Age: 48
End: 2023-08-28
Payer: COMMERCIAL

## 2023-08-28 VITALS
HEIGHT: 69 IN | SYSTOLIC BLOOD PRESSURE: 108 MMHG | DIASTOLIC BLOOD PRESSURE: 64 MMHG | HEART RATE: 90 BPM | BODY MASS INDEX: 36.82 KG/M2 | WEIGHT: 248.6 LBS

## 2023-08-28 DIAGNOSIS — F33.0 MILD EPISODE OF RECURRENT MAJOR DEPRESSIVE DISORDER: ICD-10-CM

## 2023-08-28 DIAGNOSIS — F41.1 GENERALIZED ANXIETY DISORDER: Primary | ICD-10-CM

## 2023-08-28 NOTE — PROGRESS NOTES
Subjective   Pranav Calderon is a 48 y.o. male who presents today for follow up    Referring Provider:  No referring provider defined for this encounter.    Chief Complaint:  Depression, anxiety    Answers submitted by the patient for this visit:  Primary Reason for Visit (Submitted on 8/21/2023)  What is the primary reason for your visit?: Other  Other (Submitted on 8/21/2023)  Please describe your symptoms.: Anxiety (causing chest pain) PTSD Depression  Have you had these symptoms before?: Yes  How long have you been having these symptoms?: Greater than 2 weeks  Please list any medications you are currently taking for this condition.: Fluoxetine 60mg  Please describe any probable cause for these symptoms. : Work, stress, sleep apnea      History of Present Illness:    8/28/23:  Patient presents today in office, at last visit Prozac was increased from 40 to 60 mg and was advised to take later in the day due to patient working 2nd shift and had reported feeling the medication was corbin off and not lasting 12 hrs. Reports improvement in mood, has been taking 40 mg of old supply at 11 am and takes 20 mg while on break at work between 530-6 pm.  Denies side effects, tolerating well.    Patient had some increased stress regarding finances as truck needed repairs. Work is better.  Feels he is able to handle stress at home better than the past.    Patient has scheduled an appointment with therapist 9/13/23.       6/20/23:    Answers submitted by the patient for this visit:  Primary Reason for Visit (Submitted on 6/13/2023)  What is the primary reason for your visit?: Other  Other (Submitted on 6/13/2023)  Please describe your symptoms.: Anxiety  depression  PTSD  Have you had these symptoms before?: Yes  How long have you been having these symptoms?: Greater than 2 weeks  Please list any medications you are currently taking for this condition.: Fluoxetine 40  Please describe any probable cause for these symptoms. :  "Work, childhood, stress    Patient presents today in office reports having days Prozac is not working due to symptoms of lack of focus, chest discomfort due to anxiety. Though symptoms are not as frequent as with 20 mg Prozac.  Has days of being more \"anti-social\" and good days, and will compensate low energy levels with caffeine intake.  \"My brain makes it feel like a bad day, nothing out of the ordinary making it a bad day.\"  Patient works 4p-12 midnight, and takes dose upon awakening at 9-10 am, and around 10-11 pm feels anxiety starts. Since starting Prozac feels needs a nap in the afternoon, though uncertain as patient started 2nd shift 10/2022 as well as Prozac vs KAILASH which is untreated.     Patient was advised to schedule appointment with therapist at last visit, however, has not heard back since leaving a message.     Depression: Patient complains of depression. He complains of anhedonia, depressed mood, difficulty concentrating, fatigue, feelings of worthlessness/guilt, and insomnia  Anxiety:  The patient endorses significant symptoms of anxiety including: excessive anxiety and worry about a number of events or activities for more days than not, restlessness or feeling keyed up, being easily fatigued, difficulty concentrating or mind going blank, irritability, and sleep disturbance which have caused impairment in important areas of daily functioning.      3/21/23:    Answers for HPI/ROS submitted by the patient on 3/14/2023  What is the primary reason for your visit?: Other  Please describe your symptoms.: Depression, anxiety,  ptsd  Have you had these symptoms before?: Yes  How long have you been having these symptoms?: Greater than 2 weeks  Please list any medications you are currently taking for this condition.: Fluoxetine 40mg  Patient presents today in office, at last visit Prozac was increased from 20 to 40 mg for which patient reports \"I am doing well. I don't think my anxiety will go away " "completely, but it is definitely better, I don't dwell on it.\" Patient reports overall Prozac has been very effective and is pleased with results.  Sleep has improved due to decreased anxiety, able to calm mind.      Patient had an appointment in early 1/2023 with therapy which was cancelled by provider and has not received a call to reschedule, which patient has not attempted to reschedule, though plans to do so today.      Patient has felt anxiety prior to going into work, though quickly subsides, and patient feels may be due to the idea of just going into work.  Overall mood has improved in regards to depression.     1/31/23:  Patient presents today in office \"I feel like the problems are still there, but I am dealing with them better, I am not stressing about it as much at home.  Started Prozac at last visit, for which patient feels has helped improve mood.  Patient explains in Nov 2022 had some financial concerns, and was able to refinance both vehicles which lowered monthly payments, and \"I felt like if I didn't have the Prozac I wouldn't have been able to do that.\"      Patient denies SI, though at times has thoughts \"if I was gone they would be happier financially, because of monthly child support.\"  Son is 16 currently. Denies feelings of harming self or others, denies rumination of thoughts or action plan.      Patient takes Prozac later in morning at 11 am-12 noon due to working 2nd shift, though at times will not go to sleep until 3 am.  Patient had difficulty with sleep prior to starting Prozac as patient has KAILASH and is unable to wear the mask.  Recently bought a mouth guard to help with snoring.  Denies gasping for air, though wife says patient has in the past. \"I do snore really loud.\"    Patient continues to see therapist, which is going well.     Depression: Patient complains of depression. He complains of anhedonia, depressed mood, fatigue, feelings of worthlessness/guilt and insomnia    Anxiety:  " "The patient endorses significant symptoms of anxiety including: excessive anxiety and worry about a number of events or activities for more days than not, being easily fatigued, difficulty concentrating or mind going blank, irritability and sleep disturbance which have caused impairment in important areas of daily functioning.        10/18/22:  Patient presents today in office reporting tolerating Lexapro initially, though having difficulty falling asleep, and approximately 1 week ago stopped taking Lexapro and noticed immediately those symptoms resolved.  As now patient is not having difficulty going to sleep.  Patient tried to take lower dose of Hydroxyzine 10 mg and continued to feel groggy the following day.     Patient reports started therapy with Elyssa and has upcoming appointment 10/25/22.     Patient reports switched shifts from 8a-4pm to 4 pm-12 midnight, which started 2 weeks ago, continues to have chest pain with anxiety while driving, sleep has improved, however, depression rates 6 our of 10, and anxiety 8or9 /10.  \"This hasn't been the best year.\"  Difficulty's with employer, finances, son, life stressors.     Patient further explains he was having difficulty sleeping while taking Lexapro at lower dose, though open to possible reason related to anxiety.   Recalls while taking Prozac also took Hydroxyzine at night which suspect feelings of grogginess, \"feeling so crappy was a combination of the two together.\"     9/13/22:  Patient presents today in office reporting  \"Effexor not working for me. Sweating a lot and feel zoned out\" and felt bloated, patient was started on 37.5 mg of Effexor at last appointment and decreased as needed Hydroxyzine from 25 to 10 mg by mouth every 6 hr as needed.  Patient reports stopped taking Effexor XR after almost 2 weeks due to adverse side effects.  Once patient stopped Effexor XR the symptoms resolved.     Patient tried lower dose of Hydroxyzine of 10 mg after stopping " "Effexor XR for 2 days and continued to feel tired, though not able to get to sleep, patient took off from work to help \"destress and difficulty sleeping.\" Patient has been off for approximately 2 weeks including scheduled off days.      Patient was able to schedule appointment with therapist for 9/28/22 at 2 pm.      Patient wife has been driving , although, patient did  son from school, and able to keep mind off of anxiety while talking to son.  School is 5-10 minutes away from patient home.     Patient does suffer from neck pain which worsens with CPAP/BiPap mask despite trying to loosen strap.  Not able to tolerate.    Patient most impairing symptom is the depression and anxiety as patient believes \"insomnia is a product of the anxiety.\" Patient complaint of inability to calm mind down, \"I don't try to think about it, it just hits me.\"       8/9/22:  INITIAL EVAL  Patient presents today in office with a history of anxiety and depression.   Patient began treatment approximately 10-12 yrs ago with PCP's, has never seen a behavioral health provider.  Patient initial demeanor presents as anxious, somewhat guarded, and flat, however, as visit progressed patient was very open with current stressors and feelings.      Depression: Patient complains of depression. He complains of anhedonia, depressed mood, difficulty concentrating, fatigue, feelings of worthlessness/guilt and negative thoughts of feeling family would be better off without him, though denies actual suicidal thoughts.  Patient admits to \"if I was gone everyone would be better off, I would never do it, I just think about it, fleeting throughout different parts of the day.\"  Patient reports while working it is easier, however, endorses to increased anxiety related to work.  \"a lot of the issues stem from work as well.\"  Denies action plan.  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  Patient was then given some acid reflex medications and had a cardiac workup which was negative.  Patient was then started on Prozac 20 mg 4/26/22.      Current manager is different than before, has had past problems with current female supervisor in 12/2020 while helping putting cylinders away, co-worker put in wrong bins but patient name was on the work, however, patient was punished and had told union, \" she had taken me out of the computer and she refused to put me back in the computer, had arguments prior to this incident,\" patient feels retaliation from this supervisor which has weighed on patient as supervisor now giving patient the \"cold shoulder\". And has difficulty with anxiety.  Patient reports past employer at [employer] did not have any problems, however, patient feels he does not share same  interests as fellow employees.  Patient has been at [employer] for 23 yrs, \"and I still feel like an outsider.\"     Patient admits to having feelings of chest pressure and minimal sweating to palms while driving, \"anytime I get an anxious or stressful situation almost,\" patient takes Hydroxyzine 25 mg and feels in zombie state, and 3-4 hrs later will take Buspar 10 mg which causes " "drowsiness, denies dizziness.  Patient believes anxiety symptoms while driving began after incident earlier this year with the forklift, March 2022.    Patient admits to only taking Buspar as needed, does not need or take while off work, and on weekends takes once if working as there is a \"lighter management\" .  Patient reports he is unable to take Prozac at exact same time with Buspar and hydroxyzine due to feelings of \"spacing out\".   Patient taking Prozac every morning which patient states, \"it makes me a little more social.  Not so much while driving to work, it's only 2 min.from my house. 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 \"High traffic area gets my chest going and I get anxious.\"          PHQ-9 Depression Screening  PHQ-9 Total Score:   6/19/2023 16     Little interest or pleasure in doing things?     Feeling down, depressed, or hopeless?     Trouble falling or staying asleep, or sleeping too much?     Feeling tired or having little energy?     Poor appetite or overeating?     Feeling bad about yourself - or that you are a failure or have let yourself or your family down?     Trouble concentrating on things, such as reading the newspaper or watching television?     Moving or speaking so slowly that other people could have noticed? Or the opposite - being so fidgety or restless that you have been moving around a lot more than usual?     Thoughts that you would be better off dead, or of hurting yourself in some way?     PHQ-9 Total Score       BEE-7    6/19/2023 15    Past Surgical History:  Past Surgical History:   Procedure Laterality Date    LUMBAR DECOMPRESSION  03/09/2016    [facility]  L4-5 lumbar decomp disckectomy and TLIF  sextant mtrix and stealth    LUMBAR FUSION Right 3/9/2016    Procedure: RT L4-5 LUMBAR DECOMPRESSION DISCECTOMY AND TLIF SEXTANT METRIX AND STEALTH;  Surgeon: Celestino Mackay MD;  Location: [facility] OR;  " Service:     PLANTAR FASCIA RELEASE Left 2/9/2021    Procedure: LEFT FOOT ENDOSCOPIC PLANTAR FASCIOTOMY;  Surgeon: Rafy Bauer DPM;  Location: [facility] OR;  Service: Podiatry;  Laterality: Left;    SPINE SURGERY      lumbar spinal diskectomy L4/L5  L5/S1 2009    WISDOM TOOTH EXTRACTION         Problem List:  Patient Active Problem List   Diagnosis    Lumbosacral disc disease    Work related injury    Post-operative state    Decreased sensation    Low back pain    Vitamin D deficiency    Acquired hammer toe of left foot    Cavus deformity of left foot    Hyperkeratosis    Pain in left foot    Plantar fasciitis    Dysthymia    Anxiety    Major depressive disorder with single episode, in partial remission    Seasonal allergic rhinitis due to pollen    Class 2 obesity due to excess calories without serious comorbidity with body mass index (BMI) of 36.0 to 36.9 in adult    Pruritus       Allergy:   No Known Allergies     Discontinued Medications:  There are no discontinued medications.      Current Medications:   Current Outpatient Medications   Medication Sig Dispense Refill    fexofenadine (Allegra Allergy) 180 MG tablet Take 1 tablet by mouth Daily. 90 tablet 0    FLUoxetine (PROzac) 20 MG capsule Take 3 capsules by mouth Every Morning for 90 days. Indications: Generalized Anxiety Disorder, Major Depressive Disorder 90 capsule 2    fluticasone (FLONASE) 50 MCG/ACT nasal spray 1 spray into the nostril(s) as directed by provider Daily. 16 g 3     No current facility-administered medications for this visit.       Past Medical History:  Past Medical History:   Diagnosis Date    Anxiety     Back pain     Depression     Head injury     Lumbar herniated disc     Lumbosacral disc disease     Obesity     Obstructive sleep apnea     C-PAP mask not working/ not currently treating    Panic disorder     PTSD (post-traumatic stress disorder)     Work related injury 10/30/2014    reinjured 8-13-15       Past Psychiatric  History:  Began Treatment: approximately 10 or more years ago with  PCP at the time  Diagnoses:Depression, Anxiety and suspected PTSD  Psychiatrist:Roberties  Therapist: 2016 through workmans comp prior to back surgery  Admission History:Denies  Medication Trials: Xanax and Zoloft initally 4725-5501 with -ineffective; 10-12 yrs ago placed on Prozac-ineffective; Effexor XR 8/9-9/13/22 sweating,zoned out,abd bloating; Buspar 10 mg as needed drowsiness; Hydroxyzine-drowsiness with low dose of 10 mg, next day grogginess ; Lexapro-insomnia after 5 weeks, possible related to anxiety, though self stopped mid Oct.2022    Self Harm: Denies  Suicide Attempts:Denies      Substance Abuse History:   Types:Denies all, including illicit  Withdrawal Symptoms:Denies  Longest Period Sober:Not Applicable   AA: Not applicable     Social History:  Martial Status:  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, 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)  Kids:Yes or If so, how many 1 son  age 16 as of 8/2022  House:Lives in a house   History: Denies  Access to Guns:  No    Social History     Socioeconomic History    Marital status:    Tobacco Use    Smoking status: Never    Smokeless tobacco: Never   Vaping Use    Vaping Use: Never used   Substance and Sexual Activity    Alcohol use: No    Drug use: Never    Sexual activity: Yes       Family History:   Suicide Attempts: Denies  Suicide Completions:Denies      Family History   Problem Relation Age of Onset    Diabetes Father        Developmental History:   Born: [place]  Siblings:1 brother  Childhood:  physical,mental-by father  High School:Completed  College:Denies    Mental Status Exam:   Hygiene:   good  Cooperation:  Cooperative  Eye Contact:  Good  Psychomotor Behavior:  Appropriate  Affect:  Appropriate  Mood: euthymic   Speech:  Normal  Thought Process:  Goal directed  Thought Content:  Mood congruent  Suicidal:  None  Homicidal:   "None  Hallucinations:  None  Delusion:  None  Memory:  Intact  Orientation:  Person, Place, Time and Situation  Reliability:  good  Insight:  Good  Judgement:  Good  Impulse Control:  Good  Physical/Medical Issues:  Yes KAILASH-untreated due to claustraphobia with mask, L-Spine back pain      Review of Systems:  Review of Systems   Constitutional:  Negative for diaphoresis and fatigue.   HENT:  Negative for drooling.    Eyes:  Negative for visual disturbance.   Respiratory:  Positive for apnea. Negative for cough and shortness of breath.         KAILASH, unable to tolerate mask, snores loudly   Cardiovascular:  Negative for chest pain, palpitations and leg swelling.   Gastrointestinal:  Negative for nausea and vomiting.   Endocrine: Negative for cold intolerance and heat intolerance.   Genitourinary:  Negative for difficulty urinating.   Musculoskeletal:  Negative for joint swelling.   Allergic/Immunologic: Negative for immunocompromised state.   Neurological:  Negative for dizziness, seizures, speech difficulty and numbness.   Psychiatric/Behavioral:  Negative for agitation, decreased concentration, hallucinations, self-injury, sleep disturbance and suicidal ideas. The patient is nervous/anxious. The patient is not hyperactive.        Physical Exam:  Physical Exam  Psychiatric:         Attention and Perception: Attention and perception normal.         Mood and Affect: Mood and affect normal.         Speech: Speech normal.         Behavior: Behavior normal. Behavior is cooperative.         Thought Content: Thought content normal. Thought content does not include suicidal ideation. Thought content does not include suicidal plan.         Cognition and Memory: Cognition and memory normal.         Judgment: Judgment normal.       Vital Signs:   /64   Pulse 90   Ht 175.3 cm (69.02\")   Wt 113 kg (248 lb 9.6 oz)   BMI 36.69 kg/mý      Lab Results:   Lab on 07/25/2023   Component Date Value Ref Range Status    PSA " 07/25/2023 0.483  0.000 - 4.000 ng/mL Final    Hepatitis C Ab 07/25/2023 Non-Reactive  Non-Reactive Final    Glucose 07/25/2023 96  65 - 99 mg/dL Final    BUN 07/25/2023 15  6 - 20 mg/dL Final    Creatinine 07/25/2023 1.05  0.76 - 1.27 mg/dL Final    Sodium 07/25/2023 137  136 - 145 mmol/L Final    Potassium 07/25/2023 4.3  3.5 - 5.2 mmol/L Final    Chloride 07/25/2023 103  98 - 107 mmol/L Final    CO2 07/25/2023 25.0  22.0 - 29.0 mmol/L Final    Calcium 07/25/2023 9.6  8.6 - 10.5 mg/dL Final    Total Protein 07/25/2023 7.2  6.0 - 8.5 g/dL Final    Albumin 07/25/2023 4.4  3.5 - 5.2 g/dL Final    ALT (SGPT) 07/25/2023 48 (H)  1 - 41 U/L Final    AST (SGOT) 07/25/2023 36  1 - 40 U/L Final    Alkaline Phosphatase 07/25/2023 46  39 - 117 U/L Final    Total Bilirubin 07/25/2023 0.4  0.0 - 1.2 mg/dL Final    Globulin 07/25/2023 2.8  gm/dL Final    A/G Ratio 07/25/2023 1.6  g/dL Final    BUN/Creatinine Ratio 07/25/2023 14.3  7.0 - 25.0 Final    Anion Gap 07/25/2023 9.0  5.0 - 15.0 mmol/L Final    eGFR 07/25/2023 88.7  >60.0 mL/min/1.73 Final    WBC 07/25/2023 6.08  3.40 - 10.80 10*3/mm3 Final    RBC 07/25/2023 5.06  4.14 - 5.80 10*6/mm3 Final    Hemoglobin 07/25/2023 15.8  13.0 - 17.7 g/dL Final    Hematocrit 07/25/2023 48.4  37.5 - 51.0 % Final    MCV 07/25/2023 95.7  79.0 - 97.0 fL Final    MCH 07/25/2023 31.2  26.6 - 33.0 pg Final    MCHC 07/25/2023 32.6  31.5 - 35.7 g/dL Final    RDW 07/25/2023 12.1 (L)  12.3 - 15.4 % Final    RDW-SD 07/25/2023 43.4  37.0 - 54.0 fl Final    MPV 07/25/2023 10.8  6.0 - 12.0 fL Final    Platelets 07/25/2023 203  140 - 450 10*3/mm3 Final    TSH 07/25/2023 1.870  0.270 - 4.200 uIU/mL Final    Free T4 07/25/2023 1.20  0.93 - 1.70 ng/dL Final    T4 results may be falsely increased if patient taking Biotin.    Total Cholesterol 07/25/2023 170  0 - 200 mg/dL Final    Triglycerides 07/25/2023 86  0 - 150 mg/dL Final    HDL Cholesterol 07/25/2023 56  40 - 60 mg/dL Final    LDL Cholesterol   07/25/2023 98  0 - 100 mg/dL Final    VLDL Cholesterol 07/25/2023 16  5 - 40 mg/dL Final    LDL/HDL Ratio 07/25/2023 1.73   Final    Hemoglobin A1C 07/25/2023 5.50  4.80 - 5.60 % Final    25 Hydroxy, Vitamin D 07/25/2023 29.0 (L)  30.0 - 100.0 ng/ml Final       EKG Results:  No orders to display       Imaging Results:  No Images in the past 120 days found..      Assessment & Plan   Diagnoses and all orders for this visit:    1. Generalized anxiety disorder (Primary)    2. Mild episode of recurrent major depressive disorder          Visit Diagnoses:    ICD-10-CM ICD-9-CM   1. Generalized anxiety disorder  F41.1 300.02   2. Mild episode of recurrent major depressive disorder  F33.0 296.31         PLAN:  Safety: No acute safety concerns  Therapy: Currently Seeing a Therapist SHIRLEY Leslie,  appointment scheduled 9/13/23  Risk Assessment: Risk of self-harm acutely is moderate  Risk factors include anxiety disorder, mood disorder,fleeting negative thoughts, and recent psychosocial stressors (pandemic). Protective factors include no family history, denies access to guns/weapons, no present SI, no history of suicide attempts or self-harm in the past, minimal AODA, healthcare seeking, future orientation, willingness to engage in care.  Risk of self-harm chronically is also moderate, but could be further elevated in the event of treatment noncompliance and/or AODA.  Meds:  Continue Prozac (Fluoxetine) 60 mg by mouth daily in the morning to target anxiety and depression.  Discussed all risks, benefits, alternatives, and side effects of Fluoxetine including but not limited to GI upset, decreased appetite, sexual dysfunction, bleeding risk, seizure risk, insomnia, anxiety, drowsiness, headache, tremor, nervousness, activation of kena or hypomania, increased fragility fracture risk, hyponatremia, ocular effects, serotonin syndrome, hypersensitivity reaction, and activation of suicidal ideation and behavior. Patient  educated on the need to practice safe sex while taking this medication. Discussed the need for patient to immediately call the office for any new or worsening symptoms, such as worsening depression; feeling nervous or restless; suicidal thoughts or actions; or other changes in mood or behavior, and all other concerns. Patient educated on medication compliance.  Patient verbalized understanding and is agreeable to taking Fluoxetine. Addressed all questions and concerns.  Reminded patient to take total dose of Prozac 60 mg at one time in the morning at 11 am as patient has been taking 40 mg at 11 am and 20 mg in the evening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

## 2023-08-28 NOTE — PATIENT INSTRUCTIONS
"1. Should you want to get in touch with your provider, CHINO Mcclain, please contact MY Medical Assistant, Meri, directly at [phone].  Recommend saving Meri's direct number in phone as this is the PREFERRED & EASIEST way to get in contact with your provider.  Please leave a voice mail if you do not get an answer and she will return your call within 24 hrs. You will NOT be able to contact provider on [employer]hart, as Behavioral Health Providers are restricted. YOU MUST CALL [phone]  If you need to speak with the on call provider after hours or on weekends, please Contact the [facility] ([phone]) and staff will be able to page the provider on call directly.     2.  In the event you need to cancel an appointment, please notify the office at least 24 hrs prior:   Contact **Meri Medical Assistant at [facility] directly at [phone] or the [facility] ([phone])     3. MEDICATION REFILLS:  PLEASE CALL THE PHARMACY TO REQUEST ALL MEDICATION REFILLS or via TopTenREVIEWS TO ENSURE YOU ARE RECEIVING YOUR MEDICATIONS IN A TIMELY MANNER. The pharmacy or [employer] will send this request ELECTRONICALLY to the ordering provider.   IF YOU USE AN AUTOMATED SERVICE AT THE PHARMACY FOR REFILLS AND ARE TOLD THERE ARE \"NO REFILLS REMAINING\"   PLEASE CALL THE PHARMACY & SPEAK TO A LIVE PERSON TO VERIFY IT IS THE MOST UP TO DATE PRESCRIPTION ON FILE.    All new prescriptions will have a different number, therefore, if you were given refills for a medication today or at last visit it will not have the same number as the previous prescription.     4.  In the event you have personal crisis, contact the following crisis numbers: Suicide Prevention Hotline 1-591.575.7117 or *988, RABIA Helpline 6-486-720-MYCN; [facility] Emergency Room [phone]; text HELLO to 925903; or 911.  If you feel like harming yourself or others, call 911 right away.  You can call the 986 Suicide and Crisis " "Lifeline at  988   to speak with a counselor at the [employer] or you can connect with one using their online chat  .    5.  Never stop an antidepressant medicine without first talking to a healthcare provider. Suddenly stopping this type of medication can cause withdrawal symptoms.    6.  Counseling and talk therapy  Counseling or therapy teaches you new coping skills and more adaptive ways of thinking about problems. These tools can help you make positive changes. The benefits of counseling often last long after treatment sessions have stopped.    7.   We would appreciate your feedback, please scan the QRS code on the back of your appointment card (or see below) and complete a brief survey.  [place] location is still not available, so please click \"[place]\" location.  Thank you      SPECIFIC RECOMMENDATIONS:     1.      Medications discussed at this encounter:                   -  Patient instructed to request refills when appropriate, when down to 5-7 days remaining via  pharmacy or via [email or web address].       2.      Psychotherapy recommendations: Continue with current therapist.      3.     Return to clinic:3 months, Tues. 11/28/23 at 1140 am    Please arrive at least 15 minutes before your scheduled appointment time to complete check in process.      IF you are scheduled for a [email or web address] VIDEO visit, YOU MUST COMPLETE THE \"E-CHECK IN\" PROCESS PRIOR TO BEGINNING THE VISIT, YOU WILL NO LONGER RECEIVE A PHONE CALL PRIOR TO ALL VIDEO VISITS; You may still complete the E-Check in for in office visits prior to appointment, you will receive multiple text/email reminders which will direct you further if needed.           "

## 2023-09-22 DIAGNOSIS — F41.1 GENERALIZED ANXIETY DISORDER: ICD-10-CM

## 2023-09-22 DIAGNOSIS — F33.1 MAJOR DEPRESSIVE DISORDER, RECURRENT EPISODE, MODERATE: ICD-10-CM

## 2023-09-25 RX ORDER — FLUOXETINE HYDROCHLORIDE 20 MG/1
60 CAPSULE ORAL EVERY MORNING
Qty: 270 CAPSULE | Refills: 0 | Status: SHIPPED | OUTPATIENT
Start: 2023-09-25 | End: 2023-12-24

## 2023-09-25 NOTE — TELEPHONE ENCOUNTER
FLUoxetine (PROzac) 20 MG capsule (06/20/2023)     Pt has upcoming appt  Appointment with Archana Macario APRN (11/28/2023)     Medication order pended.

## 2023-11-28 ENCOUNTER — TELEPHONE (OUTPATIENT)
Dept: BEHAVIORAL HEALTH | Facility: CLINIC | Age: 48
End: 2023-11-28

## 2023-11-28 NOTE — TELEPHONE ENCOUNTER
Called LMVM for patient to callback to reschedule appointment for today due to Provider being out sick

## 2023-12-07 ENCOUNTER — OFFICE VISIT (OUTPATIENT)
Dept: BEHAVIORAL HEALTH | Facility: CLINIC | Age: 48
End: 2023-12-07
Payer: COMMERCIAL

## 2023-12-07 VITALS
DIASTOLIC BLOOD PRESSURE: 68 MMHG | WEIGHT: 249.4 LBS | HEART RATE: 73 BPM | HEIGHT: 69 IN | BODY MASS INDEX: 36.94 KG/M2 | SYSTOLIC BLOOD PRESSURE: 112 MMHG

## 2023-12-07 DIAGNOSIS — F41.1 GENERALIZED ANXIETY DISORDER: ICD-10-CM

## 2023-12-07 DIAGNOSIS — F33.1 MAJOR DEPRESSIVE DISORDER, RECURRENT EPISODE, MODERATE: ICD-10-CM

## 2023-12-07 RX ORDER — FLUOXETINE HYDROCHLORIDE 40 MG/1
80 CAPSULE ORAL EVERY MORNING
Qty: 180 CAPSULE | Refills: 0 | Status: SHIPPED | OUTPATIENT
Start: 2023-12-07 | End: 2024-03-06

## 2023-12-07 NOTE — PATIENT INSTRUCTIONS
"1. Should you want to get in touch with your provider, CHINO Mcclain, please contact MY Medical Assistant, Meri, directly at [phone].  Recommend saving Meri's direct number in phone as this is the PREFERRED & EASIEST way to get in contact with your provider.  Please leave a voice mail if you do not get an answer and she will return your call within 24 hrs. You will NOT be able to contact provider on [employer], as Behavioral Health Providers are restricted. YOU MUST CALL [phone]  If you need to speak with the on call provider after hours or on weekends, please Contact the [facility] ([phone]) and staff will be able to page the provider on call directly.     2.  In the event you need to cancel an appointment, please notify the office at least 24 hrs prior:   Contact **Meri Medical Assistant at [facility] directly at [phone] or the [facility] ([phone])     3. MEDICATION REFILLS:  PLEASE CALL THE PHARMACY TO REQUEST ALL MEDICATION REFILLS or via [employer] TO ENSURE YOU ARE RECEIVING YOUR MEDICATIONS IN A TIMELY MANNER. The pharmacy or [employer] will send this request ELECTRONICALLY to the ordering provider.   IF YOU USE AN AUTOMATED SERVICE AT THE PHARMACY FOR REFILLS AND ARE TOLD THERE ARE \"NO REFILLS REMAINING\"   PLEASE CALL THE PHARMACY & SPEAK TO A LIVE PERSON TO VERIFY IT IS THE MOST UP TO DATE PRESCRIPTION ON FILE.    All new prescriptions will have a different number, therefore, if you were given refills for a medication today or at last visit it will not have the same number as the previous prescription.     4.  In the event you have personal crisis, contact the following crisis numbers: Suicide Prevention Hotline 1-772.910.5942 or *988, RABIA Helpline 2-478-799-FUSI; [facility] Emergency Room [phone]; text HELLO to 208029; or 911.  If you feel like harming yourself or others, call 911 right away.  You can call the 989 Suicide and Crisis " "Lifeline at  988   to speak with a counselor at the [facility], or you can connect with one using their online chat  .    5.  Never stop an antidepressant medicine without first talking to a healthcare provider. Suddenly stopping this type of medication can cause withdrawal symptoms.    6.  Counseling and talk therapy  Counseling or therapy teaches you new coping skills and more adaptive ways of thinking about problems. These tools can help you make positive changes. The benefits of counseling often last long after treatment sessions have stopped.    7.   We would appreciate your feedback, please scan the QRS code on the back of your appointment card (or see below) and complete a brief survey.  [place] location is still not available, so please click \"[place]\" location.  Thank you      SPECIFIC RECOMMENDATIONS:     1.      Medications discussed at this encounter: Increase Prozac from 60 mg TO 80 mg, order for 40 mg caps, take 2 to equal 80 mg, may start taking 4 of the 20 mg caps until new dose available.     2.      Psychotherapy recommendations: Continue with current therapist.      3.     Return to clinic: 8 weeks, Tues. 1/30/24 at 1140 am     Please arrive at least 15 minutes before your scheduled appointment time to complete check in process.      IF you are scheduled for a [employer] VIDEO visit, YOU MUST COMPLETE THE \"E-CHECK IN\" PROCESS PRIOR TO BEGINNING THE VISIT, YOU WILL NO LONGER RECEIVE A PHONE CALL PRIOR TO ALL VIDEO VISITS; You may still complete the E-Check in for in office visits prior to appointment, you will receive multiple text/email reminders which will direct you further if needed.           "
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
## 2024-01-23 ENCOUNTER — TELEPHONE (OUTPATIENT)
Dept: FAMILY MEDICINE CLINIC | Age: 49
End: 2024-01-23

## 2024-01-23 ENCOUNTER — OFFICE VISIT (OUTPATIENT)
Dept: FAMILY MEDICINE CLINIC | Age: 49
End: 2024-01-23
Payer: COMMERCIAL

## 2024-01-23 VITALS
HEART RATE: 83 BPM | OXYGEN SATURATION: 96 % | WEIGHT: 241 LBS | HEIGHT: 69 IN | SYSTOLIC BLOOD PRESSURE: 118 MMHG | TEMPERATURE: 98.5 F | BODY MASS INDEX: 35.7 KG/M2 | DIASTOLIC BLOOD PRESSURE: 79 MMHG

## 2024-01-23 DIAGNOSIS — J30.1 SEASONAL ALLERGIC RHINITIS DUE TO POLLEN: ICD-10-CM

## 2024-01-23 DIAGNOSIS — E66.09 CLASS 1 OBESITY DUE TO EXCESS CALORIES WITHOUT SERIOUS COMORBIDITY WITH BODY MASS INDEX (BMI) OF 34.0 TO 34.9 IN ADULT: ICD-10-CM

## 2024-01-23 DIAGNOSIS — Z23 IMMUNIZATION DUE: ICD-10-CM

## 2024-01-23 DIAGNOSIS — Z79.899 HIGH RISK MEDICATION USE: Primary | ICD-10-CM

## 2024-01-23 DIAGNOSIS — F32.4 MAJOR DEPRESSIVE DISORDER WITH SINGLE EPISODE, IN PARTIAL REMISSION: ICD-10-CM

## 2024-01-23 DIAGNOSIS — Z76.89 ENCOUNTER FOR NEW MEDICATION PRESCRIPTION: Primary | ICD-10-CM

## 2024-01-23 DIAGNOSIS — F41.9 ANXIETY: ICD-10-CM

## 2024-01-23 DIAGNOSIS — E55.9 VITAMIN D DEFICIENCY: ICD-10-CM

## 2024-01-23 DIAGNOSIS — Z79.899 HIGH RISK MEDICATION USE: ICD-10-CM

## 2024-01-23 DIAGNOSIS — E66.3 OVERWEIGHT: Primary | ICD-10-CM

## 2024-01-23 LAB
AMPHET+METHAMPHET UR QL: NEGATIVE
AMPHETAMINES UR QL: NEGATIVE
BARBITURATES UR QL SCN: NEGATIVE
BENZODIAZ UR QL SCN: POSITIVE
BUPRENORPHINE SERPL-MCNC: NEGATIVE NG/ML
CANNABINOIDS SERPL QL: NEGATIVE
COCAINE UR QL: NEGATIVE
EXPIRATION DATE: ABNORMAL
Lab: ABNORMAL
MDMA UR QL SCN: NEGATIVE
METHADONE UR QL SCN: NEGATIVE
MORPHINE/OPIATES SCREEN, URINE: NEGATIVE
OXYCODONE UR QL SCN: NEGATIVE
PCP UR QL SCN: NEGATIVE

## 2024-01-23 PROCEDURE — G0480 DRUG TEST DEF 1-7 CLASSES: HCPCS | Performed by: FAMILY MEDICINE

## 2024-01-23 RX ORDER — CETIRIZINE HYDROCHLORIDE 10 MG/1
10 TABLET ORAL DAILY
Qty: 90 TABLET | Refills: 1 | Status: SHIPPED | OUTPATIENT
Start: 2024-01-23

## 2024-01-23 RX ORDER — FEXOFENADINE HCL 180 MG/1
180 TABLET ORAL DAILY
Qty: 90 TABLET | Refills: 0 | Status: SHIPPED | OUTPATIENT
Start: 2024-01-23

## 2024-01-23 RX ORDER — BUSPIRONE HYDROCHLORIDE 15 MG/1
15 TABLET ORAL 2 TIMES DAILY PRN
Qty: 30 TABLET | Refills: 1 | Status: SHIPPED | OUTPATIENT
Start: 2024-01-23

## 2024-01-23 RX ORDER — PHENTERMINE HYDROCHLORIDE 37.5 MG/1
CAPSULE ORAL
Qty: 15 CAPSULE | Refills: 0 | Status: SHIPPED | OUTPATIENT
Start: 2024-01-23 | End: 2024-01-23

## 2024-01-23 RX ORDER — PHENTERMINE HYDROCHLORIDE 37.5 MG/1
TABLET ORAL
Qty: 15 TABLET | Refills: 0 | Status: SHIPPED | OUTPATIENT
Start: 2024-01-23

## 2024-01-23 NOTE — TELEPHONE ENCOUNTER
Pharmacy Name:  [facility]     Pharmacy representative phone number: [phone]    What medication are you calling in regards to:     phentermine 37.5 MG capsule       What question does the pharmacy have: PHARMACY IS ASKING FOR THIS MEDICATION TO BE CHANGED TO TABLETS INSTEAD OF CAPSULES

## 2024-01-23 NOTE — PROGRESS NOTES
Pranav Calderon presents to [facility].    Chief Complaint: obesity concerns    Subjective     History of Present Illness:      He complains of ongoing allergies, flonase helps, tried zyrtec and it made him sleepy, flonase is not as effective as it was, feels thick phglem in back of his throat, occas cough     He has sleep apnea and doesn't tolerate cpapor bipap, defers f/u with sleep specialist, recc wt loss and routine exercise, he needs wt loss to help his sleep apnea so he can improve or qualify for the inspire devise     Chronic anxiety and depression are stable on prozac, he states most of his issues relate to the stress of his obesity, he desperately wants to lose weight, he did have a panic attack for the first time in a long timea couple weeks ago, resolved with time, he was trembling and felt SOA (he has experienced panic attacks in past) . He doesn't sleep well due to sleep apnea.  Denies SI/HI     He has lost 8#s with healthy diet and exercise for past 6 months, he has plateaued on his wt loss over the past month.             Result Review   The following data was reviewed by Fauzia Ballard MD on 01/23/2024.  Lab Results   Component Value Date    WBC 6.08 07/25/2023    HGB 15.8 07/25/2023    HCT 48.4 07/25/2023    MCV 95.7 07/25/2023     07/25/2023     Lab Results   Component Value Date    GLUCOSE 96 07/25/2023    BUN 15 07/25/2023    CREATININE 1.05 07/25/2023    EGFR 88.7 07/25/2023    BCR 14.3 07/25/2023    K 4.3 07/25/2023    CO2 25.0 07/25/2023    CALCIUM 9.6 07/25/2023    ALBUMIN 4.4 07/25/2023    BILITOT 0.4 07/25/2023    AST 36 07/25/2023    ALT 48 (H) 07/25/2023     Lab Results   Component Value Date    CHOL 170 07/25/2023    CHLPL 172 08/05/2020    TRIG 86 07/25/2023    HDL 56 07/25/2023    LDL 98 07/25/2023     Lab Results   Component Value Date    TSH 1.870 07/25/2023     Lab Results   Component Value Date    HGBA1C 5.50 07/25/2023     Lab Results    Component Value Date    PSA 0.483 07/25/2023         Lab Results   Component Value Date    JXPG59DQ 29.0 (L) 07/25/2023               Assessment and Plan:   Diagnoses and all orders for this visit:    1. Encounter for new medication prescription (Primary)  -     ECG 12 Lead    2. Anxiety  Comments:  overall stable on current meds: prozac, tolerates meds well, 1 panic attack recently -will try him on buspar prn for panic attacks  Orders:  -     busPIRone (BUSPAR) 15 MG tablet; Take 1 tablet by mouth 2 (Two) Times a Day As Needed (anxiety).  Dispense: 30 tablet; Refill: 1    3. Major depressive disorder with single episode, in partial remission  Comments:  overall stable on current meds: prozac, tolerates meds well, 1 panic attack recently -will try him on buspar prn for panic attacks    4. Seasonal allergic rhinitis due to pollen  Comments:  he is worse, recc he try local honey, will add zyrtec to allegra and flonase regimen.  Orders:  -     cetirizine (zyrTEC) 10 MG tablet; Take 1 tablet by mouth Daily.  Dispense: 90 tablet; Refill: 1  -     fexofenadine (Allegra Allergy) 180 MG tablet; Take 1 tablet by mouth Daily.  Dispense: 90 tablet; Refill: 0    5. Vitamin D deficiency  Comments:  stable on OTC vitamin D supplementation 2000 units a day    6. Class 1 obesity due to excess calories without serious comorbidity with body mass index (BMI) of 34.0 to 34.9 in adult  -     phentermine 37.5 MG capsule; 1/2 pill daily  Dispense: 15 capsule; Refill: 0    7. Immunization due  -     COVID-19 F23 (Pfizer) 12yrs+ (COMIRNATY)    8. Seasonal allergic rhinitis due to pollen  Comments:  Worsening allergy symptoms.  Did not tolerate Zyrtec.  Already on Flonase.  We will add Allegra  Orders:  -     cetirizine (zyrTEC) 10 MG tablet; Take 1 tablet by mouth Daily.  Dispense: 90 tablet; Refill: 1  -     fexofenadine (Allegra Allergy) 180 MG tablet; Take 1 tablet by mouth Daily.  Dispense: 90 tablet; Refill: 0              Objective  "    Medications:  Current Outpatient Medications   Medication Instructions    busPIRone (BUSPAR) 15 mg, Oral, 2 Times Daily PRN    cetirizine (ZYRTEC) 10 mg, Oral, Daily    fexofenadine (ALLEGRA ALLERGY) 180 mg, Oral, Daily    FLUoxetine (PROZAC) 80 mg, Oral, Every Morning    fluticasone (FLONASE) 50 MCG/ACT nasal spray 1 spray, Nasal, Daily    phentermine 37.5 MG capsule 1/2 pill daily        Vital Signs:   /79 (BP Location: Left arm, Patient Position: Sitting)   Pulse 83   Temp 98.5 °F (36.9 °C) (Oral)   Ht 175.3 cm (69.02\")   Wt 109 kg (241 lb)   SpO2 96%   BMI 35.57 kg/m²             Physical Exam:  Physical Exam  Vitals and nursing note reviewed.   Constitutional:       General: He is not in acute distress.     Appearance: Normal appearance. He is obese. He is not ill-appearing, toxic-appearing or diaphoretic.   HENT:      Head: Normocephalic and atraumatic.      Right Ear: Tympanic membrane, ear canal and external ear normal.      Left Ear: Tympanic membrane, ear canal and external ear normal.      Nose: No congestion or rhinorrhea.      Mouth/Throat:      Mouth: Mucous membranes are moist.      Pharynx: Oropharynx is clear. No oropharyngeal exudate or posterior oropharyngeal erythema.   Eyes:      Extraocular Movements: Extraocular movements intact.      Conjunctiva/sclera: Conjunctivae normal.      Pupils: Pupils are equal, round, and reactive to light.   Cardiovascular:      Rate and Rhythm: Normal rate and regular rhythm.      Heart sounds: Normal heart sounds.   Pulmonary:      Effort: Pulmonary effort is normal.      Breath sounds: Normal breath sounds. No wheezing, rhonchi or rales.   Abdominal:      General: Abdomen is flat.      Palpations: Abdomen is soft. There is no mass.      Tenderness: There is no abdominal tenderness.      Hernia: No hernia is present.   Musculoskeletal:      Cervical back: Neck supple. No rigidity.      Right lower leg: No edema.      Left lower leg: No edema. "   Lymphadenopathy:      Cervical: No cervical adenopathy.   Skin:     General: Skin is warm and dry.   Neurological:      General: No focal deficit present.      Mental Status: He is alert and oriented to person, place, and time. Mental status is at baseline.   Psychiatric:         Mood and Affect: Mood normal.         Behavior: Behavior normal.         Thought Content: Thought content normal.         Judgment: Judgment normal.           Review of Systems:  Review of Systems   Constitutional:  Negative for chills and fever.   HENT:  Negative for congestion, ear discharge and sore throat.    Respiratory:  Negative for shortness of breath.    Cardiovascular:  Negative for chest pain.   Gastrointestinal:  Positive for GERD. Negative for abdominal pain, constipation, diarrhea, nausea and vomiting.   Genitourinary:  Negative for flank pain.   Neurological:  Positive for headache. Negative for dizziness.   Psychiatric/Behavioral:  Positive for sleep disturbance and depressed mood. Negative for suicidal ideas. The patient is nervous/anxious.           ECG 12 Lead    Date/Time: 1/23/2024 12:39 PM  Performed by: Fauzia Ballard MD    Authorized by: Fauzia Ballard MD  Comparison: not compared with previous ECG   Rhythm: sinus rhythm  Rate: normal  Conduction: conduction normal  ST Segments: ST segments normal  T inversion: aVL and V1  T flattening: aVF and V2  QRS axis: normal  Other: no other findings    Clinical impression: normal ECG  Comments: NSR, HKM           Follow Up   No follow-ups on file.    Part of this note may be an electronic transcription/translation of spoken language to printed   text using the Dragon Dictation System.            Medical History:  Medications Discontinued During This Encounter   Medication Reason    fexofenadine (Allegra Allergy) 180 MG tablet Reorder      Past Medical History:    Anxiety    Back pain    Depression    Head injury    Lumbar herniated disc    Lumbosacral disc  disease    Obesity    Obstructive sleep apnea    C-PAP mask not working/ not currently treating    Panic disorder    PTSD (post-traumatic stress disorder)    Work related injury    reinjured 8-13-15     Past Surgical History:    LUMBAR DECOMPRESSION    [facility]  L4-5 lumbar decomp disckectomy and TLIF  sextant mtrix and stealth    LUMBAR FUSION    Procedure: RT L4-5 LUMBAR DECOMPRESSION DISCECTOMY AND TLIF SEXTANT METRIX AND STEALTH;  Surgeon: Celestino Mackay MD;  Location: [facility] MAIN OR;  Service:     PLANTAR FASCIA RELEASE    Procedure: LEFT FOOT ENDOSCOPIC PLANTAR FASCIOTOMY;  Surgeon: Rafy Bauer DPM;  [place] [facility] [place]  Service: Podiatry;  Laterality: Left;    SPINE SURGERY    lumbar spinal diskectomy L4/L5  L5/S1 2009    WISDOM TOOTH EXTRACTION      Family History   Problem Relation Age of Onset    Diabetes Father      Social History     Tobacco Use    Smoking status: Never    Smokeless tobacco: Never   Substance Use Topics    Alcohol use: No       Health Maintenance Due   Topic Date Due    COLORECTAL CANCER SCREENING  Never done        Immunization History   Administered Date(s) Administered    COVID-19 (PFIZER) Purple Cap Monovalent 04/02/2021, 04/28/2021    COVID-19 F23 (PFIZER) 12YRS+ (COMIRNATY) 01/23/2024    Flu Vaccine Intradermal Quad 18-64YR 10/19/2021    Influenza Injectable Mdck Pf Quad 10/19/2021, 10/25/2022, 09/28/2023    Influenza, Unspecified 10/25/2022    Tdap 07/25/2023       No Known Allergies

## 2024-01-30 ENCOUNTER — OFFICE VISIT (OUTPATIENT)
Dept: BEHAVIORAL HEALTH | Facility: CLINIC | Age: 49
End: 2024-01-30
Payer: COMMERCIAL

## 2024-01-30 VITALS
HEIGHT: 69 IN | HEART RATE: 99 BPM | DIASTOLIC BLOOD PRESSURE: 68 MMHG | WEIGHT: 240 LBS | SYSTOLIC BLOOD PRESSURE: 102 MMHG | BODY MASS INDEX: 35.55 KG/M2

## 2024-01-30 DIAGNOSIS — Z79.899 MEDICATION MANAGEMENT: ICD-10-CM

## 2024-01-30 DIAGNOSIS — F41.1 GENERALIZED ANXIETY DISORDER: ICD-10-CM

## 2024-01-30 DIAGNOSIS — F33.0 MILD EPISODE OF RECURRENT MAJOR DEPRESSIVE DISORDER: Primary | ICD-10-CM

## 2024-01-30 PROCEDURE — 99214 OFFICE O/P EST MOD 30 MIN: CPT | Performed by: NURSE PRACTITIONER
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
## 2024-01-30 NOTE — PATIENT INSTRUCTIONS
"1. Should you want to get in touch with your provider, CHINO Mcclain, please contact MY Medical Assistant, Meri, directly at [phone].  Recommend saving Meri's direct number in phone as this is the PREFERRED & EASIEST way to get in contact with your provider.  Please leave a voice mail if you do not get an answer and she will return your call within 24 hrs. You will NOT be able to contact provider on [employer], as Behavioral Health Providers are restricted. YOU MUST CALL [phone]  If you need to speak with the on call provider after hours or on weekends, please Contact the [facility] ([phone]) and staff will be able to page the provider on call directly.     2.  In the event you need to cancel an appointment, please notify the office at least 24 hrs prior:   Contact **Meri Medical Assistant at [facility] directly at [phone] or the [facility] ([phone])     3. MEDICATION REFILLS:  PLEASE CALL THE PHARMACY TO REQUEST ALL MEDICATION REFILLS or via [employer] TO ENSURE YOU ARE RECEIVING YOUR MEDICATIONS IN A TIMELY MANNER. The pharmacy or [employer] will send this request ELECTRONICALLY to the ordering provider.   IF YOU USE AN AUTOMATED SERVICE AT THE PHARMACY FOR REFILLS AND ARE TOLD THERE ARE \"NO REFILLS REMAINING\"   PLEASE CALL THE PHARMACY & SPEAK TO A LIVE PERSON TO VERIFY IT IS THE MOST UP TO DATE PRESCRIPTION ON FILE.    All new prescriptions will have a different number, therefore, if you were given refills for a medication today or at last visit it will not have the same number as the previous prescription.     4.  In the event you have personal crisis, contact the following crisis numbers: Suicide Prevention Hotline 1-398.939.1180 or *988, [facility] Helpline 7-395-006-FFGI; [facility] Emergency Room [phone]; text HELLO to 787818; or 911.  If you feel like harming yourself or others, call 911 right away.  You can call the 989 Suicide and Crisis " OT Evaluation     Today's date: 2019  Patient name: Constantino Bhatt  : 1972  MRN: [id number]  Referring provider: Nani Freire MD  Dx:   Encounter Diagnosis     ICD-10-CM    1  Sprain of metacarpophalangeal (MCP) joint of left little finger, subsequent encounter S63 657D Ambulatory referral to Occupational Therapy                  Assessment  Assessment details: Karen presents with R little finger pain following a fall where she  Caught her small finger   She has been using haider tape   She has pain with gripping and  ADD little finger   She has dorsal capsule limitations that limit MCP flexion   She has weak   Strength   Impairments: abnormal or restricted ROM, lacks appropriate home exercise program, pain with function and weight-bearing intolerance  Other impairment: capsular limitation   Functional limitations: limited with forceful gripping Understanding of Dx/Px/POC: good   Prognosis: good    Goals  STG- 2 wks  1- I with JANICE  2- improve pain 25%  3- improve MCP flex 10    LTG-  5 wks  1-no pain   2-increase  MCP flex 15  3-improve  strength 15#    Plan  Plan details: Kai STINSON MCP  Strengthening  HEP  Patient would benefit from: OT eval and skilled occupational therapy  Planned modality interventions: ultrasound and thermotherapy: hydrocollator packs  Planned therapy interventions: joint mobilization, manual therapy, massage, therapeutic exercise, home exercise program and stretching  Frequency: 2x week  Duration in weeks: 5  Treatment plan discussed with: patient        Subjective Evaluation    History of Present Illness  Date of onset: 2018  Mechanism of injury: trauma  Mechanism of injury: Karen fell coming in from the garage and caught small finger on the door jam  She was seen at care now followed by Dr Armando Thayer   She has been haider taped since    Quality of life: good    Pain  Current pain ratin  At best pain ratin  At worst pain ratin  Quality: sharp and dull "[employer] at  988   to speak with a counselor at the [facility], or you can connect with one using their online chat  .    5.  Never stop an antidepressant medicine without first talking to a healthcare provider. Suddenly stopping this type of medication can cause withdrawal symptoms.    6.  Counseling and talk therapy  Counseling or therapy teaches you new coping skills and more adaptive ways of thinking about problems. These tools can help you make positive changes. The benefits of counseling often last long after treatment sessions have stopped.    7.   We would appreciate your feedback, please scan the QRS code on the back of your appointment card (or see below) and complete a brief survey.  [place] location is still not available, so please click \"[place]\" location.  Thank you      SPECIFIC RECOMMENDATIONS:     1.      Medications discussed at this encounter:                   -  DO NOT TAKE Buspar as this made you drowsy in the past at 10 mg, and medication would need to be taken routinely at least twice a day for effectiveness.     Patient instructed to request refills when appropriate, when down to 5-7 days remaining via  pharmacy or via [employer].       2.      Psychotherapy recommendations: Continue with current therapist.      3.     Return to clinic: 4 months, Monday 6/3/24 at 1140 am     Please arrive at least 15 minutes before your scheduled appointment time to complete check in process.      IF you are scheduled for a [employer] VIDEO visit, YOU MUST COMPLETE THE \"E-CHECK IN\" PROCESS PRIOR TO BEGINNING THE VISIT, YOU WILL NO LONGER RECEIVE A PHONE CALL PRIOR TO ALL VIDEO VISITS; You may still complete the E-Check in for in office visits prior to appointment, you will receive multiple text/email reminders which will direct you further if needed.           " ache  Relieving factors: support and rest  Aggravating factors: lifting    Social Support  Steps to enter house: yes  Stairs in house: yes   Lives in: multiple-level home  Lives with: young children and spouse    Employment status: working  Hand dominance: right    Treatments  Previous treatment: immobilization  Patient Goals  Patient goals for therapy: decreased edema, decreased pain, increased motion, increased strength, independence with ADLs/IADLs and return to sport/leisure activities  Patient goal: use my hand again with no pain         Objective     Palpation     Additional Palpation Details  Tender L little finger RCL at MCP ,  And interossei     Neurological Testing     Additional Neurological Details  Denies  parasthesias     Active Range of Motion     Left Wrist   Normal active range of motion    Right Digits   Flexion   Little     MCP: 64    PIP: 80    DIP: 60  Extension   Little     MCP: 0    PIP: 0    DIP: 0    Additional Active Range of Motion Details  Pain with Active MCP ADD 5th    Passive Range of Motion     Right Digits   Flexion   Little     MCP: 62    PIP: 90    DIP: 80    Additional Passive Range of Motion Details  Capsular tightness limits MCP flex    Strength/Myotome Testing     Left Wrist/Hand      (2nd hand position)     Trial 1: 35    Right Wrist/Hand      (2nd hand position)     Trial 1: 65    Tests     Left Wrist/Hand   Positive extrinsic extensor tightness  Negative intrinsic muscle tightness       Swelling     Left Wrist/Hand   Circumference MCP: 19 6 cm    Right Wrist/Hand   Circumference MCP: 20 cm          Precautions: universal    Daily Treatment Diary     Manual  2/26            [place] hand 3m            MOB MCP  3m            Dorsal Capsular stretches 2m                                          Exercise Diary  2/26            AROM digits  10x HEP - A/PROM 5x day                Modalities  2/26             with flex 10m

## 2024-01-31 LAB — BENZODIAZ CTO UR CFM-MCNC: NEGATIVE NG/ML

## 2024-02-23 ENCOUNTER — TELEPHONE (OUTPATIENT)
Dept: FAMILY MEDICINE CLINIC | Age: 49
End: 2024-02-23
Payer: COMMERCIAL

## 2024-02-23 DIAGNOSIS — E66.3 OVERWEIGHT: ICD-10-CM

## 2024-02-23 RX ORDER — PHENTERMINE HYDROCHLORIDE 37.5 MG/1
TABLET ORAL
Qty: 15 TABLET | Refills: 0 | Status: CANCELLED | OUTPATIENT
Start: 2024-02-23

## 2024-02-23 NOTE — TELEPHONE ENCOUNTER
Caller: Pranav Calderon    Relationship: Self    Best call back number: [phone]    Requested Prescriptions:   Requested Prescriptions     Pending Prescriptions Disp Refills    phentermine (ADIPEX-P) 37.5 MG tablet 15 tablet 0     Si/2 tablet po daily        Pharmacy where request should be sent: [facility] [id number] [place], [place] - [address] [facility] - [phone]  - [phone] FX     Last office visit with prescribing clinician: 2024   Last telemedicine visit with prescribing clinician: Visit date not found   Next office visit with prescribing clinician: 2024       Does the patient have less than a 3 day supply:  [x] Yes  [] No    Would you like a call back once the refill request has been completed: [] Yes [] No    If the office needs to give you a call back, can they leave a voicemail: [] Yes [] No    Bridget Becerra Rep   24 09:22 EST

## 2024-03-04 DIAGNOSIS — F41.1 GENERALIZED ANXIETY DISORDER: ICD-10-CM

## 2024-03-04 DIAGNOSIS — F33.1 MAJOR DEPRESSIVE DISORDER, RECURRENT EPISODE, MODERATE: ICD-10-CM

## 2024-03-05 RX ORDER — FLUOXETINE HYDROCHLORIDE 40 MG/1
80 CAPSULE ORAL EVERY MORNING
Qty: 180 CAPSULE | Refills: 0 | Status: SHIPPED | OUTPATIENT
Start: 2024-03-05

## 2024-03-12 ENCOUNTER — OFFICE VISIT (OUTPATIENT)
Dept: FAMILY MEDICINE CLINIC | Age: 49
End: 2024-03-12
Payer: COMMERCIAL

## 2024-03-12 VITALS
WEIGHT: 235 LBS | BODY MASS INDEX: 34.8 KG/M2 | HEIGHT: 69 IN | OXYGEN SATURATION: 96 % | HEART RATE: 80 BPM | SYSTOLIC BLOOD PRESSURE: 121 MMHG | DIASTOLIC BLOOD PRESSURE: 81 MMHG

## 2024-03-12 DIAGNOSIS — Z71.3 WEIGHT LOSS COUNSELING, ENCOUNTER FOR: ICD-10-CM

## 2024-03-12 DIAGNOSIS — Z79.899 HIGH RISK MEDICATION USE: ICD-10-CM

## 2024-03-12 DIAGNOSIS — K21.9 GASTROESOPHAGEAL REFLUX DISEASE WITHOUT ESOPHAGITIS: ICD-10-CM

## 2024-03-12 DIAGNOSIS — J30.1 SEASONAL ALLERGIC RHINITIS DUE TO POLLEN: ICD-10-CM

## 2024-03-12 DIAGNOSIS — E66.3 OVERWEIGHT: Primary | ICD-10-CM

## 2024-03-12 NOTE — PROGRESS NOTES
Pranav Calderon presents to [facility].    Chief Complaint: obesity concerns    Subjective     History of Present Illness:      He complains of ongoing allergies, flonase helps, tried zyrtec and it made him sleepy, flonase is not as effective as it was, feels thick phglem in back of his throat, occas cough     He has sleep apnea and doesn't tolerate cpapor bipap, defers f/u with sleep specialist, recc wt loss and routine exercise, he needs wt loss to help his sleep apnea so he can improve or qualify for the inspire devise     Chronic anxiety and depression are stable on prozac, he states most of his issues relate to the stress of his obesity, he desperately wants to lose weight, he did have a panic attack for the first time in a long timea couple weeks ago, resolved with time, he was trembling and felt SOA (he has experienced panic attacks in past) . He doesn't sleep well due to sleep apnea.  Denies SI/HI     He has lost 13#s with healthy diet and exercise for past 6 months, he has plateaued on his wt loss over the past month and he has gained 4 # off med for a few days, he tolerated the phentermine well and wants to continue treatment, he understands he has got to do his part with low calorie diet and daily exercise, he is committed to work on this, counseled on low calorie diet.        His GERD is acting up and he would like to start treatment for this.          Result Review   The following data was reviewed by Fauzia Ballard MD on 01/23/2024.  Lab Results   Component Value Date    WBC 6.08 07/25/2023    HGB 15.8 07/25/2023    HCT 48.4 07/25/2023    MCV 95.7 07/25/2023     07/25/2023     Lab Results   Component Value Date    GLUCOSE 96 07/25/2023    BUN 15 07/25/2023    CREATININE 1.05 07/25/2023    EGFR 88.7 07/25/2023    BCR 14.3 07/25/2023    K 4.3 07/25/2023    CO2 25.0 07/25/2023    CALCIUM 9.6 07/25/2023    ALBUMIN 4.4 07/25/2023    BILITOT 0.4 07/25/2023    AST  36 07/25/2023    ALT 48 (H) 07/25/2023     Lab Results   Component Value Date    CHOL 170 07/25/2023    CHLPL 172 08/05/2020    TRIG 86 07/25/2023    HDL 56 07/25/2023    LDL 98 07/25/2023     Lab Results   Component Value Date    TSH 1.870 07/25/2023     Lab Results   Component Value Date    HGBA1C 5.50 07/25/2023     Lab Results   Component Value Date    PSA 0.483 07/25/2023         Lab Results   Component Value Date    MICM48NS 29.0 (L) 07/25/2023               Assessment and Plan:   Diagnoses and all orders for this visit:    1. Overweight (Primary)  Comments:  overall doing well with diet and phentermine, tolerates meds well, will increase phentermine to 37.5 mg daily, he is to work hard on low esau diet and exercise  Orders:  -     phentermine (ADIPEX-P) 37.5 MG tablet; Take 1 tablet by mouth Every Morning Before Breakfast.  Dispense: 30 tablet; Refill: 0    2. Weight loss counseling, encounter for  Comments:  overall doing well with diet and phentermine, tolerates meds well, will increase phentermine to 37.5 mg daily, he is to work hard on low esau diet and exercise  Orders:  -     phentermine (ADIPEX-P) 37.5 MG tablet; Take 1 tablet by mouth Every Morning Before Breakfast.  Dispense: 30 tablet; Refill: 0    3. High risk medication use    4. Seasonal allergic rhinitis due to pollen  Comments:  worse, failed zyrtec, allegra, flonase and local honey  Orders:  -     levocetirizine (XYZAL) 5 MG tablet; Take 1 tablet by mouth Every Evening.  Dispense: 30 tablet; Refill: 2  -     Ambulatory Referral to Allergy    5. Gastroesophageal reflux disease without esophagitis  Comments:  will start him on omeprazole for GERD, f/u if no improvement or worsening symptoms  Orders:  -     omeprazole (priLOSEC) 40 MG capsule; Take 1 capsule by mouth Daily.  Dispense: 30 capsule; Refill: 2                Objective     Medications:  Current Outpatient Medications   Medication Instructions    FLUoxetine (PROZAC) 80 mg, Oral, Every  "Morning    fluticasone (FLONASE) 50 MCG/ACT nasal spray 1 spray, Nasal, Daily    levocetirizine (XYZAL) 5 mg, Oral, Every Evening    omeprazole (PRILOSEC) 40 mg, Oral, Daily    phentermine (ADIPEX-P) 37.5 mg, Oral, Every Morning Before Breakfast        Vital Signs:   /81 (BP Location: Right arm, Patient Position: Sitting)   Pulse 80   Ht 175.3 cm (69.02\")   Wt 107 kg (235 lb)   SpO2 96%   BMI 34.69 kg/m²             Physical Exam:  Physical Exam  Vitals and nursing note reviewed.   Constitutional:       General: He is not in acute distress.     Appearance: Normal appearance. He is obese. He is not ill-appearing, toxic-appearing or diaphoretic.   HENT:      Head: Normocephalic and atraumatic.      Right Ear: Tympanic membrane, ear canal and external ear normal.      Left Ear: Tympanic membrane, ear canal and external ear normal.      Nose: No congestion or rhinorrhea.      Mouth/Throat:      Mouth: Mucous membranes are moist.      Pharynx: Oropharynx is clear. No oropharyngeal exudate or posterior oropharyngeal erythema.   Eyes:      Extraocular Movements: Extraocular movements intact.      Conjunctiva/sclera: Conjunctivae normal.      Pupils: Pupils are equal, round, and reactive to light.   Cardiovascular:      Rate and Rhythm: Normal rate and regular rhythm.      Heart sounds: Normal heart sounds.   Pulmonary:      Effort: Pulmonary effort is normal.      Breath sounds: Normal breath sounds. No wheezing, rhonchi or rales.   Abdominal:      General: Abdomen is flat.      Palpations: Abdomen is soft. There is no mass.      Tenderness: There is no abdominal tenderness.      Hernia: No hernia is present.   Musculoskeletal:      Cervical back: Neck supple. No rigidity.      Right lower leg: No edema.      Left lower leg: No edema.   Lymphadenopathy:      Cervical: No cervical adenopathy.   Skin:     General: Skin is warm and dry.   Neurological:      General: No focal deficit present.      Mental Status: He " is alert and oriented to person, place, and time. Mental status is at baseline.   Psychiatric:         Mood and Affect: Mood normal.         Behavior: Behavior normal.         Thought Content: Thought content normal.         Judgment: Judgment normal.           Review of Systems:  Review of Systems   Constitutional:  Positive for unexpected weight loss. Negative for chills and fever.   HENT:  Negative for congestion, ear discharge and sore throat.    Respiratory:  Negative for shortness of breath.    Cardiovascular:  Negative for chest pain.   Gastrointestinal:  Positive for GERD. Negative for abdominal pain, constipation, diarrhea, nausea and vomiting.   Genitourinary:  Negative for flank pain.   Neurological:  Positive for headache. Negative for dizziness.   Psychiatric/Behavioral:  Positive for sleep disturbance and depressed mood. Negative for suicidal ideas. The patient is nervous/anxious.               Follow Up   Return in about 1 month (around 4/12/2024) for Recheck.    Part of this note may be an electronic transcription/translation of spoken language to printed   text using the Dragon Dictation System.            Medical History:  Medications Discontinued During This Encounter   Medication Reason    phentermine (ADIPEX-P) 37.5 MG tablet     cetirizine (zyrTEC) 10 MG tablet *Therapy completed    fexofenadine (Allegra Allergy) 180 MG tablet *Therapy completed        Past Medical History:    Anxiety    Back pain    Depression    Head injury    Lumbar herniated disc    Lumbosacral disc disease    Obesity    Obstructive sleep apnea    C-PAP mask not working/ not currently treating    Panic disorder    PTSD (post-traumatic stress disorder)    Work related injury    reinjured 8-13-15     Past Surgical History:    LUMBAR DECOMPRESSION    [facility]  L4-5 lumbar decomp disckectomy and TLIF  sextant mtrix and stealth    LUMBAR FUSION    Procedure: RT L4-5 LUMBAR DECOMPRESSION DISCECTOMY AND TLIF SEXTANT METRIX AND STEALTH;   Surgeon: Celestino Mackay MD;  Location: [facility] MAIN OR;  Service:     PLANTAR FASCIA RELEASE    Procedure: LEFT FOOT ENDOSCOPIC PLANTAR FASCIOTOMY;  Surgeon: Rafy Bauer DPM;  Location: [facility] MAIN OR;  Service: Podiatry;  Laterality: Left;    SPINE SURGERY    lumbar spinal diskectomy L4/L5  L5/S1 2009    WISDOM TOOTH EXTRACTION      Family History   Problem Relation Age of Onset    Diabetes Father      Social History     Tobacco Use    Smoking status: Never    Smokeless tobacco: Never   Substance Use Topics    Alcohol use: No       Health Maintenance Due   Topic Date Due    COLORECTAL CANCER SCREENING  Never done        Immunization History   Administered Date(s) Administered    COVID-19 (PFIZER) Purple Cap Monovalent 04/02/2021, 04/28/2021    COVID-19 F23 (PFIZER) 12YRS+ (COMIRNATY) 01/23/2024    Flu Vaccine Intradermal Quad 18-64YR 10/19/2021    Influenza Injectable Mdck Pf Quad 10/19/2021, 10/25/2022, 09/28/2023    Influenza, Unspecified 10/25/2022    Tdap 07/25/2023       No Known Allergies   Weight Loss  Pertinent negatives include no abdominal pain, chest pain, chills, congestion, fever, nausea, sore throat or vomiting.

## 2024-03-13 RX ORDER — LEVOCETIRIZINE DIHYDROCHLORIDE 5 MG/1
5 TABLET, FILM COATED ORAL EVERY EVENING
Qty: 30 TABLET | Refills: 2 | Status: SHIPPED | OUTPATIENT
Start: 2024-03-13

## 2024-03-13 RX ORDER — PHENTERMINE HYDROCHLORIDE 37.5 MG/1
37.5 TABLET ORAL
Qty: 30 TABLET | Refills: 0 | Status: SHIPPED | OUTPATIENT
Start: 2024-03-13

## 2024-03-13 RX ORDER — OMEPRAZOLE 40 MG/1
40 CAPSULE, DELAYED RELEASE ORAL DAILY
Qty: 30 CAPSULE | Refills: 2 | Status: SHIPPED | OUTPATIENT
Start: 2024-03-13

## 2024-03-29 ENCOUNTER — TELEPHONE (OUTPATIENT)
Dept: FAMILY MEDICINE CLINIC | Age: 49
End: 2024-03-29
Payer: COMMERCIAL

## 2024-03-29 NOTE — TELEPHONE ENCOUNTER
Pt dropped off [facility] paperwork to be completed. The $20 fee was collected. He stated that they were supposed to start on 6/3. Paperwork was attached to this encounter and placed in [place]'s mailbox.

## 2024-03-29 NOTE — TELEPHONE ENCOUNTER
Patient is requesting FMLA (2 days per month) so he can attend monthly appointments with you and to see Archana Macario as scheduled. Ok to fill out?

## 2024-04-15 ENCOUNTER — OFFICE VISIT (OUTPATIENT)
Dept: FAMILY MEDICINE CLINIC | Age: 49
End: 2024-04-15
Payer: COMMERCIAL

## 2024-04-15 VITALS
DIASTOLIC BLOOD PRESSURE: 91 MMHG | BODY MASS INDEX: 34.07 KG/M2 | WEIGHT: 230 LBS | SYSTOLIC BLOOD PRESSURE: 124 MMHG | HEART RATE: 81 BPM | OXYGEN SATURATION: 96 % | TEMPERATURE: 97.9 F | HEIGHT: 69 IN

## 2024-04-15 DIAGNOSIS — J30.9 ALLERGIC RHINITIS, UNSPECIFIED SEASONALITY, UNSPECIFIED TRIGGER: ICD-10-CM

## 2024-04-15 DIAGNOSIS — E66.3 OVERWEIGHT: ICD-10-CM

## 2024-04-15 DIAGNOSIS — K21.9 GASTROESOPHAGEAL REFLUX DISEASE WITHOUT ESOPHAGITIS: ICD-10-CM

## 2024-04-15 DIAGNOSIS — Z12.11 SCREEN FOR COLON CANCER: ICD-10-CM

## 2024-04-15 DIAGNOSIS — G47.33 OBSTRUCTIVE SLEEP APNEA SYNDROME: ICD-10-CM

## 2024-04-15 DIAGNOSIS — Z71.3 WEIGHT LOSS COUNSELING, ENCOUNTER FOR: ICD-10-CM

## 2024-04-15 DIAGNOSIS — F41.9 ANXIETY: ICD-10-CM

## 2024-04-15 DIAGNOSIS — Z79.899 LONG-TERM USE OF HIGH-RISK MEDICATION: Primary | ICD-10-CM

## 2024-04-15 LAB
AMPHET+METHAMPHET UR QL: POSITIVE
AMPHETAMINES UR QL: NEGATIVE
BARBITURATES UR QL SCN: NEGATIVE
BENZODIAZ UR QL SCN: NEGATIVE
BUPRENORPHINE SERPL-MCNC: NEGATIVE NG/ML
CANNABINOIDS SERPL QL: NEGATIVE
COCAINE UR QL: NEGATIVE
EXPIRATION DATE: ABNORMAL
Lab: ABNORMAL
MDMA UR QL SCN: NEGATIVE
METHADONE UR QL SCN: NEGATIVE
OPIATES UR QL: NEGATIVE
OXYCODONE UR QL SCN: NEGATIVE
PCP UR QL SCN: NEGATIVE

## 2024-04-15 PROCEDURE — 80305 DRUG TEST PRSMV DIR OPT OBS: CPT | Performed by: FAMILY MEDICINE

## 2024-04-15 PROCEDURE — 99214 OFFICE O/P EST MOD 30 MIN: CPT | Performed by: FAMILY MEDICINE

## 2024-04-15 RX ORDER — PHENTERMINE HYDROCHLORIDE 37.5 MG/1
37.5 TABLET ORAL
Qty: 30 TABLET | Refills: 0 | Status: SHIPPED | OUTPATIENT
Start: 2024-04-15

## 2024-04-15 NOTE — PROGRESS NOTES
Pranav Calderon presents to [facility].    Chief Complaint: Follow-up for phentermine refill    Subjective     History of Present Illness:  Obesity  Associated symptoms include congestion. Pertinent negatives include no abdominal pain, chest pain, chills, fatigue, fever, nausea, sore throat or vomiting.     Chronic issues with ongoing allergies, especially nasal congestion and rhinorrhea.  Did not tolerate Zyrtec.  Flonase helps a little bit.  He is now seeing an allergy specialist and has been changed to Xyzal.  If this is not effective with the Flonase then he will need to start allergy shots.  I discussed with him today doing local honey and he has done this and did not tolerate it.     He does not tolerate CPAP or BiPAP for his chronic sleep apnea and he defers f/u with sleep specialist, is imperative that he loses weight so hopefully with the phentermine, low calorie diet and daily exercise he will lose weight and this will also help his sleep apnea.  He defers the inspire device at this time      Chronic anxiety and depression remain stable on prozac, he states most of his issues relate to the stress of his obesity, he desperately wants to lose weight, no recent panic attacks.  As noted below he is working on weight loss.  He doesn't sleep well due to sleep apnea.  Denies SI/HI     He has lost 11#s total on phentermine, he is tolerating medication well.  He is also working on healthy low-carb diet.  I educated him again on a healthy diet.  He is not exercising like he should be so I have encouraged him to join a gym and start exercising at least 30 minutes 5 days a week.  He did have dry mouth with the phentermine this is with resolved.  He denies chest pain or shortness of air.     GERD is much better with the addition of Prilosec    Result Review   The following data was reviewed by Fauzia Ballard MD on 04/15/2024.  Lab Results   Component Value Date    WBC 6.08  07/25/2023    HGB 15.8 07/25/2023    HCT 48.4 07/25/2023    MCV 95.7 07/25/2023     07/25/2023     Lab Results   Component Value Date    GLUCOSE 96 07/25/2023    BUN 15 07/25/2023    CREATININE 1.05 07/25/2023    EGFR 88.7 07/25/2023    BCR 14.3 07/25/2023    K 4.3 07/25/2023    CO2 25.0 07/25/2023    CALCIUM 9.6 07/25/2023    ALBUMIN 4.4 07/25/2023    BILITOT 0.4 07/25/2023    AST 36 07/25/2023    ALT 48 (H) 07/25/2023     Lab Results   Component Value Date    CHOL 170 07/25/2023    CHLPL 172 08/05/2020    TRIG 86 07/25/2023    HDL 56 07/25/2023    LDL 98 07/25/2023     Lab Results   Component Value Date    TSH 1.870 07/25/2023     Lab Results   Component Value Date    HGBA1C 5.50 07/25/2023     Lab Results   Component Value Date    PSA 0.483 07/25/2023         Lab Results   Component Value Date    AECD51GK 29.0 (L) 07/25/2023               Assessment and Plan:   Diagnoses and all orders for this visit:    1. Long-term use of high-risk medication (Primary)  Comments:  Phentermine refilled, he tolerates med well.  No SI/SX diversion or abuse.  Urine tox and consent form are up-to-date  Orders:  -     POC Urine Drug Screen [facility]    2. Weight loss counseling, encounter for  Comments:  overall doing well with diet and phentermine, tolerates meds well, will refill phentermine 37.5 mg daily, he is to work hard on low esau diet and exercise  Orders:  -     phentermine (ADIPEX-P) 37.5 MG tablet; Take 1 tablet by mouth Every Morning Before Breakfast.  Dispense: 30 tablet; Refill: 0    3. Overweight  Comments:  overall doing well with diet and phentermine, tolerates meds well, will refill phentermine 37.5 mg daily, he is to work hard on low esau diet and exercise  Orders:  -     phentermine (ADIPEX-P) 37.5 MG tablet; Take 1 tablet by mouth Every Morning Before Breakfast.  Dispense: 30 tablet; Refill: 0    4. Screen for colon cancer  Comments:  Screening colonoscopy ordered  Orders:  -     Ambulatory Referral  "For Screening Colonoscopy    5. Allergic rhinitis  Comments:  Seen allergy specialist and was changed from Allegra to Xyzal, continue Flonase nasal spray, if no improvement he may need allergy shots again    6. Gastroesophageal reflux disease without esophagitis  Comments:  Improved with Prilosec    7. Anxiety  Comments:  Stable on Prozac    8. Obstructive sleep apnea syndrome  Comments:  He does not tolerate CPAP or BiPAP.  Will work on low calorie healthy diet for weight loss              Objective     Medications:  Current Outpatient Medications   Medication Instructions    FLUoxetine (PROZAC) 80 mg, Oral, Every Morning    fluticasone (FLONASE) 50 MCG/ACT nasal spray 1 spray, Nasal, Daily    levocetirizine (XYZAL) 5 mg, Oral, Every Evening    omeprazole (PRILOSEC) 40 mg, Oral, Daily    phentermine (ADIPEX-P) 37.5 mg, Oral, Every Morning Before Breakfast        Vital Signs:   /91 (BP Location: Left arm, Patient Position: Sitting)   Pulse 81   Temp 97.9 °F (36.6 °C) (Oral)   Ht 175.3 cm (69.02\")   Wt 104 kg (230 lb)   SpO2 96%   BMI 33.95 kg/m²             Physical Exam:  Physical Exam  Vitals and nursing note reviewed.   Constitutional:       General: He is not in acute distress.     Appearance: Normal appearance. He is not ill-appearing, toxic-appearing or diaphoretic.   HENT:      Head: Normocephalic and atraumatic.      Nose: No congestion or rhinorrhea.      Mouth/Throat:      Mouth: Mucous membranes are moist.      Pharynx: Oropharynx is clear. No oropharyngeal exudate or posterior oropharyngeal erythema.   Eyes:      Extraocular Movements: Extraocular movements intact.      Conjunctiva/sclera: Conjunctivae normal.      Pupils: Pupils are equal, round, and reactive to light.   Cardiovascular:      Rate and Rhythm: Normal rate and regular rhythm.      Heart sounds: Normal heart sounds.   Pulmonary:      Effort: Pulmonary effort is normal.      Breath sounds: Normal breath sounds. No wheezing, " rhonchi or rales.   Musculoskeletal:      Cervical back: Neck supple. No rigidity.      Right lower leg: No edema.      Left lower leg: No edema.   Lymphadenopathy:      Cervical: No cervical adenopathy.   Skin:     General: Skin is warm and dry.   Neurological:      General: No focal deficit present.      Mental Status: He is alert and oriented to person, place, and time. Mental status is at baseline.   Psychiatric:         Mood and Affect: Mood normal.         Behavior: Behavior normal.         Thought Content: Thought content normal.         Judgment: Judgment normal.           Review of Systems:  Review of Systems   Constitutional:  Negative for chills, fatigue and fever.   HENT:  Positive for congestion, postnasal drip and rhinorrhea. Negative for ear discharge and sore throat.    Respiratory:  Negative for shortness of breath.    Cardiovascular:  Negative for chest pain.   Gastrointestinal:  Negative for abdominal pain, constipation, diarrhea, nausea, vomiting and GERD.   Genitourinary:  Negative for flank pain.   Neurological:  Negative for dizziness and headache.   Psychiatric/Behavioral:  Negative for depressed mood.               Follow Up   Return in about 1 month (around 5/15/2024) for Recheck.    Part of this note may be an electronic transcription/translation of spoken language to printed   text using the Dragon Dictation System.            Medical History:  Medications Discontinued During This Encounter   Medication Reason    phentermine (ADIPEX-P) 37.5 MG tablet Reorder      Past Medical History:    Anxiety    Back pain    Depression    Head injury    Lumbar herniated disc    Lumbosacral disc disease    Obesity    Obstructive sleep apnea    C-PAP mask not working/ not currently treating    Panic disorder    PTSD (post-traumatic stress disorder)    Work related injury    reinjured 8-13-15     Past Surgical History:    LUMBAR DECOMPRESSION    [facility]  L4-5 lumbar decomp disckectomy and TLIF  sextant mtrix  and stealth    LUMBAR FUSION    Procedure: RT L4-5 LUMBAR DECOMPRESSION DISCECTOMY AND TLIF SEXTANT METRIX AND STEALTH;  Surgeon: Celestino Mackay MD;  Location: [facility] MAIN OR;  Service:     PLANTAR FASCIA RELEASE    Procedure: LEFT FOOT ENDOSCOPIC PLANTAR FASCIOTOMY;  Surgeon: Rafy Bauer DPM;  Location: [facility] MAIN OR;  Service: Podiatry;  Laterality: Left;    SPINE SURGERY    lumbar spinal diskectomy L4/L5  L5/S1 2009    WISDOM TOOTH EXTRACTION      Family History   Problem Relation Age of Onset    Diabetes Father      Social History     Tobacco Use    Smoking status: Never    Smokeless tobacco: Never   Substance Use Topics    Alcohol use: No       Health Maintenance Due   Topic Date Due    COLORECTAL CANCER SCREENING  Never done        Immunization History   Administered Date(s) Administered    COVID-19 (PFIZER) Purple Cap Monovalent 04/02/2021, 04/28/2021    COVID-19 F23 (PFIZER) 12YRS+ (COMIRNATY) 01/23/2024    Flu Vaccine Intradermal Quad 18-64YR 10/19/2021    Influenza Injectable Mdck Pf Quad 10/19/2021, 10/25/2022, 09/28/2023    Influenza, Unspecified 10/25/2022    Tdap 07/25/2023       No Known Allergies

## 2024-05-13 ENCOUNTER — OFFICE VISIT (OUTPATIENT)
Dept: FAMILY MEDICINE CLINIC | Age: 49
End: 2024-05-13
Payer: COMMERCIAL

## 2024-05-13 VITALS
BODY MASS INDEX: 33.38 KG/M2 | DIASTOLIC BLOOD PRESSURE: 87 MMHG | HEART RATE: 91 BPM | WEIGHT: 225.4 LBS | HEIGHT: 69 IN | SYSTOLIC BLOOD PRESSURE: 120 MMHG | OXYGEN SATURATION: 97 %

## 2024-05-13 DIAGNOSIS — E66.3 OVERWEIGHT: Primary | ICD-10-CM

## 2024-05-13 DIAGNOSIS — K21.9 GASTROESOPHAGEAL REFLUX DISEASE WITHOUT ESOPHAGITIS: ICD-10-CM

## 2024-05-13 DIAGNOSIS — Z12.11 SCREEN FOR COLON CANCER: ICD-10-CM

## 2024-05-13 DIAGNOSIS — Z71.3 WEIGHT LOSS COUNSELING, ENCOUNTER FOR: ICD-10-CM

## 2024-05-13 PROCEDURE — 99214 OFFICE O/P EST MOD 30 MIN: CPT | Performed by: FAMILY MEDICINE

## 2024-05-13 RX ORDER — PHENTERMINE HYDROCHLORIDE 37.5 MG/1
37.5 TABLET ORAL
Qty: 30 TABLET | Refills: 0 | Status: CANCELLED | OUTPATIENT
Start: 2024-05-13

## 2024-05-13 RX ORDER — PHENTERMINE HYDROCHLORIDE 37.5 MG/1
37.5 TABLET ORAL
Qty: 30 TABLET | Refills: 0 | Status: SHIPPED | OUTPATIENT
Start: 2024-05-13

## 2024-05-13 NOTE — PROGRESS NOTES
Pranav Calderon presents to [facility].    Chief Complaint: Follow-up for phentermine refill    Subjective     History of Present Illness:  Obesity  Associated symptoms include congestion. Pertinent negatives include no abdominal pain, chest pain, chills, fatigue, fever, nausea, sore throat or vomiting.     Chronic issues with ongoing allergies, especially nasal congestion and rhinorrhea.  Did not tolerate Zyrtec.  Flonase helps a little bit.  He is now seeing an allergy specialist and has been changed to Xyzal.  If this is not effective with the Flonase then he will need to start allergy shots.  I discussed with him today doing local honey and he has done this and did not tolerate it.     He does not tolerate CPAP or BiPAP for his chronic sleep apnea and he defers f/u with sleep specialist, is imperative that he loses weight so hopefully with the phentermine, low calorie diet and daily exercise he will lose weight and this will also help his sleep apnea.  He defers the inspire device at this time      Chronic anxiety and depression remain stable on prozac, he states most of his issues relate to the stress of his obesity, he desperately wants to lose weight, no recent panic attacks.  As noted below he is working on weight loss.  He doesn't sleep well due to sleep apnea.  Denies SI/HI     He has lost 11#s total on phentermine, he is tolerating medication well.  He is also working on healthy low-carb diet.  I educated him again on a healthy diet.  He is not exercising like he should be so I have encouraged him to join a gym and start exercising at least 30 minutes 5 days a week.  He did have dry mouth with the phentermine this is with resolved.  He denies chest pain or shortness of air.     GERD is much better with the addition of Prilosec    Result Review   The following data was reviewed by Fauzia Ballard MD on 04/15/2024.  Lab Results   Component Value Date    WBC 6.08  07/25/2023    HGB 15.8 07/25/2023    HCT 48.4 07/25/2023    MCV 95.7 07/25/2023     07/25/2023     Lab Results   Component Value Date    GLUCOSE 96 07/25/2023    BUN 15 07/25/2023    CREATININE 1.05 07/25/2023    EGFR 88.7 07/25/2023    BCR 14.3 07/25/2023    K 4.3 07/25/2023    CO2 25.0 07/25/2023    CALCIUM 9.6 07/25/2023    ALBUMIN 4.4 07/25/2023    BILITOT 0.4 07/25/2023    AST 36 07/25/2023    ALT 48 (H) 07/25/2023     Lab Results   Component Value Date    CHOL 170 07/25/2023    CHLPL 172 08/05/2020    TRIG 86 07/25/2023    HDL 56 07/25/2023    LDL 98 07/25/2023     Lab Results   Component Value Date    TSH 1.870 07/25/2023     Lab Results   Component Value Date    HGBA1C 5.50 07/25/2023     Lab Results   Component Value Date    PSA 0.483 07/25/2023         Lab Results   Component Value Date    VPRL94BZ 29.0 (L) 07/25/2023               Assessment and Plan:   Diagnoses and all orders for this visit:    1. Overweight (Primary)  Comments:  overall doing well with diet and phentermine, tolerates meds well, will refill phentermine 37.5 mg daily, he is to work hard on low esau diet and exercise  Orders:  -     phentermine (ADIPEX-P) 37.5 MG tablet; Take 1 tablet by mouth Every Morning Before Breakfast.  Dispense: 30 tablet; Refill: 0    2. Weight loss counseling, encounter for  Comments:  overall doing well with diet and phentermine, tolerates meds well, will refill phentermine 37.5 mg daily, he is to work hard on low esau diet and exercise  Orders:  -     phentermine (ADIPEX-P) 37.5 MG tablet; Take 1 tablet by mouth Every Morning Before Breakfast.  Dispense: 30 tablet; Refill: 0    3. Gastroesophageal reflux disease without esophagitis  Comments:  Worst, on Prilosec 40 mg daily, recommend he avoid acidic and spicy foods and we will set him up for an EGD for further eval  Orders:  -     Ambulatory referral for Screening EGD    4. Screen for colon cancer  Comments:  He is due for screening colonoscopy.  Referral  "placed              Objective     Medications:  Current Outpatient Medications   Medication Instructions    FLUoxetine (PROZAC) 80 mg, Oral, Every Morning    fluticasone (FLONASE) 50 MCG/ACT nasal spray 1 spray, Nasal, Daily    omeprazole (PRILOSEC) 40 mg, Oral, Daily    phentermine (ADIPEX-P) 37.5 mg, Oral, Every Morning Before Breakfast        Vital Signs:   /87 (BP Location: Right arm, Patient Position: Sitting, Cuff Size: Adult)   Pulse 91   Ht 175.3 cm (69.02\")   Wt 102 kg (225 lb 6.4 oz)   SpO2 97%   BMI 33.27 kg/m²             Physical Exam:  Physical Exam  Vitals and nursing note reviewed.   Constitutional:       General: He is not in acute distress.     Appearance: Normal appearance. He is not ill-appearing, toxic-appearing or diaphoretic.   HENT:      Head: Normocephalic and atraumatic.      Nose: No congestion or rhinorrhea.      Mouth/Throat:      Mouth: Mucous membranes are moist.      Pharynx: Oropharynx is clear. No oropharyngeal exudate or posterior oropharyngeal erythema.   Eyes:      Extraocular Movements: Extraocular movements intact.      Conjunctiva/sclera: Conjunctivae normal.      Pupils: Pupils are equal, round, and reactive to light.   Cardiovascular:      Rate and Rhythm: Normal rate and regular rhythm.      Heart sounds: Normal heart sounds.   Pulmonary:      Effort: Pulmonary effort is normal.      Breath sounds: Normal breath sounds. No wheezing, rhonchi or rales.   Musculoskeletal:      Cervical back: Neck supple. No rigidity.      Right lower leg: No edema.      Left lower leg: No edema.   Lymphadenopathy:      Cervical: No cervical adenopathy.   Skin:     General: Skin is warm and dry.   Neurological:      General: No focal deficit present.      Mental Status: He is alert and oriented to person, place, and time. Mental status is at baseline.   Psychiatric:         Mood and Affect: Mood normal.         Behavior: Behavior normal.         Thought Content: Thought content " normal.         Judgment: Judgment normal.           Review of Systems:  Review of Systems   Constitutional:  Negative for chills, fatigue and fever.   HENT:  Positive for congestion, postnasal drip and rhinorrhea. Negative for ear discharge and sore throat.    Respiratory:  Negative for shortness of breath.    Cardiovascular:  Negative for chest pain.   Gastrointestinal:  Negative for abdominal pain, constipation, diarrhea, nausea, vomiting and GERD.   Genitourinary:  Negative for flank pain.   Neurological:  Negative for dizziness and headache.   Psychiatric/Behavioral:  Negative for depressed mood.               Follow Up   Return in about 1 month (around 6/13/2024) for Recheck.    Part of this note may be an electronic transcription/translation of spoken language to printed   text using the Dragon Dictation System.            Medical History:  Medications Discontinued During This Encounter   Medication Reason    levocetirizine (XYZAL) 5 MG tablet *Therapy completed    phentermine (ADIPEX-P) 37.5 MG tablet Reorder      Past Medical History:    Anxiety    Back pain    Depression    Head injury    Lumbar herniated disc    Lumbosacral disc disease    Obesity    Obstructive sleep apnea    C-PAP mask not working/ not currently treating    Panic disorder    PTSD (post-traumatic stress disorder)    Work related injury    reinjured 8-13-15     Past Surgical History:    LUMBAR DECOMPRESSION    [facility]  L4-5 lumbar decomp disckectomy and TLIF  sextant mtrix and stealth    LUMBAR FUSION    Procedure: RT L4-5 LUMBAR DECOMPRESSION DISCECTOMY AND TLIF SEXTANT METRIX AND STEALTH;  Surgeon: Celestino Mackay MD;  Location: [facility] MAIN OR;  Service:     PLANTAR FASCIA RELEASE    Procedure: LEFT FOOT ENDOSCOPIC PLANTAR FASCIOTOMY;  Surgeon: Rafy Bauer DPM;  Location: [facility] MAIN OR;  Service: Podiatry;  Laterality: Left;    SPINE SURGERY    lumbar spinal diskectomy L4/L5  L5/S1 2009    WISDOM TOOTH EXTRACTION      Family History    Problem Relation Age of Onset    Diabetes Father      Social History     Tobacco Use    Smoking status: Never    Smokeless tobacco: Never   Substance Use Topics    Alcohol use: No       Health Maintenance Due   Topic Date Due    COLORECTAL CANCER SCREENING  Never done        Immunization History   Administered Date(s) Administered    COVID-19 (PFIZER) Purple Cap Monovalent 04/02/2021, 04/28/2021    COVID-19 F23 (PFIZER) 12YRS+ (COMIRNATY) 01/23/2024    Flu Vaccine Intradermal Quad 18-64YR 10/19/2021    Influenza Injectable Mdck Pf Quad 10/19/2021, 10/25/2022, 09/28/2023    Influenza, Unspecified 10/25/2022    Tdap 07/25/2023       No Known Allergies

## 2024-05-13 NOTE — PROGRESS NOTES
Sweat her challenge and that her challenge Pranav Calderon presents to [facility].    Chief Complaint: Weight loss, worsening GERD    Subjective     History of Present Illness:  HPI    I am seeing Pranav today to follow-up for his weight loss program. he has lost 5 more pounds he is lost 5 more pounds this month.  He is exercising every day and eating a healthy low calorie diet.  He tolerates the phentermine.  He is lost 15 pounds so far since January 30.    His chronic allergies with nasal congestion and rhinorrhea are currently stable with as needed OTC Xyzal and Flonase nasal spray.      He does not tolerate CPAP or BiPAP for his chronic sleep apnea and he defers f/u with sleep specialist will continue to encourage him to work hard on his weight loss, low calorie diet and daily exercise he will lose weight and this will also help his sleep apnea.  He defers the inspire device at this time      He presents with chronic anxiety and depression remain stable on prozac, he states most of his issues relate to the stress of his obesity, he desperately wants to lose weight, no recent panic attacks.  As noted below he is working on weight loss.  He doesn't sleep well due to sleep apnea.  Denies SI/HI       GERD is worse, even with addition of Prilosec    Result Review   The following data was reviewed by Fauzia Ballard MD on 05/13/2024.  Lab Results   Component Value Date    WBC 6.08 07/25/2023    HGB 15.8 07/25/2023    HCT 48.4 07/25/2023    MCV 95.7 07/25/2023     07/25/2023     Lab Results   Component Value Date    GLUCOSE 96 07/25/2023    BUN 15 07/25/2023    CREATININE 1.05 07/25/2023    EGFR 88.7 07/25/2023    BCR 14.3 07/25/2023    K 4.3 07/25/2023    CO2 25.0 07/25/2023    CALCIUM 9.6 07/25/2023    ALBUMIN 4.4 07/25/2023    BILITOT 0.4 07/25/2023    AST 36 07/25/2023    ALT 48 (H) 07/25/2023     Lab Results   Component Value Date    CHOL 170 07/25/2023    CHLPL 172  "08/05/2020    TRIG 86 07/25/2023    HDL 56 07/25/2023    LDL 98 07/25/2023     Lab Results   Component Value Date    TSH 1.870 07/25/2023     Lab Results   Component Value Date    HGBA1C 5.50 07/25/2023     Lab Results   Component Value Date    PSA 0.483 07/25/2023         Lab Results   Component Value Date    [id number] 29.0 (L) 07/25/2023               Assessment and Plan:   Diagnoses and all orders for this visit:    1. Overweight (Primary)  Comments:  overall doing well with diet and phentermine, tolerates meds well, will refill phentermine 37.5 mg daily, he is to work hard on low esau diet and exercise  Orders:  -     phentermine (ADIPEX-P) 37.5 MG tablet; Take 1 tablet by mouth Every Morning Before Breakfast.  Dispense: 30 tablet; Refill: 0    2. Weight loss counseling, encounter for  Comments:  overall doing well with diet and phentermine, tolerates meds well, will refill phentermine 37.5 mg daily, he is to work hard on low esau diet and exercise  Orders:  -     phentermine (ADIPEX-P) 37.5 MG tablet; Take 1 tablet by mouth Every Morning Before Breakfast.  Dispense: 30 tablet; Refill: 0    3. Gastroesophageal reflux disease without esophagitis  Comments:  Worst, on Prilosec 40 mg daily, recommend he avoid acidic and spicy foods and we will set him up for an EGD for further eval  Orders:  -     Ambulatory referral for Screening EGD    4. Screen for colon cancer  Comments:  He is due for screening colonoscopy.  Referral placed              Objective     Medications:  Current Outpatient Medications   Medication Instructions    FLUoxetine (PROZAC) 80 mg, Oral, Every Morning    fluticasone (FLONASE) 50 MCG/ACT nasal spray 1 spray, Nasal, Daily    omeprazole (PRILOSEC) 40 mg, Oral, Daily    phentermine (ADIPEX-P) 37.5 mg, Oral, Every Morning Before Breakfast        Vital Signs:   /87 (BP Location: Right arm, Patient Position: Sitting, Cuff Size: Adult)   Pulse 91   Ht 175.3 cm (69.02\")   Wt 102 kg (225 lb 6.4 " oz)   SpO2 97%   BMI 33.27 kg/m²             Physical Exam:  Physical Exam  Vitals and nursing note reviewed.   Constitutional:       General: He is not in acute distress.     Appearance: Normal appearance. He is not ill-appearing, toxic-appearing or diaphoretic.   HENT:      Head: Normocephalic and atraumatic.      Right Ear: Tympanic membrane, ear canal and external ear normal.      Left Ear: Tympanic membrane, ear canal and external ear normal.      Nose: No congestion or rhinorrhea.      Mouth/Throat:      Mouth: Mucous membranes are moist.      Pharynx: Oropharynx is clear. No oropharyngeal exudate or posterior oropharyngeal erythema.   Eyes:      Extraocular Movements: Extraocular movements intact.      Conjunctiva/sclera: Conjunctivae normal.      Pupils: Pupils are equal, round, and reactive to light.   Cardiovascular:      Rate and Rhythm: Normal rate and regular rhythm.      Heart sounds: Normal heart sounds.   Pulmonary:      Effort: Pulmonary effort is normal.      Breath sounds: Normal breath sounds. No wheezing, rhonchi or rales.   Abdominal:      General: Abdomen is flat.      Palpations: Abdomen is soft. There is no mass.      Tenderness: There is no abdominal tenderness.      Hernia: No hernia is present.   Musculoskeletal:      Cervical back: Neck supple. No rigidity.      Right lower leg: No edema.      Left lower leg: No edema.   Lymphadenopathy:      Cervical: No cervical adenopathy.   Skin:     General: Skin is warm and dry.   Neurological:      General: No focal deficit present.      Mental Status: He is alert and oriented to person, place, and time. Mental status is at baseline.   Psychiatric:         Mood and Affect: Mood normal.         Behavior: Behavior normal.         Thought Content: Thought content normal.         Judgment: Judgment normal.           Review of Systems:  Review of Systems   Constitutional:  Negative for chills and fever.   HENT:  Negative for congestion, ear discharge  and sore throat.    Respiratory:  Negative for cough, shortness of breath and wheezing.    Cardiovascular:  Negative for chest pain, palpitations and leg swelling.   Gastrointestinal:  Negative for abdominal pain, constipation, diarrhea, nausea, vomiting and GERD.   Genitourinary:  Negative for flank pain.   Neurological:  Negative for dizziness and headache.   Psychiatric/Behavioral:  Negative for depressed mood.               Follow Up   Return in about 1 month (around 6/13/2024) for Recheck.    Part of this note may be an electronic transcription/translation of spoken language to printed   text using the Dragon Dictation System.            Medical History:  Medications Discontinued During This Encounter   Medication Reason    levocetirizine (XYZAL) 5 MG tablet *Therapy completed    phentermine (ADIPEX-P) 37.5 MG tablet Reorder      Past Medical History:    Anxiety    Back pain    Depression    Head injury    Lumbar herniated disc    Lumbosacral disc disease    Obesity    Obstructive sleep apnea    C-PAP mask not working/ not currently treating    Panic disorder    PTSD (post-traumatic stress disorder)    Work related injury    reinjured 8-13-15     Past Surgical History:    LUMBAR DECOMPRESSION    JE[facility]  L4-5 lumbar decomp disckectomy and TLIF  sextant mtrix and stealth    LUMBAR FUSION    Procedure: RT L4-5 LUMBAR DECOMPRESSION DISCECTOMY AND TLIF SEXTANT METRIX AND STEALTH;  Surgeon: Celestino Mackay MD;  Location[facility] MAIN OR;  Service:     PLANTAR FASCIA RELEASE    Procedure: LEFT FOOT ENDOSCOPIC PLANTAR FASCIOTOMY;  Surgeon: Rafy Bauer DPM;  Location: [facility] MAIN OR;  Service: Podiatry;  Laterality: Left;    SPINE SURGERY    lumbar spinal diskectomy L4/L5  L5/S1 2009    WISDOM TOOTH EXTRACTION      Family History   Problem Relation Age of Onset    Diabetes Father      Social History     Tobacco Use    Smoking status: Never    Smokeless tobacco: Never   Substance Use Topics    Alcohol use: No        Health Maintenance Due   Topic Date Due    COLORECTAL CANCER SCREENING  Never done        Immunization History   Administered Date(s) Administered    COVID-19 (PFIZER) Purple Cap Monovalent 04/02/2021, 04/28/2021    COVID-19 F23 (PFIZER) 12YRS+ (COMIRNATY) 01/23/2024    Flu Vaccine Intradermal Quad 18-64YR 10/19/2021    Influenza Injectable Mdck Pf Quad 10/19/2021, 10/25/2022, 09/28/2023    Influenza, Unspecified 10/25/2022    Tdap 07/25/2023       No Known Allergies

## 2024-06-01 DIAGNOSIS — F41.1 GENERALIZED ANXIETY DISORDER: ICD-10-CM

## 2024-06-01 DIAGNOSIS — F33.1 MAJOR DEPRESSIVE DISORDER, RECURRENT EPISODE, MODERATE: ICD-10-CM

## 2024-06-03 ENCOUNTER — OFFICE VISIT (OUTPATIENT)
Dept: BEHAVIORAL HEALTH | Facility: CLINIC | Age: 49
End: 2024-06-03
Payer: COMMERCIAL

## 2024-06-03 VITALS
WEIGHT: 223 LBS | HEART RATE: 100 BPM | SYSTOLIC BLOOD PRESSURE: 118 MMHG | DIASTOLIC BLOOD PRESSURE: 82 MMHG | HEIGHT: 69 IN | BODY MASS INDEX: 33.03 KG/M2

## 2024-06-03 DIAGNOSIS — F33.0 MILD EPISODE OF RECURRENT MAJOR DEPRESSIVE DISORDER: ICD-10-CM

## 2024-06-03 DIAGNOSIS — Z79.899 MEDICATION MANAGEMENT: ICD-10-CM

## 2024-06-03 DIAGNOSIS — F41.1 GENERALIZED ANXIETY DISORDER: Primary | ICD-10-CM

## 2024-06-03 PROCEDURE — 99214 OFFICE O/P EST MOD 30 MIN: CPT | Performed by: NURSE PRACTITIONER

## 2024-06-03 RX ORDER — FLUOXETINE HYDROCHLORIDE 40 MG/1
80 CAPSULE ORAL EVERY MORNING
Qty: 180 CAPSULE | Refills: 0 | OUTPATIENT
Start: 2024-06-03

## 2024-06-03 RX ORDER — AZELASTINE HYDROCHLORIDE, FLUTICASONE PROPIONATE 137; 50 UG/1; UG/1
SPRAY, METERED NASAL
COMMUNITY
Start: 2024-05-13

## 2024-06-03 RX ORDER — FLUOXETINE HYDROCHLORIDE 40 MG/1
80 CAPSULE ORAL EVERY MORNING
Qty: 180 CAPSULE | Refills: 1 | Status: SHIPPED | OUTPATIENT
Start: 2024-06-03 | End: 2024-11-30

## 2024-06-03 NOTE — PATIENT INSTRUCTIONS
"1. Should you want to get in touch with your provider, CH[facility] Mcclain, please contact MY Medical Assistant, Meri, directly at [phone].  Recommend saving Meri's direct number in phone as this is the PREFERRED & EASIEST way to get in contact with your provider.  Please leave a voice mail if you do not get an answer and she will return your call within 24 hrs. You will NOT be able to contact provider on [employer], as Behavioral Health Providers are restricted. YOU MUST CALL [phone]  If you need to speak with the on call provider after hours or on weekends, please Contact the [facility] ([phone]) and staff will be able to page the provider on call directly.     2.  In the event you need to cancel an appointment, please notify the office at least 24 hrs prior:   Contact **Meri Medical Assistant at [facility] directly at [phone] or the [facility] ([phone])     3. MEDICATION REFILLS:  PLEASE CALL THE PHARMACY TO REQUEST ALL MEDICATION REFILLS or via [email or web address] TO ENSURE YOU ARE RECEIVING YOUR MEDICATIONS IN A TIMELY MANNER. The pharmacy or [facility] will send this request ELECTRONICALLY to the ordering provider.   IF YOU USE AN AUTOMATED SERVICE AT THE PHARMACY FOR REFILLS AND ARE TOLD THERE ARE \"NO REFILLS REMAINING\"   PLEASE CALL THE PHARMACY & SPEAK TO A LIVE PERSON TO VERIFY IT IS THE MOST UP TO DATE PRESCRIPTION ON FILE.    All new prescriptions will have a different number, therefore, if you were given refills for a medication today or at last visit it will not have the same number as the previous prescription.     4.  In the event you have personal crisis, contact the following crisis numbers: Suicide Prevention Hotline 1-267.488.2038 or *988, [facility] Helpline 6-358-494-SADP; [facility] Emergency Room [phone]; text HELLO to 654404; or 911.  If you feel like harming yourself or others, call 911 right away.  You can call the 981 Suicide and Crisis " "Lifeline at  988   to speak with a counselor at the [facility], or you can connect with one using their online chat  .    5.  Never stop an antidepressant medicine without first talking to a healthcare provider. Suddenly stopping this type of medication can cause withdrawal symptoms.    6.  Counseling and talk therapy  Counseling or therapy teaches you new coping skills and more adaptive ways of thinking about problems. These tools can help you make positive changes. The benefits of counseling often last long after treatment sessions have stopped.    7.   We would appreciate your feedback, please scan the QRS code on the back of your appointment card (or see below) and complete a brief survey.  [place] location is still not available, so please click \"[place]\" location.  Thank you      SPECIFIC RECOMMENDATIONS:     1.      Medications discussed at this encounter:                   -  Refilled Prozac    2.      Psychotherapy recommendations: Continue with current therapist.      3.     Return to clinic: 6 months Monday 11/25/24 at 1140 am     Please arrive at least 15 minutes before your scheduled appointment time to complete check in process.      IF you are scheduled for a [facility] VIDEO visit, YOU MUST COMPLETE THE \"E-CHECK IN\" PROCESS PRIOR TO BEGINNING THE VISIT, YOU WILL NO LONGER RECEIVE A PHONE CALL PRIOR TO ALL VIDEO VISITS; You may still complete the E-Check in for in office visits prior to appointment, you will receive multiple text/email reminders which will direct you further if needed.           "

## 2024-06-03 NOTE — PROGRESS NOTES
"  Subjective   Pranav Calderon is a 49 y.o. male who presents today for follow up    Referring Provider:  No referring provider defined for this encounter.    Chief Complaint:  Depression, anxiety, medication management     Answers submitted by the patient for this visit:  Primary Reason for Visit (Submitted on 5/27/2024)  What is the primary reason for your visit?: Depression  Depression (Submitted on 5/27/2024)  Chief Complaint: Depression  Visit: follow-up  Frequency: constantly  Severity: severe  excessive worry: Yes  insomnia: No  irritability: Yes  malaise/fatigue: No  obsessions: No  hypersomnia: No  difficulty controlling mood: Yes  Medication compliance: %  Side effects: sexual problems  Aggravated by: family issues, social activities, work stress    History of Present Illness:    6/3/24:  Patient presents today in office reports he is somewhat anxious about PCP stopping phentermine for 3 months and will then restart, patient was 241 lb. On 1/23/24, and today is 225 lbs, though patient weighed self at home and was 219 lb this morning without clothes or phone, keys, as he did today.  Patient has been practicing mindfulness regarding food, most recently has stopped eating pasta and pizza, and is now avoiding soda's, drinks more water.  Patient feels his self confidence has improved and see's results. \"The Prozac has been doing great.\"  Patient didn't have 1 time to take last week when they went to [place], and started feeling panic about not being able to find a place to park, loud music worsened anxiety.   Re-weighed patient without cell phone, keys, and shoes: 223.0 lbs. Overall mood has been stable with Prozac.    Depression: Patient complains of depression. He complains of anhedonia, depressed mood, difficulty concentrating, fatigue, and feelings of worthlessness/guilt  Anxiety:  The patient endorses significant symptoms of anxiety including:  mild anxiousness, being easily fatigued, and " "difficulty concentrating or mind going blank which have caused impairment in important areas of daily functioning.      1/30/24:    Answers submitted by the patient for this visit:  Primary Reason for Visit (Submitted on 1/23/2024)  What is the primary reason for your visit?: Other  Other (Submitted on 1/23/2024)  Please describe your symptoms.: Depression, anxiety, PTSD  Have you had these symptoms before?: Yes  How long have you been having these symptoms?: Greater than 2 weeks  Please list any medications you are currently taking for this condition.: Fluoxetine 80mg  Please describe any probable cause for these symptoms. : Work, my past    Patient presents today in office, at last visit Prozac was increased from 60 to 80 mg for which patient reports has been effective, patient found another bottle of the 20 mg, and has been taking 4 of the 20 mg.  Reports less anxiety and depressive symptoms.  Denies side effects since dose increase, sleeping well.     Noted patient started Phentermine per PCP which was dispensed 1/23/24 for weight loss. Patient has decreased soda intake, patient noticing weight loss which has helped with mood improvement.  Patient was also started on Buspar 15 mg twice daily per PCP due to having 2 panic attacks over the last month, which patient has not taken.     Patient was in bed laying flat about to go to sleep and began to have SOA, feeling panic, started shaking.  Patient got out of bed, drank some water and ate a snack and sat on couch to calm down, and was able to recover, \"it lasted no more than 5-10 minutes.\"    Continues to see therapist monthly, which patient finds therapeutic and does not desire biweekly at this time.     12/7/23:    Answers submitted by the patient for this visit:  Primary Reason for Visit (Submitted on 12/5/2023)  What is the primary reason for your visit?: Other  Other (Submitted on 12/5/2023)  Please describe your symptoms.: Medicine check  Have you had these " "symptoms before?: Yes  How long have you been having these symptoms?: Greater than 2 weeks  Please list any medications you are currently taking for this condition.: Fluoxetine 20 MG (x3)  Please describe any probable cause for these symptoms. : Stress anxiety depression PTSD    Patient presents today in office, at last visit patient was reminded to take total dose of Prozac 60 mg at one time in the morning at 11 am as patient has been taking 40 mg at 11 am and 20 mg in the evening 530-6 pm. Which patient admits to adherence. Patient reports Prozac has been helpful in managing PTSD and anxiety, though admits to low energy.  Patient had felt fatigue prior to dose time changes.     Patient has been working more hours, though even while off and rested and while in [place] in 8/2023, felt didn't want to go out to [place] and wanted to stay indoors, though uncertain if \"its just me. Seems I wasn't excited to go anywhere, I was on vacation.\"      Patient no longer having chest pain, with anxiety, able to talk to people easier now.     Patient was able to see therapist since last visit.  Patient has another visit next week, attending monthly.      Patient had work issue in 2/2022 and when patient did have interactions with those individuals would experience physical symptoms of anxiety. Before the Prozac would avoid interactions with that individual, and now able to not be bothered.   In the past while taking Prozac years ago does not recall experiencing anhedonia and fatigue, though symptoms have improved, they could be better.     Depression: Patient complains of depression. He complains of anhedonia, depressed mood, difficulty concentrating, fatigue, and feelings of worthlessness/guilt  Anxiety:  The patient endorses significant symptoms of anxiety including:  anxiousness, being easily fatigued, difficulty concentrating or mind going blank, and irritability which have caused impairment in important areas of daily functioning.  "     8/28/23:    Answers submitted by the patient for this visit:  Primary Reason for Visit (Submitted on 8/21/2023)  What is the primary reason for your visit?: Other  Other (Submitted on 8/21/2023)  Please describe your symptoms.: Anxiety (causing chest pain) PTSD Depression  Have you had these symptoms before?: Yes  How long have you been having these symptoms?: Greater than 2 weeks  Please list any medications you are currently taking for this condition.: Fluoxetine 60mg  Please describe any probable cause for these symptoms. : Work, stress, sleep apnea    Patient presents today in office, at last visit Prozac was increased from 40 to 60 mg and was advised to take later in the day due to patient working 2nd shift and had reported feeling the medication was corbin off and not lasting 12 hrs. Reports improvement in mood, has been taking 40 mg of old supply at 11 am and takes 20 mg while on break at work between 530-6 pm.  Denies side effects, tolerating well.    Patient had some increased stress regarding finances as truck needed repairs. Work is better.  Feels he is able to handle stress at home better than the past.    Patient has scheduled an appointment with therapist 9/13/23.       6/20/23:    Answers submitted by the patient for this visit:  Primary Reason for Visit (Submitted on 6/13/2023)  What is the primary reason for your visit?: Other  Other (Submitted on 6/13/2023)  Please describe your symptoms.: Anxiety  depression  PTSD  Have you had these symptoms before?: Yes  How long have you been having these symptoms?: Greater than 2 weeks  Please list any medications you are currently taking for this condition.: Fluoxetine 40  Please describe any probable cause for these symptoms. : Work, childhood, stress    Patient presents today in office reports having days Prozac is not working due to symptoms of lack of focus, chest discomfort due to anxiety. Though symptoms are not as frequent as with 20 mg  "Prozac.  Has days of being more \"anti-social\" and good days, and will compensate low energy levels with caffeine intake.  \"My brain makes it feel like a bad day, nothing out of the ordinary making it a bad day.\"  Patient works 4p-12 midnight, and takes dose upon awakening at 9-10 am, and around 10-11 pm feels anxiety starts. Since starting Prozac feels needs a nap in the afternoon, though uncertain as patient started 2nd shift 10/2022 as well as Prozac vs KAILASH which is untreated.     Patient was advised to schedule appointment with therapist at last visit, however, has not heard back since leaving a message.     Depression: Patient complains of depression. He complains of anhedonia, depressed mood, difficulty concentrating, fatigue, feelings of worthlessness/guilt, and insomnia  Anxiety:  The patient endorses significant symptoms of anxiety including: excessive anxiety and worry about a number of events or activities for more days than not, restlessness or feeling keyed up, being easily fatigued, difficulty concentrating or mind going blank, irritability, and sleep disturbance which have caused impairment in important areas of daily functioning.      3/21/23:    Answers for HPI/ROS submitted by the patient on 3/14/2023  What is the primary reason for your visit?: Other  Please describe your symptoms.: Depression, anxiety,  ptsd  Have you had these symptoms before?: Yes  How long have you been having these symptoms?: Greater than 2 weeks  Please list any medications you are currently taking for this condition.: Fluoxetine 40mg  Patient presents today in office, at last visit Prozac was increased from 20 to 40 mg for which patient reports \"I am doing well. I don't think my anxiety will go away completely, but it is definitely better, I don't dwell on it.\" Patient reports overall Prozac has been very effective and is pleased with results.  Sleep has improved due to decreased anxiety, able to calm mind.      Patient had " "an appointment in early 1/2023 with therapy which was cancelled by provider and has not received a call to reschedule, which patient has not attempted to reschedule, though plans to do so today.      Patient has felt anxiety prior to going into work, though quickly subsides, and patient feels may be due to the idea of just going into work.  Overall mood has improved in regards to depression.     1/31/23:  Patient presents today in office \"I feel like the problems are still there, but I am dealing with them better, I am not stressing about it as much at home.  Started Prozac at last visit, for which patient feels has helped improve mood.  Patient explains in Nov 2022 had some financial concerns, and was able to refinance both vehicles which lowered monthly payments, and \"I felt like if I didn't have the Prozac I wouldn't have been able to do that.\"      Patient denies SI, though at times has thoughts \"if I was gone they would be happier financially, because of monthly child support.\"  Son is 16 currently. Denies feelings of harming self or others, denies rumination of thoughts or action plan.      Patient takes Prozac later in morning at 11 am-12 noon due to working 2nd shift, though at times will not go to sleep until 3 am.  Patient had difficulty with sleep prior to starting Prozac as patient has KAILASH and is unable to wear the mask.  Recently bought a mouth guard to help with snoring.  Denies gasping for air, though wife says patient has in the past. \"I do snore really loud.\"    Patient continues to see therapist, which is going well.     Depression: Patient complains of depression. He complains of anhedonia, depressed mood, fatigue, feelings of worthlessness/guilt and insomnia    Anxiety:  The patient endorses significant symptoms of anxiety including: excessive anxiety and worry about a number of events or activities for more days than not, being easily fatigued, difficulty concentrating or mind going blank, " "irritability and sleep disturbance which have caused impairment in important areas of daily functioning.        10/18/22:  Patient presents today in office reporting tolerating Lexapro initially, though having difficulty falling asleep, and approximately 1 week ago stopped taking Lexapro and noticed immediately those symptoms resolved.  As now patient is not having difficulty going to sleep.  Patient tried to take lower dose of Hydroxyzine 10 mg and continued to feel groggy the following day.     Patient reports started therapy with Elyssa and has upcoming appointment 10/25/22.     Patient reports switched shifts from 8a-4pm to 4 pm-12 midnight, which started 2 weeks ago, continues to have chest pain with anxiety while driving, sleep has improved, however, depression rates 6 our of 10, and anxiety 8or9 /10.  \"This hasn't been the best year.\"  Difficulty's with employer, finances, son, life stressors.     Patient further explains he was having difficulty sleeping while taking Lexapro at lower dose, though open to possible reason related to anxiety.   Recalls while taking Prozac also took Hydroxyzine at night which suspect feelings of grogginess, \"feeling so crappy was a combination of the two together.\"     9/13/22:  Patient presents today in office reporting  \"Effexor not working for me. Sweating a lot and feel zoned out\" and felt bloated, patient was started on 37.5 mg of Effexor at last appointment and decreased as needed Hydroxyzine from 25 to 10 mg by mouth every 6 hr as needed.  Patient reports stopped taking Effexor XR after almost 2 weeks due to adverse side effects.  Once patient stopped Effexor XR the symptoms resolved.     Patient tried lower dose of Hydroxyzine of 10 mg after stopping Effexor XR for 2 days and continued to feel tired, though not able to get to sleep, patient took off from work to help \"destress and difficulty sleeping.\" Patient has been off for approximately 2 weeks including scheduled " "off days.      Patient was able to schedule appointment with therapist for 9/28/22 at 2 pm.      Patient wife has been driving , although, patient did  son from school, and able to keep mind off of anxiety while talking to son.  School is 5-10 minutes away from patient home.     Patient does suffer from neck pain which worsens with CPAP/BiPap mask despite trying to loosen strap.  Not able to tolerate.    Patient most impairing symptom is the depression and anxiety as patient believes \"insomnia is a product of the anxiety.\" Patient complaint of inability to calm mind down, \"I don't try to think about it, it just hits me.\"       8/9/22:  INITIAL EVAL  Patient presents today in office with a history of anxiety and depression.   Patient began treatment approximately 10-12 yrs ago with PCP's, has never seen a behavioral health provider.  Patient initial demeanor presents as anxious, somewhat guarded, and flat, however, as visit progressed patient was very open with current stressors and feelings.      Depression: Patient complains of depression. He complains of anhedonia, depressed mood, difficulty concentrating, fatigue, feelings of worthlessness/guilt and negative thoughts of feeling family would be better off without him, though denies actual suicidal thoughts.  Patient admits to \"if I was gone everyone would be better off, I would never do it, I just think about it, fleeting throughout different parts of the day.\"  Patient reports while working it is easier, however, endorses to increased anxiety related to work.  \"a lot of the issues stem from work as well.\"  Denies action plan.  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  Patient was then given some acid reflex medications and had a cardiac workup which was negative.  Patient was then started on Prozac 20 mg 4/26/22.      Current manager is different than before, has had past problems with current female supervisor in 12/2020 while helping putting cylinders away, co-worker put in wrong bins but patient name was on the work, however, patient was punished and had told union, \" she had taken me out of the computer and she refused to put me back in the computer, had arguments prior to this incident,\" patient feels retaliation from this supervisor which has weighed on patient as supervisor now giving patient the \"cold shoulder\". And has difficulty with anxiety.  Patient reports past employer at [employer] did not have any problems, however, patient feels he does not share same  interests as fellow employees.  Patient has been at [employer] for 23 yrs, \"and I still feel like an outsider.\"     Patient admits to having feelings of chest pressure and minimal sweating to palms while driving, \"anytime I get an anxious or stressful situation almost,\" patient takes Hydroxyzine 25 mg and feels in zombie state, and 3-4 hrs later will take Buspar 10 mg which causes drowsiness, denies dizziness.  Patient believes anxiety symptoms while driving began after incident earlier this year with the forklift, March 2022.    Patient admits to only taking Buspar as needed, does not need or take " "while off work, and on weekends takes once if working as there is a \"lighter management\" .  Patient reports he is unable to take Prozac at exact same time with Buspar and hydroxyzine due to feelings of \"spacing out\".   Patient taking Prozac every morning which patient states, \"it makes me a little more social.  Not so much while driving to work, it's only 2 min.from my house. 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 \"High traffic area gets my chest going and I get anxious.\"          PHQ-9 Depression Screening  PHQ-9 Total Score: (P) 6 6/3/2024 6     Little interest or pleasure in doing things? (P) 1-->several days   Feeling down, depressed, or hopeless? (P) 1-->several days   Trouble falling or staying asleep, or sleeping too much? (P) 0-->not at all   Feeling tired or having little energy? (P) 1-->several days   Poor appetite or overeating? (P) 0-->not at all   Feeling bad about yourself - or that you are a failure or have let yourself or your family down? (P) 2-->more than half the days   Trouble concentrating on things, such as reading the newspaper or watching television? (P) 1-->several days   Moving or speaking so slowly that other people could have noticed? Or the opposite - being so fidgety or restless that you have been moving around a lot more than usual? (P) 0-->not at all   Thoughts that you would be better off dead, or of hurting yourself in some way? (P) 0-->not at all   PHQ-9 Total Score (P) 6     BEE-7  Feeling nervous, anxious or on edge: (P) Several days  Not being able to stop or control worrying: (P) Several days  Worrying too much about different things: (P) Several days  Trouble Relaxing: (P) Not at all  Being so restless that it is hard to sit still: (P) Not at all  Feeling afraid as if something awful might happen: (P) Several days  Becoming easily annoyed or irritable: (P) Not at all  BEE 7 Total Score: (P) 4  If you " checked any problems, how difficult have these problems made it for you to do your work, take care of things at home, or get along with other people: (P) Somewhat difficult 6/3/2024     Past Surgical History:  Past Surgical History:   Procedure Laterality Date    LUMBAR DECOMPRESSION  03/09/2016    JE  L4-5 lumbar decomp disckectomy and TLIF  sextant mtrix and stealth    LUMBAR FUSION Right 3/9/2016    Procedure: RT L4-5 LUMBAR DECOMPRESSION DISCECTOMY AND TLIF SEXTANT METRIX AND STEALTH;  Surgeon: Celestino Mackay MD;  Location: [facility] MAIN OR;  Service:     PLANTAR FASCIA RELEASE Left 2/9/2021    Procedure: LEFT FOOT ENDOSCOPIC PLANTAR FASCIOTOMY;  Surgeon: Rafy Bauer DPM;  [place] [facility] [place]  Service: Podiatry;  Laterality: Left;    SPINE SURGERY      lumbar spinal diskectomy L4/L5  L5/S1 2009    WISDOM TOOTH EXTRACTION         Problem List:  Patient Active Problem List   Diagnosis    Lumbosacral disc disease    Work related injury    Post-operative state    Decreased sensation    Low back pain    Vitamin D deficiency    Acquired hammer toe of left foot    Cavus deformity of left foot    Hyperkeratosis    Pain in left foot    Plantar fasciitis    Dysthymia    Anxiety    Major depressive disorder with single episode, in partial remission    Seasonal allergic rhinitis due to pollen    Class 2 obesity due to excess calories without serious comorbidity with body mass index (BMI) of 36.0 to 36.9 in adult    Pruritus    Screen for colon cancer    Overweight    Long-term use of high-risk medication    Allergic rhinitis    Gastroesophageal reflux disease without esophagitis       Allergy:   No Known Allergies     Discontinued Medications:  Medications Discontinued During This Encounter   Medication Reason    fluticasone (FLONASE) 50 MCG/ACT nasal spray Alternate therapy    FLUoxetine (PROzac) 40 MG capsule Reorder             Current Medications:   Current Outpatient Medications   Medication Sig Dispense  Refill    Azelastine-Fluticasone 137-50 MCG/ACT suspension USE 1 SPRAY(S) IN EACH NOSTRIL TWICE DAILY      FLUoxetine (PROzac) 40 MG capsule Take 2 capsules by mouth Every Morning for 180 days. Indications: Generalized Anxiety Disorder, Major Depressive Disorder 180 capsule 1    omeprazole (priLOSEC) 40 MG capsule Take 1 capsule by mouth Daily. 30 capsule 2    phentermine (ADIPEX-P) 37.5 MG tablet Take 1 tablet by mouth Every Morning Before Breakfast. 30 tablet 0     No current facility-administered medications for this visit.       Past Medical History:  Past Medical History:   Diagnosis Date    Anxiety     Back pain     Depression     Head injury     Lumbar herniated disc     Lumbosacral disc disease     Obesity     Obstructive sleep apnea     C-PAP mask not working/ not currently treating    Panic disorder     PTSD (post-traumatic stress disorder)     Work related injury 10/30/2014    reinjured 8-13-15       Past Psychiatric History:  Began Treatment: approximately 10 or more years ago with  PCP at the time  Diagnoses:Depression, Anxiety and suspected PTSD  Psychiatrist:Denies  Therapist: 2016 through [facility] comp prior to back surgery  Admission History:Denies  Medication Trials: Xanax and Zoloft initally [id number] with -ineffective; 10-12 yrs ago placed on Prozac-ineffective; Effexor XR 8/9-9/13/22 sweating,zoned out,abd bloating; Buspar 10 mg as needed drowsiness; Hydroxyzine-drowsiness with low dose of 10 mg, next day grogginess ; Lexapro-insomnia after 5 weeks, possible related to anxiety, though self stopped mid Oct.2022    Self Harm: Denies  Suicide Attempts:Denies      Substance Abuse History:   Types:Denies all, including illicit  Withdrawal Symptoms:Denies  Longest Period Sober:Not Applicable   AA: Not applicable     Social History:  Martial Status:  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, 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)  Kids:Yes or If so, how many 1 son  age  18 as of 8/2022  House:Lives in a house   History: Denies  Access to Guns:  No    Social History     Socioeconomic History    Marital status:     Number of children: 1    Highest education level: High school graduate   Tobacco Use    Smoking status: Never    Smokeless tobacco: Never   Vaping Use    Vaping status: Never Used   Substance and Sexual Activity    Alcohol use: No    Drug use: Never    Sexual activity: Yes       Family History:   Suicide Attempts: Denies  Suicide Completions:Denies      Family History   Problem Relation Age of Onset    Diabetes Father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

## 2024-06-12 ENCOUNTER — PREP FOR SURGERY (OUTPATIENT)
Dept: OTHER | Facility: HOSPITAL | Age: 49
End: 2024-06-12
Payer: COMMERCIAL

## 2024-06-12 ENCOUNTER — OFFICE VISIT (OUTPATIENT)
Dept: SURGERY | Facility: CLINIC | Age: 49
End: 2024-06-12
Payer: COMMERCIAL

## 2024-06-12 VITALS
HEART RATE: 88 BPM | WEIGHT: 224 LBS | HEIGHT: 69 IN | DIASTOLIC BLOOD PRESSURE: 85 MMHG | SYSTOLIC BLOOD PRESSURE: 130 MMHG | BODY MASS INDEX: 33.18 KG/M2

## 2024-06-12 DIAGNOSIS — Z12.11 SCREENING FOR MALIGNANT NEOPLASM OF COLON: ICD-10-CM

## 2024-06-12 DIAGNOSIS — K21.9 GASTROESOPHAGEAL REFLUX DISEASE WITHOUT ESOPHAGITIS: Primary | ICD-10-CM

## 2024-06-12 DIAGNOSIS — K21.00 GASTROESOPHAGEAL REFLUX DISEASE WITH ESOPHAGITIS WITHOUT HEMORRHAGE: Primary | ICD-10-CM

## 2024-06-12 DIAGNOSIS — R10.13 EPIGASTRIC PAIN: ICD-10-CM

## 2024-06-12 RX ORDER — PEG-3350, SODIUM SULFATE, SODIUM CHLORIDE, POTASSIUM CHLORIDE, SODIUM ASCORBATE AND ASCORBIC ACID 7.5-2.691G
1000 KIT ORAL ONCE
Qty: 1000 ML | Refills: 0 | Status: SHIPPED | OUTPATIENT
Start: 2024-06-12 | End: 2024-06-12

## 2024-06-12 RX ORDER — SODIUM CHLORIDE 9 MG/ML
40 INJECTION, SOLUTION INTRAVENOUS AS NEEDED
OUTPATIENT
Start: 2024-06-12

## 2024-06-12 RX ORDER — SODIUM CHLORIDE 0.9 % (FLUSH) 0.9 %
3 SYRINGE (ML) INJECTION EVERY 12 HOURS SCHEDULED
OUTPATIENT
Start: 2024-06-12

## 2024-06-12 RX ORDER — SODIUM CHLORIDE 0.9 % (FLUSH) 0.9 %
10 SYRINGE (ML) INJECTION AS NEEDED
OUTPATIENT
Start: 2024-06-12

## 2024-06-12 NOTE — PROGRESS NOTES
Chief Complaint: Colonoscopy and EGD    Subjective      EGD and colonoscopy consultation       History of Present Illness  Pranav Calderon is a 49 y.o. male presents to [facility] for EGD and colonoscopy consultation.    Patient presents today on referral from Dr. Fauzia Ballard for an EGD and colonoscopy consultation.  Patient does report heartburn and indigestion despite taking a PPI.  Patient had been on Protonix for years and was recently switched to omeprazole, he is still with breakthrough symptoms at night.  Patient denies any dysphagia.  Admits to epigastric pain.  Denies any pain before or after eating.  Denies any melena.  Denies any family history of esophageal or stomach cancer.    Patient denies any lower abdominal pain, change in bowel habit, or rectal bleeding.  Denies any family history of colorectal cancer.  No previous colonoscopy.    Patient admits to KAILASH.  He reports he originally was using a BiPAP.  He has stopped using the BiPAP.    Denies any cardiac issues.    Patient admits to taking phentermine.  Objective     Past Medical History:   Diagnosis Date    Anxiety     Back pain     CHF (congestive heart failure) 2022    Not bad at moment, likely result of sleep apnea    Depression     Head injury     Lumbar herniated disc     Lumbosacral disc disease     Obesity     Obstructive sleep apnea     C-PAP mask not working/ not currently treating    Panic disorder     PTSD (post-traumatic stress disorder)     Spinal headache 2001    Had issues with neck pain    Work related injury 10/30/2014    reinjured 8-13-15       Past Surgical History:   Procedure Laterality Date    LUMBAR DECOMPRESSION  03/09/2016    [facility]  L4-5 lumbar decomp disckectomy and TLIF  sextant mtrix and stealth    LUMBAR FUSION Right 03/09/2016    Procedure: RT L4-5 LUMBAR DECOMPRESSION DISCECTOMY AND TLIF SEXTANT METRIX AND STEALTH;  Surgeon: Celestino Mackay MD;  Location: [facility] MAIN OR;  Service:   "   PLANTAR FASCIA RELEASE Left 02/09/2021    Procedure: LEFT FOOT ENDOSCOPIC PLANTAR FASCIOTOMY;  Surgeon: Rafy Bauer DPM;  [place] [facility] [place];  Service: Podiatry;  Laterality: Left;    SPINE SURGERY      lumbar spinal diskectomy L4/L5  L5/S1 2009    WISDOM TOOTH EXTRACTION         Outpatient Medications Marked as Taking for the 6/12/24 encounter (Office Visit) with HudsonRena boyd APRN   Medication Sig Dispense Refill    Azelastine-Fluticasone 137-50 MCG/ACT suspension USE 1 SPRAY(S) IN EACH NOSTRIL TWICE DAILY      FLUoxetine (PROzac) 40 MG capsule Take 2 capsules by mouth Every Morning for 180 days. Indications: Generalized Anxiety Disorder, Major Depressive Disorder 180 capsule 1    omeprazole (priLOSEC) 40 MG capsule Take 1 capsule by mouth Daily. 30 capsule 2    phentermine (ADIPEX-P) 37.5 MG tablet Take 1 tablet by mouth Every Morning Before Breakfast. 30 tablet 0       No Known Allergies     Family History   Problem Relation Age of Onset    Diabetes Father     Depression Son     Mental illness Son        Social History     Socioeconomic History    Marital status:     Number of children: 1    Highest education level: High school graduate   Tobacco Use    Smoking status: Never    Smokeless tobacco: Never   Vaping Use    Vaping status: Never Used   Substance and Sexual Activity    Alcohol use: No    Drug use: Never    Sexual activity: Not Currently     Partners: Female     Birth control/protection: None       Review of Systems   Constitutional:  Negative for chills and fever.   HENT:  Negative for trouble swallowing.    Gastrointestinal:  Positive for GERD and indigestion. Negative for abdominal distention, abdominal pain, anal bleeding, blood in stool, constipation, diarrhea, nausea, rectal pain and vomiting.        Vital Signs:   /85 (BP Location: Left arm, Patient Position: Sitting, Cuff Size: Adult)   Pulse 88   Ht 175.3 cm (69.02\")   Wt 102 kg (224 lb)   BMI 33.06 kg/m²    "   Physical Exam  Vitals and nursing note reviewed.   Constitutional:       General: He is not in acute distress.     Appearance: Normal appearance. He is not ill-appearing.   HENT:      Head: Normocephalic and atraumatic.   Cardiovascular:      Rate and Rhythm: Normal rate.   Pulmonary:      Effort: Pulmonary effort is normal.      Breath sounds: No stridor.   Abdominal:      Palpations: Abdomen is soft.      Tenderness: There is abdominal tenderness. There is no guarding.   Musculoskeletal:         General: No deformity. Normal range of motion.   Skin:     General: Skin is warm and dry.      Coloration: Skin is not jaundiced.   Neurological:      General: No focal deficit present.      Mental Status: He is alert and oriented to person, place, and time.   Psychiatric:         Mood and Affect: Mood normal.         Thought Content: Thought content normal.          Result Review :          []  Laboratory  []  Radiology  []  Pathology  []  Microbiology  []  EKG/Telemetry   []  Cardiology/Vascular   []  Old records  I spent 25 minutes caring for Pranav on this date of service. This time includes time spent by me in the following activities: reviewing tests, obtaining and/or reviewing a separately obtained history, performing a medically appropriate examination and/or evaluation, ordering medications, tests, or procedures, and documenting information in the medical record.     Assessment and Plan    Diagnoses and all orders for this visit:    1. Gastroesophageal reflux disease without esophagitis (Primary)    2. Epigastric pain    3. Screening for malignant neoplasm of colon    Other orders  -     PEG-KCl-NaCl-NaSulf-Na Asc-C (MOVIPREP) 100 g reconstituted solution powder; Take 1,000 mL by mouth 1 (One) Time for 1 dose.  Dispense: 1000 mL; Refill: 0    Hold phentermine starting 7/19/24      Follow Up   Return for Schedule EGD and colonoscopy with Dr. Granger on 7/26/2024 [facility], [place].    Hospital arrival time:  11:00    Possible risks/complications, benefits, and alternatives to surgical or invasive procedures have been explained to patient and/or legal guardian.    Patient has been evaluated and can tolerate anesthesia and/or sedation. Risks, benefits, and alternatives to anesthesia and sedation have been explained to the patient and/or legal guardian. Patient verbalizes understanding and is willing to proceed with the above plan.     Patient was given instructions and counseling regarding his condition or for health maintenance advice. Please see specific information pulled into the AVS if appropriate.

## 2024-06-13 ENCOUNTER — OFFICE VISIT (OUTPATIENT)
Dept: FAMILY MEDICINE CLINIC | Age: 49
End: 2024-06-13
Payer: COMMERCIAL

## 2024-06-13 VITALS
HEART RATE: 88 BPM | SYSTOLIC BLOOD PRESSURE: 122 MMHG | BODY MASS INDEX: 33.07 KG/M2 | HEIGHT: 69 IN | WEIGHT: 223.3 LBS | OXYGEN SATURATION: 97 % | DIASTOLIC BLOOD PRESSURE: 83 MMHG

## 2024-06-13 DIAGNOSIS — Z79.899 LONG-TERM USE OF HIGH-RISK MEDICATION: ICD-10-CM

## 2024-06-13 DIAGNOSIS — K21.9 GASTROESOPHAGEAL REFLUX DISEASE WITHOUT ESOPHAGITIS: ICD-10-CM

## 2024-06-13 DIAGNOSIS — Z12.11 ENCOUNTER FOR SCREENING COLONOSCOPY: ICD-10-CM

## 2024-06-13 DIAGNOSIS — J30.9 ALLERGIC RHINITIS, UNSPECIFIED SEASONALITY, UNSPECIFIED TRIGGER: ICD-10-CM

## 2024-06-13 DIAGNOSIS — F32.4 MAJOR DEPRESSIVE DISORDER WITH SINGLE EPISODE, IN PARTIAL REMISSION: ICD-10-CM

## 2024-06-13 DIAGNOSIS — F41.9 ANXIETY: ICD-10-CM

## 2024-06-13 DIAGNOSIS — E66.3 OVERWEIGHT: Primary | ICD-10-CM

## 2024-06-13 LAB
AMPHET+METHAMPHET UR QL: NEGATIVE
AMPHETAMINES UR QL: NEGATIVE
BARBITURATES UR QL SCN: NEGATIVE
BENZODIAZ UR QL SCN: NEGATIVE
BUPRENORPHINE SERPL-MCNC: NEGATIVE NG/ML
CANNABINOIDS SERPL QL: NEGATIVE
COCAINE UR QL: NEGATIVE
EXPIRATION DATE: NORMAL
Lab: NORMAL
MDMA UR QL SCN: NEGATIVE
METHADONE UR QL SCN: NEGATIVE
MORPHINE/OPIATES SCREEN, URINE: NEGATIVE
OXYCODONE UR QL SCN: NEGATIVE
PCP UR QL SCN: NEGATIVE

## 2024-06-13 PROCEDURE — 99214 OFFICE O/P EST MOD 30 MIN: CPT | Performed by: FAMILY MEDICINE

## 2024-06-13 NOTE — PROGRESS NOTES
Sweat her challenge and that her challenge Pranav Calderon presents to [facility].    Chief Complaint: Weight loss, GERD    Subjective     History of Present Illness:  HPI    I am seeing Pranav today to follow-up for his weight loss program. he has lost 5 more pounds he is lost 5 more pounds this month.  He is exercising every day and eating a healthy low calorie diet.  He tolerates the phentermine.  He is lost 15 pounds so far since January 30.    His chronic allergies with nasal congestion and rhinorrhea are currently stable with as needed OTC Xyzal and Flonase nasal spray.      He does not tolerate CPAP or BiPAP for his chronic sleep apnea and he defers f/u with sleep specialist will continue to encourage him to work hard on his weight loss, low calorie diet and daily exercise he will lose weight and this will also help his sleep apnea.  He defers the inspire device at this time      He presents with chronic anxiety and depression, doing well,  on prozac, he states most of his issues relate to the stress of his obesity, he desperately wants to lose weight, no recent panic attacks.  As noted below he is working on weight loss.  He doesn't sleep well due to sleep apnea.  Denies SI/HI       GERD continues to be bad, she saw her GI specialist who thinks his allergy issues may be GERD and not allergies.  He is on prilosec 40 mg daily and is pending an EGD    Result Review   The following data was reviewed by Fauzia Ballard MD on 05/13/2024.  Lab Results   Component Value Date    WBC 6.08 07/25/2023    HGB 15.8 07/25/2023    HCT 48.4 07/25/2023    MCV 95.7 07/25/2023     07/25/2023     Lab Results   Component Value Date    GLUCOSE 96 07/25/2023    BUN 15 07/25/2023    CREATININE 1.05 07/25/2023    EGFR 88.7 07/25/2023    BCR 14.3 07/25/2023    K 4.3 07/25/2023    CO2 25.0 07/25/2023    CALCIUM 9.6 07/25/2023    ALBUMIN 4.4 07/25/2023    BILITOT 0.4 07/25/2023    AST 36  07/25/2023    ALT 48 (H) 07/25/2023     Lab Results   Component Value Date    CHOL 170 07/25/2023    CHLPL 172 08/05/2020    TRIG 86 07/25/2023    HDL 56 07/25/2023    LDL 98 07/25/2023     Lab Results   Component Value Date    TSH 1.870 07/25/2023     Lab Results   Component Value Date    HGBA1C 5.50 07/25/2023     Lab Results   Component Value Date    PSA 0.483 07/25/2023         Lab Results   Component Value Date    LIPU87XT 29.0 (L) 07/25/2023               Assessment and Plan:   Diagnoses and all orders for this visit:    1. Overweight (Primary)  Comments:  He has completed 3 months of phentermine.  He will take off 3 months and follow-up in 3 months and continue his healthy diet and daily exercise    2. Long-term use of high-risk medication  -     POC Medline 12 Panel Urine Drug Screen    3. Encounter for screening colonoscopy  -     Ambulatory Referral For Screening Colonoscopy    4. Gastroesophageal reflux disease without esophagitis  Comments:  Heartburn is not improved with Prilosec.  He is pending an EGD for further eval with GI specialist    5. Allergic rhinitis  Comments:  Stable on Astelin and Flonase nasal spray.  No changes in current meds or treatment plan    6. Anxiety  Comments:  Stable and well-controlled on Prozac.  No changes needed current meds or Tx plan.  Sleeping well.  No SI/HI    7. Major depressive disorder with single episode, in partial remission  Comments:  Stable and well-controlled on Prozac. No changes needed current meds or Tx plan. Sleeping well. No SI/HI              Objective     Medications:  Current Outpatient Medications   Medication Instructions    Azelastine-Fluticasone 137-50 MCG/ACT suspension USE 1 SPRAY(S) IN EACH NOSTRIL TWICE DAILY    FLUoxetine (PROZAC) 80 mg, Oral, Every Morning    omeprazole (PRILOSEC) 40 mg, Oral, Daily    phentermine (ADIPEX-P) 37.5 mg, Oral, Every Morning Before Breakfast        Vital Signs:   /83 (BP Location: Left arm, Patient Position:  "Sitting)   Pulse 88   Ht 175.3 cm (69.02\")   Wt 101 kg (223 lb 4.8 oz)   SpO2 97%   BMI 32.96 kg/m²             Physical Exam:  Physical Exam  Vitals and nursing note reviewed.   Constitutional:       General: He is not in acute distress.     Appearance: Normal appearance. He is not ill-appearing, toxic-appearing or diaphoretic.   HENT:      Head: Normocephalic and atraumatic.      Right Ear: Tympanic membrane, ear canal and external ear normal.      Left Ear: Tympanic membrane, ear canal and external ear normal.      Nose: No congestion or rhinorrhea.      Mouth/Throat:      Mouth: Mucous membranes are moist.      Pharynx: Oropharynx is clear. No oropharyngeal exudate or posterior oropharyngeal erythema.   Eyes:      Extraocular Movements: Extraocular movements intact.      Conjunctiva/sclera: Conjunctivae normal.      Pupils: Pupils are equal, round, and reactive to light.   Cardiovascular:      Rate and Rhythm: Normal rate and regular rhythm.      Heart sounds: Normal heart sounds.   Pulmonary:      Effort: Pulmonary effort is normal.      Breath sounds: Normal breath sounds. No wheezing, rhonchi or rales.   Abdominal:      General: Abdomen is flat. Bowel sounds are normal.      Palpations: Abdomen is soft. There is no mass.      Tenderness: There is no abdominal tenderness.      Hernia: No hernia is present.   Musculoskeletal:      Cervical back: Neck supple. No rigidity.      Right lower leg: No edema.      Left lower leg: No edema.   Lymphadenopathy:      Cervical: No cervical adenopathy.   Skin:     General: Skin is warm and dry.   Neurological:      General: No focal deficit present.      Mental Status: He is alert and oriented to person, place, and time. Mental status is at baseline.   Psychiatric:         Mood and Affect: Mood normal.         Behavior: Behavior normal.         Thought Content: Thought content normal.         Judgment: Judgment normal.           Review of Systems:  Review of Systems "   Constitutional:  Negative for chills and fever.   HENT:  Negative for congestion, ear discharge and sore throat.    Respiratory:  Negative for cough, shortness of breath and wheezing.    Cardiovascular:  Negative for chest pain, palpitations and leg swelling.   Gastrointestinal:  Positive for GERD. Negative for abdominal pain, constipation, diarrhea, nausea and vomiting.   Genitourinary:  Negative for flank pain.   Neurological:  Negative for dizziness and headache.   Psychiatric/Behavioral:  Negative for depressed mood.               Follow Up   Return in about 3 months (around 9/13/2024) for Recheck.    Part of this note may be an electronic transcription/translation of spoken language to printed   text using the Dragon Dictation System.            Medical History:  There are no discontinued medications.     Past Medical History:    Anxiety    Back pain    CHF (congestive heart failure)    Not bad at moment, likely result of sleep apnea    Depression    Head injury    Lumbar herniated disc    Lumbosacral disc disease    Obesity    Obstructive sleep apnea    C-PAP mask not working/ not currently treating    Panic disorder    PTSD (post-traumatic stress disorder)    Spinal headache    Had issues with neck pain    Work related injury    reinjured 8-13-15     Past Surgical History:    LUMBAR DECOMPRESSION    [facility]  L4-5 lumbar decomp disckectomy and TLIF  sextant mtrix and stealth    LUMBAR FUSION    Procedure: RT L4-5 LUMBAR DECOMPRESSION DISCECTOMY AND TLIF SEXTANT METRIX AND STEALTH;  Surgeon: Celestino Mackay MD;  Location: [facility] MAIN OR;  Service:     PLANTAR FASCIA RELEASE    Procedure: LEFT FOOT ENDOSCOPIC PLANTAR FASCIOTOMY;  Surgeon: Rafy Bauer DPM;  Location: [facility] OR;  Service: Podiatry;  Laterality: Left;    SPINE SURGERY    lumbar spinal diskectomy L4/L5  L5/S1 2009    WISDOM TOOTH EXTRACTION      Family History   Problem Relation Age of Onset    Diabetes Father     Depression Son     Mental  illness Son      Social History     Tobacco Use    Smoking status: Never    Smokeless tobacco: Never   Substance Use Topics    Alcohol use: No       Health Maintenance Due   Topic Date Due    COLORECTAL CANCER SCREENING  Never done        Immunization History   Administered Date(s) Administered    COVID-19 (PFIZER) Purple Cap Monovalent 04/02/2021, 04/28/2021    COVID-19 F23 (PFIZER) 12YRS+ (COMIRNATY) 01/23/2024    Flu Vaccine Intradermal Quad 18-64YR 10/19/2021    Influenza Injectable Mdck Pf Quad 10/19/2021, 10/25/2022, 09/28/2023    Influenza, Unspecified 10/25/2022    Tdap 07/25/2023       No Known Allergies

## 2024-07-19 ENCOUNTER — TELEPHONE (OUTPATIENT)
Dept: SURGERY | Facility: CLINIC | Age: 49
End: 2024-07-19
Payer: COMMERCIAL

## 2024-07-19 NOTE — TELEPHONE ENCOUNTER
Patient has been diagnosed with lower respiratory infection and albuterol inhaler. He will be done with antibiotics on Monday. The only symptom he's having is a cough. He wants to know if he is still with a cough will he need to r/s or can he proceed with scope scheduled on 7-26?   # [phone]

## 2024-07-22 NOTE — PRE-PROCEDURE INSTRUCTIONS
Arrival time is 1100. Bring someone over the age of 18 to drive home. Clear liquid diet 7/25. Finish prep 4hrs prior to arrival. Can have water till 0900 and then nothing by mouth, this includes tobacco, candy and gum. Can take meds as normal. Hold phentermine for at least one week, patient states he hasn't had it in almost a month.

## 2024-07-25 ENCOUNTER — ANESTHESIA EVENT (OUTPATIENT)
Dept: GASTROENTEROLOGY | Facility: HOSPITAL | Age: 49
End: 2024-07-25
Payer: COMMERCIAL

## 2024-07-25 NOTE — ANESTHESIA PREPROCEDURE EVALUATION
Anesthesia Evaluation     Patient summary reviewed and Nursing notes reviewed   History of anesthetic complications: spinal headache with neck pain.  NPO Solid Status: > 8 hours  NPO Liquid Status: > 2 hours           Airway   Mallampati: I  TM distance: >3 FB  Neck ROM: full  No difficulty expected  Dental - normal exam     Pulmonary - normal exam   (+) ,sleep apnea    ROS comment: No CPAP  Cardiovascular - normal exam  Exercise tolerance: good (4-7 METS)    ECG reviewed    (+) CHF Diastolic >=55%    ROS comment: CHF related to Covid. Patient is active and has no current issues.     Neuro/Psych  (+) headaches, psychiatric history PTSD, Anxiety and Depression  GI/Hepatic/Renal/Endo    (+) obesity, GERD poorly controlled    Musculoskeletal     (+) back pain  Abdominal  - normal exam   Substance History      OB/GYN          Other        ROS/Med Hx Other: EGD for GERD ( patient states no problems this morning other than belching)    EKG 1/23/2024  SR    ECHO 4/22/2022  1. Normal left ventricular systolic function with estimated LV ejection fraction of 55%.   2. Grade 1 diastolic dysfunction of the left ventricle.   3. No hemodynamically significant valvular abnormalities.     Unable to access stress test dated 5/11/2022                              Anesthesia Plan    ASA 2     general   total IV anesthesia  (Total IV Anesthesia    Patient understands anesthesia not responsible for dental damage.  )  intravenous induction     Anesthetic plan, risks, benefits, and alternatives have been provided, discussed and informed consent has been obtained with: patient.    Plan discussed with CRNA.        CODE STATUS:

## 2024-07-26 ENCOUNTER — HOSPITAL ENCOUNTER (OUTPATIENT)
Facility: HOSPITAL | Age: 49
Setting detail: HOSPITAL OUTPATIENT SURGERY
Discharge: HOME | End: 2024-07-26
Attending: SURGERY | Admitting: SURGERY
Payer: COMMERCIAL

## 2024-07-26 ENCOUNTER — ANESTHESIA (OUTPATIENT)
Dept: GASTROENTEROLOGY | Facility: HOSPITAL | Age: 49
End: 2024-07-26
Payer: COMMERCIAL

## 2024-07-26 VITALS
BODY MASS INDEX: 30.85 KG/M2 | OXYGEN SATURATION: 93 % | SYSTOLIC BLOOD PRESSURE: 108 MMHG | DIASTOLIC BLOOD PRESSURE: 75 MMHG | WEIGHT: 209 LBS | RESPIRATION RATE: 20 BRPM | TEMPERATURE: 97.4 F | HEART RATE: 93 BPM

## 2024-07-26 DIAGNOSIS — R10.13 EPIGASTRIC PAIN: ICD-10-CM

## 2024-07-26 DIAGNOSIS — Z12.11 SCREENING FOR MALIGNANT NEOPLASM OF COLON: ICD-10-CM

## 2024-07-26 DIAGNOSIS — K21.00 GASTROESOPHAGEAL REFLUX DISEASE WITH ESOPHAGITIS WITHOUT HEMORRHAGE: ICD-10-CM

## 2024-07-26 PROCEDURE — 88305 TISSUE EXAM BY PATHOLOGIST: CPT | Performed by: SURGERY

## 2024-07-26 PROCEDURE — 25810000003 LACTATED RINGERS PER 1000 ML: Performed by: NURSE ANESTHETIST, CERTIFIED REGISTERED

## 2024-07-26 PROCEDURE — 25010000002 PROPOFOL 10 MG/ML EMULSION: Performed by: NURSE ANESTHETIST, CERTIFIED REGISTERED

## 2024-07-26 DEVICE — DEV CLIP ENDO RESOLUTION360 CONTRL ROT 235CM: Type: IMPLANTABLE DEVICE | Site: TRANSVERSE COLON | Status: FUNCTIONAL

## 2024-07-26 RX ORDER — SODIUM CHLORIDE, SODIUM LACTATE, POTASSIUM CHLORIDE, CALCIUM CHLORIDE 600; 310; 30; 20 MG/100ML; MG/100ML; MG/100ML; MG/100ML
30 INJECTION, SOLUTION INTRAVENOUS CONTINUOUS
Status: DISCONTINUED | OUTPATIENT
Start: 2024-07-26 | End: 2024-07-26 | Stop reason: HOSPADM

## 2024-07-26 RX ORDER — PROPOFOL 10 MG/ML
VIAL (ML) INTRAVENOUS AS NEEDED
Status: DISCONTINUED | OUTPATIENT
Start: 2024-07-26 | End: 2024-07-26 | Stop reason: SURG

## 2024-07-26 RX ORDER — SODIUM CHLORIDE 9 MG/ML
40 INJECTION, SOLUTION INTRAVENOUS AS NEEDED
Status: DISCONTINUED | OUTPATIENT
Start: 2024-07-26 | End: 2024-07-26 | Stop reason: HOSPADM

## 2024-07-26 RX ORDER — LIDOCAINE HYDROCHLORIDE 20 MG/ML
INJECTION, SOLUTION EPIDURAL; INFILTRATION; INTRACAUDAL; PERINEURAL AS NEEDED
Status: DISCONTINUED | OUTPATIENT
Start: 2024-07-26 | End: 2024-07-26 | Stop reason: SURG

## 2024-07-26 RX ORDER — SODIUM CHLORIDE 0.9 % (FLUSH) 0.9 %
10 SYRINGE (ML) INJECTION AS NEEDED
Status: DISCONTINUED | OUTPATIENT
Start: 2024-07-26 | End: 2024-07-26 | Stop reason: HOSPADM

## 2024-07-26 RX ORDER — SODIUM CHLORIDE 0.9 % (FLUSH) 0.9 %
3 SYRINGE (ML) INJECTION EVERY 12 HOURS SCHEDULED
Status: DISCONTINUED | OUTPATIENT
Start: 2024-07-26 | End: 2024-07-26 | Stop reason: HOSPADM

## 2024-07-26 RX ADMIN — PROPOFOL 100 MG: 10 INJECTION, EMULSION INTRAVENOUS at 12:49

## 2024-07-26 RX ADMIN — PROPOFOL 100 MG: 10 INJECTION, EMULSION INTRAVENOUS at 12:52

## 2024-07-26 RX ADMIN — SODIUM CHLORIDE, POTASSIUM CHLORIDE, SODIUM LACTATE AND CALCIUM CHLORIDE 30 ML/HR: 600; 310; 30; 20 INJECTION, SOLUTION INTRAVENOUS at 10:57

## 2024-07-26 RX ADMIN — PROPOFOL 200 MCG/KG/MIN: 10 INJECTION, EMULSION INTRAVENOUS at 12:49

## 2024-07-26 RX ADMIN — PROPOFOL 100 MG: 10 INJECTION, EMULSION INTRAVENOUS at 12:55

## 2024-07-26 RX ADMIN — LIDOCAINE HYDROCHLORIDE 60 MG: 20 INJECTION, SOLUTION EPIDURAL; INFILTRATION; INTRACAUDAL; PERINEURAL at 12:49

## 2024-07-26 NOTE — H&P
General Surgery/Colorectal Surgery Note    Patient Name:  Pranav Calderon  YOB: 1975  [id number]    Referring Provider: Pranav Granger, *      Patient Care Team:  Fauzia Ballard MD as PCP - General (Family Medicine)  Meri Alex MD as Consulting Physician (Physical Medicine and Rehabilitation)  Celestino Mackay MD as Surgeon (Neurosurgery)  Archana Macario APRN as Nurse Practitioner (Psychiatry)  Meri Perales MA as Medical Assistant    Subjective .     History of present illness:    HPI   referral from Dr. aFuzia Ballard for an EGD and colonoscopy consultation.  Patient does report heartburn and indigestion despite taking a PPI.  Patient had been on Protonix for years and was recently switched to omeprazole, he is still with breakthrough symptoms at night.  Patient denies any dysphagia.  Admits to epigastric pain.  Denies any pain before or after eating.  Denies any melena.  Denies any family history of esophageal or stomach cancer.     Patient denies any lower abdominal pain, change in bowel habit, or rectal bleeding.  Denies any family history of colorectal cancer.  No previous colonoscopy.     Patient admits to KAILASH.  He reports he originally was using a BiPAP.  He has stopped using the BiPAP.     Denies any cardiac issues.     Patient admits to taking phentermine.    History:  Past Medical History:   Diagnosis Date    Anxiety     Back pain     CHF (congestive heart failure) 2022    Not bad at moment, likely result of sleep apnea    Depression     Head injury     Lumbar herniated disc     Lumbosacral disc disease     Obesity     Obstructive sleep apnea     C-PAP mask not working/ not currently treating    Panic disorder     PTSD (post-traumatic stress disorder)     Spinal headache 2001    Had issues with neck pain    Work related injury 10/30/2014    reinjured 8-13-15       Past Surgical History:   Procedure Laterality Date    LUMBAR DECOMPRESSION  03/09/2016    [facility]  L4-5  lumbar decomp disckectomy and TLIF  sextant mtrix and stealth    LUMBAR FUSION Right 03/09/2016    Procedure: RT L4-5 LUMBAR DECOMPRESSION DISCECTOMY AND TLIF SEXTANT METRIX AND STEALTH;  Surgeon: Celestino Mackay MD;  Location[place] [facility];  Service:     PLANTAR FASCIA RELEASE Left 02/09/2021    Procedure: LEFT FOOT ENDOSCOPIC PLANTAR FASCIOTOMY;  Surgeon: Rafy Bauer DPM;  [place] [facility] [place]  Service: Podiatry;  Laterality: Left;    SPINE SURGERY      lumbar spinal diskectomy L4/L5  L5/S1 2009    WISDOM TOOTH EXTRACTION         Family History   Problem Relation Age of Onset    Diabetes Father     Depression Son     Mental illness Son        Social History     Tobacco Use    Smoking status: Never    Smokeless tobacco: Never   Vaping Use    Vaping status: Never Used   Substance Use Topics    Alcohol use: No    Drug use: Never       Review of Systems: See HPI    Review of Systems            Medications Prior to Admission   Medication Sig Dispense Refill Last Dose    Azelastine-Fluticasone 137-50 MCG/ACT suspension USE 1 SPRAY(S) IN EACH NOSTRIL TWICE DAILY   Past Week    FLUoxetine (PROzac) 40 MG capsule Take 2 capsules by mouth Every Morning for 180 days. Indications: Generalized Anxiety Disorder, Major Depressive Disorder 180 capsule 1 Past Week    omeprazole (priLOSEC) 40 MG capsule Take 1 capsule by mouth Daily. 30 capsule 2 Past Month         Home Meds:      Prior to Admission medications    Medication Sig Start Date End Date Taking? Authorizing Provider   Azelastine-Fluticasone 137-50 MCG/ACT suspension USE 1 SPRAY(S) IN EACH NOSTRIL TWICE DAILY 5/13/24  Yes Provider, MD Geraldine   FLUoxetine (PROzac) 40 MG capsule Take 2 capsules by mouth Every Morning for 180 days. Indications: Generalized Anxiety Disorder, Major Depressive Disorder 6/3/24 11/30/24 Yes Archana Macario APRN   omeprazole (priLOSEC) 40 MG capsule Take 1 capsule by mouth Daily. 3/13/24  Yes Fauzia Ballard MD   phentermine  (ADIPEX-P) 37.5 MG tablet Take 1 tablet by mouth Every Morning Before Breakfast. 5/13/24 7/26/24  Fauzia Ballard MD            Allergies:  Patient has no known allergies.      Objective     Vital Signs   Temp:  [97.4 °F (36.3 °C)] 97.4 °F (36.3 °C)  Heart Rate:  [98] 98  Resp:  [16] 16  BP: (120)/(89) 120/89    Physical Exam:     Physical Exam    NAD, A/O x 4, normal circulation, normal respiration      Result Review :     Assessment & Plan     Diagnoses and all orders for this visit:    1. Gastroesophageal reflux disease with esophagitis without hemorrhage  -     sodium chloride 0.9 % flush 3 mL  -     sodium chloride 0.9 % flush 10 mL  -     sodium chloride 0.9 % infusion 40 mL    2. Epigastric pain  -     sodium chloride 0.9 % flush 3 mL  -     sodium chloride 0.9 % flush 10 mL  -     sodium chloride 0.9 % infusion 40 mL    3. Screening for malignant neoplasm of colon  -     sodium chloride 0.9 % flush 3 mL  -     sodium chloride 0.9 % flush 10 mL  -     sodium chloride 0.9 % infusion 40 mL    Other orders  -     Continuous Pulse Oximetry; Standing  -     POC Glucose Once; Standing  -     Insert Peripheral IV; Standing  -     lactated ringers infusion  -     Follow Anesthesia Guidelines / Protocol; Standing  -     Verify Bowel Prep Was Successful; Standing  -     Give Tap Water Enema If Bowel Prep Insufficient; Standing  -     Insert Peripheral IV; Standing  -     Saline Lock & Maintain IV Access; Standing  -     Place Sequential Compression Device; Standing  -     Maintain Sequential Compression Device; Standing  -     Verify NPO Status; Standing  -     Obtain Informed Consent; Standing  -     Continuous Pulse Oximetry  -     POC Glucose Once  -     Insert Peripheral IV  -     Follow Anesthesia Guidelines / Protocol  -     Verify Bowel Prep Was Successful  -     Give Tap Water Enema If Bowel Prep Insufficient  -     Insert Peripheral IV  -     Saline Lock & Maintain IV Access  -     Place Sequential  Compression Device  -     Maintain Sequential Compression Device  -     Verify NPO Status  -     Obtain Informed Consent           Risks including bleeding, perforation, pain, infection, missed polyp(s). Benefits, and alternatives of EGD and Colonoscopy discussed with patient.  All questions answered.  Consent verified.         Pranav Granger MD  07/26/24 11:32 EDT

## 2024-07-26 NOTE — ANESTHESIA POSTPROCEDURE EVALUATION
Patient: Pranav Calderon    Procedure Summary       Date: 07/26/24 Room / Location: [facility] END[facility] 2 / [facility]    Anesthesia Start: 1246 Anesthesia Stop: 1316    Procedures:       COLONOSCOPY WITH POLYPECTOMY      ESOPHAGOGASTRODUODENOSCOPY with biopsies Diagnosis:       Gastroesophageal reflux disease with esophagitis without hemorrhage      Epigastric pain      Screening for malignant neoplasm of colon      (Gastroesophageal reflux disease with esophagitis without hemorrhage [K21.00])      (Epigastric pain [R10.13])      (Screening for malignant neoplasm of colon [Z12.11])    Surgeons: Pranav Granger MD Provider: Kartik Salinas CRNA    Anesthesia Type: general ASA Status: 2            Anesthesia Type: general    Vitals  Vitals Value Taken Time   /75 07/26/24 1345   Temp 36.3 °C (97.4 °F) 07/26/24 1334   Pulse 90 07/26/24 1346   Resp 20 07/26/24 1345   SpO2 93 % 07/26/24 1346   Vitals shown include unfiled device data.        Post Anesthesia Care and Evaluation    Post-procedure mental status: acceptable.  Pain management: satisfactory to patient    Airway patency: patent  Anesthetic complications: No anesthetic complications    Cardiovascular status: acceptable  Respiratory status: acceptable    Comments: Per chart review

## 2024-07-29 ENCOUNTER — TELEPHONE (OUTPATIENT)
Dept: BEHAVIORAL HEALTH | Facility: CLINIC | Age: 49
End: 2024-07-29
Payer: COMMERCIAL

## 2024-07-29 LAB
CYTO UR: NORMAL
LAB AP CASE REPORT: NORMAL
LAB AP CLINICAL INFORMATION: NORMAL
PATH REPORT.FINAL DX SPEC: NORMAL
PATH REPORT.GROSS SPEC: NORMAL

## 2024-07-29 NOTE — TELEPHONE ENCOUNTER
"Received a secure chat message from [place] office reported patient had called to move up his appointment scheduled in Nov. Due to his PCP telling him he needed to be on ADHD medications but could not be prescribed phentermine with an ADHD stimulant medication.  Patient is scheduled for this Friday 8/2/24.  Secure chat message sent to PCP to discuss request after chart review did not indicate a prior discussion with PCP. Per discussion with PCP, patient had not been told to be placed on a medication for ADHD and that she had agreed to prescribe Phentermine for 3 months with a 3 month break between, which patient had mentioned previously at last visit.      Called patient to clarify reason for an earlier visit as ADHD evaluations are scheduled for longer appointment times. Patient reports when he last seen PCP in June, was told he may have an underlying diagnosis of ADHD based on patient feeling more sharp while taking Phentermine.  Patient was informed with any stimulant medication it does effect the brain differently and likely did feel \"sharper\".  Patient does wish to proceed with an ADHD evaluation, patient has to be at work at 330 pm which is near the office.  Next available time for ADHD evaluation is on Friday 8/9/24 at 1440, which patient was offered a note to take to employer as he may be a little late, though patient does not believe that will be needed and agrees to cancel the appointment for this Friday and come in 8/9/24 at 1440.     Updated PCP of patient decision to proceed with ADHD evaluation and instructed MA to change appointment times as indicated.   "

## 2024-08-09 ENCOUNTER — OFFICE VISIT (OUTPATIENT)
Dept: BEHAVIORAL HEALTH | Facility: CLINIC | Age: 49
End: 2024-08-09
Payer: COMMERCIAL

## 2024-08-09 VITALS
HEIGHT: 69 IN | SYSTOLIC BLOOD PRESSURE: 106 MMHG | WEIGHT: 219 LBS | BODY MASS INDEX: 32.44 KG/M2 | DIASTOLIC BLOOD PRESSURE: 78 MMHG | HEART RATE: 88 BPM

## 2024-08-09 DIAGNOSIS — F41.1 GENERALIZED ANXIETY DISORDER: Primary | ICD-10-CM

## 2024-08-09 DIAGNOSIS — Z79.899 MEDICATION MANAGEMENT: ICD-10-CM

## 2024-08-09 DIAGNOSIS — F40.10 SOCIAL ANXIETY DISORDER: ICD-10-CM

## 2024-08-09 DIAGNOSIS — Z13.39 ADHD (ATTENTION DEFICIT HYPERACTIVITY DISORDER) EVALUATION: ICD-10-CM

## 2024-08-09 RX ORDER — ALBUTEROL SULFATE 90 UG/1
AEROSOL, METERED RESPIRATORY (INHALATION)
COMMUNITY
Start: 2024-07-15

## 2024-08-09 RX ORDER — DOXYCYCLINE 100 MG/1
CAPSULE ORAL
COMMUNITY
Start: 2024-07-18

## 2024-08-09 NOTE — PROGRESS NOTES
"  Subjective   Pranav Calderon is a 49 y.o. male who presents today for follow up    Referring Provider:  No referring provider defined for this encounter.    Chief Complaint:  ADHD evaluation, anxiety, medication management     Answers submitted by the patient for this visit:  Primary Reason for Visit (Submitted on 2024)  What is the primary reason for your visit?: Other  Other (Submitted on 2024)  Please describe your symptoms.: Anxiety, depression,  ptsd sometimes causing chest pain, possible adhd.  Have you had these symptoms before?: Yes  How long have you been having these symptoms?: Greater than 2 weeks  Please list any medications you are currently taking for this condition.: Fluoxetine 80mg, An. Flonase      History of Present Illness:    24:  Patient presents today in office for a requested ADHD evaluation.  Patient noticed there were things he rarely noticed previously had subsided while taking Phentermine for weight loss.  \"If anyone reminds me for appointments or wife will remind me and say again \"I can't wait for tomorrow night\" and forgets we had plans.\"  Patient last appointment for therapy was on calendar on phone though phone  and had been using another phone which the calendar information did not transfer and patient had missed the appointment in .  Patient reports upon arriving to work, he would head directly to assigned area, however, while taking Phentermine he was able to be more social, felt less anxious, carry on conversations.  \"I didn't have to think for words to describe things, now I have to reach out to find words I need to use. Things I really didn't notice that badly until I was on it and I felt more myself.\"  Patient recalls while in highschool would click teeth together or tapped pencil to his head, fidgeting in seat when he felt anxious.   \"Didn't over think about bills, teenager stuff and work problems.\"     \"It's more about how I felt on phentermine than to " "have evaluation for ADHD, and I didn't notice I had ADHD until I took it.  I didn't take any naps, I wasn't bothered by things.\"    ADHD:   Elementary school:   Grades: unable to recall    Special classes or failures: no and no  Got in trouble: sometimes couldn't sit still, had hands smacked  Referral for ADHD testing: no  Fhx: son-diagnosed around 7-8th grade, treated with medication-Lamictal and quila chew   Presently:  Problems with attention to detail: No (denies submitting incomplete work, goes back to fix mistakes) describes as: \"while placing barn doors up in home, had to keep going back and getting aligned, while taking phentermine I didn't notice it or I didn't care if I did or didn't go over the yard again because I would have to go back and cut places I missed .\"  Problems with sustained attention: No (Difficulty remaining focused during lengthy meetings over 15-20 minutes.\"I want to go to work\".as sometimes will have meetings and patient wants to get started on his day.   Problems listening when spoken to directly: No (describes situations where he has difficulty listening while preoccupied in another task, if there are no obvious distraction is able to listen.) \"will hear them talking, I can't resonate what they are saying, have to ask them to repeat it. Especially if I am doing something already. 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 Putting it back together, \"at work is not as bad because I am getting paid. \"  Avoids/Dislikes/Reluctant to engage in tasks that require sustained mental effort:No thinking about putting stuff away in room, hasn't put " "larger fitting clothes away since weight loss, able to clean garage due to taking phentermine.  Easily distracted: Yes (noises, TV, phone, external factors)   Forgetting things: No (paying bills-does all on mobile roxanna so its not an issue, keeping appointments-with reminders has no difficulty)   Losing things: Sometimes-has a designated place for keys, sometimes doesn't place in area, mostly keys are misplaced often, wife had found keys in the garage and patient did not recall leaving them in the garage, other items denied.  Hard to organize: Sometimes (admits to difficulty keeping things in order, hasn't been able to adhere to calorie counting roxanna \"I can't keep with it for some reason.\" Denies difficulty managing sequential tasks, poor time management, nor failure to meet deadlines)   Talks a lot and cutting people off: Sometimes, not consistently \"when I get to talking but a lot of times anti-social behavior kicks in;\" and  sometimes  Drifts off during conversations: Sometimes   Difficulty with Reading: \"I'm not into reading\"  Xiii. Difficulty watching TV/Movies: No- recently doesn't finish You tube videos; can sit through movies      6/3/24:    Answers submitted by the patient for this visit:  Primary Reason for Visit (Submitted on 5/27/2024)  What is the primary reason for your visit?: Depression  Depression (Submitted on 5/27/2024)  Chief Complaint: Depression  Visit: follow-up  Frequency: constantly  Severity: severe  excessive worry: Yes  insomnia: No  irritability: Yes  malaise/fatigue: No  obsessions: No  hypersomnia: No  difficulty controlling mood: Yes  Medication compliance: %  Side effects: sexual problems  Aggravated by: family issues, social activities, work stress    Patient presents today in office reports he is somewhat anxious about PCP stopping phentermine for 3 months and will then restart, patient was 241 lb. On 1/23/24, and today is 225 lbs, though patient weighed self at home and was 219 " "lb this morning without clothes or phone, keys, as he did today.  Patient has been practicing mindfulness regarding food, most recently has stopped eating pasta and pizza, and is now avoiding soda's, drinks more water.  Patient feels his self confidence has improved and see's results. \"The Prozac has been doing great.\"  Patient didn't have 1 time to take last week when they went to [place], and started feeling panic about not being able to find a place to park, loud music worsened anxiety.   Re-weighed patient without cell phone, keys, and shoes: 223.0 lbs. Overall mood has been stable with Prozac.    Depression: Patient complains of depression. He complains of anhedonia, depressed mood, difficulty concentrating, fatigue, and feelings of worthlessness/guilt  Anxiety:  The patient endorses significant symptoms of anxiety including:  mild anxiousness, being easily fatigued, and difficulty concentrating or mind going blank which have caused impairment in important areas of daily functioning.      1/30/24:    Answers submitted by the patient for this visit:  Primary Reason for Visit (Submitted on 1/23/2024)  What is the primary reason for your visit?: Other  Other (Submitted on 1/23/2024)  Please describe your symptoms.: Depression, anxiety, PTSD  Have you had these symptoms before?: Yes  How long have you been having these symptoms?: Greater than 2 weeks  Please list any medications you are currently taking for this condition.: Fluoxetine 80mg  Please describe any probable cause for these symptoms. : Work, my past    Patient presents today in office, at last visit Prozac was increased from 60 to 80 mg for which patient reports has been effective, patient found another bottle of the 20 mg, and has been taking 4 of the 20 mg.  Reports less anxiety and depressive symptoms.  Denies side effects since dose increase, sleeping well.     Noted patient started Phentermine per PCP which was dispensed 1/23/24 for weight loss. " "Patient has decreased soda intake, patient noticing weight loss which has helped with mood improvement.  Patient was also started on Buspar 15 mg twice daily per PCP due to having 2 panic attacks over the last month, which patient has not taken.     Patient was in bed laying flat about to go to sleep and began to have SOA, feeling panic, started shaking.  Patient got out of bed, drank some water and ate a snack and sat on couch to calm down, and was able to recover, \"it lasted no more than 5-10 minutes.\"    Continues to see therapist monthly, which patient finds therapeutic and does not desire biweekly at this time.     12/7/23:    Answers submitted by the patient for this visit:  Primary Reason for Visit (Submitted on 12/5/2023)  What is the primary reason for your visit?: Other  Other (Submitted on 12/5/2023)  Please describe your symptoms.: Medicine check  Have you had these symptoms before?: Yes  How long have you been having these symptoms?: Greater than 2 weeks  Please list any medications you are currently taking for this condition.: Fluoxetine 20 MG (x3)  Please describe any probable cause for these symptoms. : Stress anxiety depression PTSD    Patient presents today in office, at last visit patient was reminded to take total dose of Prozac 60 mg at one time in the morning at 11 am as patient has been taking 40 mg at 11 am and 20 mg in the evening 530-6 pm. Which patient admits to adherence. Patient reports Prozac has been helpful in managing PTSD and anxiety, though admits to low energy.  Patient had felt fatigue prior to dose time changes.     Patient has been working more hours, though even while off and rested and while in [place] in 8/2023, felt didn't want to go out to [place] and wanted to stay indoors, though uncertain if \"its just me. Seems I wasn't excited to go anywhere, I was on vacation.\"      Patient no longer having chest pain, with anxiety, able to talk to people easier now.     Patient was able to " see therapist since last visit.  Patient has another visit next week, attending monthly.      Patient had work issue in 2/2022 and when patient did have interactions with those individuals would experience physical symptoms of anxiety. Before the Prozac would avoid interactions with that individual, and now able to not be bothered.   In the past while taking Prozac years ago does not recall experiencing anhedonia and fatigue, though symptoms have improved, they could be better.     Depression: Patient complains of depression. He complains of anhedonia, depressed mood, difficulty concentrating, fatigue, and feelings of worthlessness/guilt  Anxiety:  The patient endorses significant symptoms of anxiety including:  anxiousness, being easily fatigued, difficulty concentrating or mind going blank, and irritability which have caused impairment in important areas of daily functioning.      8/28/23:    Answers submitted by the patient for this visit:  Primary Reason for Visit (Submitted on 8/21/2023)  What is the primary reason for your visit?: Other  Other (Submitted on 8/21/2023)  Please describe your symptoms.: Anxiety (causing chest pain) PTSD Depression  Have you had these symptoms before?: Yes  How long have you been having these symptoms?: Greater than 2 weeks  Please list any medications you are currently taking for this condition.: Fluoxetine 60mg  Please describe any probable cause for these symptoms. : Work, stress, sleep apnea    Patient presents today in office, at last visit Prozac was increased from 40 to 60 mg and was advised to take later in the day due to patient working 2nd shift and had reported feeling the medication was corbin off and not lasting 12 hrs. Reports improvement in mood, has been taking 40 mg of old supply at 11 am and takes 20 mg while on break at work between 530-6 pm.  Denies side effects, tolerating well.    Patient had some increased stress regarding finances as truck needed repairs.  "Work is better.  Feels he is able to handle stress at home better than the past.    Patient has scheduled an appointment with therapist 9/13/23.       6/20/23:    Answers submitted by the patient for this visit:  Primary Reason for Visit (Submitted on 6/13/2023)  What is the primary reason for your visit?: Other  Other (Submitted on 6/13/2023)  Please describe your symptoms.: Anxiety  depression  PTSD  Have you had these symptoms before?: Yes  How long have you been having these symptoms?: Greater than 2 weeks  Please list any medications you are currently taking for this condition.: Fluoxetine 40  Please describe any probable cause for these symptoms. : Work, childhood, stress    Patient presents today in office reports having days Prozac is not working due to symptoms of lack of focus, chest discomfort due to anxiety. Though symptoms are not as frequent as with 20 mg Prozac.  Has days of being more \"anti-social\" and good days, and will compensate low energy levels with caffeine intake.  \"My brain makes it feel like a bad day, nothing out of the ordinary making it a bad day.\"  Patient works 4p-12 midnight, and takes dose upon awakening at 9-10 am, and around 10-11 pm feels anxiety starts. Since starting Prozac feels needs a nap in the afternoon, though uncertain as patient started 2nd shift 10/2022 as well as Prozac vs KAILASH which is untreated.     Patient was advised to schedule appointment with therapist at last visit, however, has not heard back since leaving a message.     Depression: Patient complains of depression. He complains of anhedonia, depressed mood, difficulty concentrating, fatigue, feelings of worthlessness/guilt, and insomnia  Anxiety:  The patient endorses significant symptoms of anxiety including: excessive anxiety and worry about a number of events or activities for more days than not, restlessness or feeling keyed up, being easily fatigued, difficulty concentrating or mind going blank, " "irritability, and sleep disturbance which have caused impairment in important areas of daily functioning.      3/21/23:    Answers for HPI/ROS submitted by the patient on 3/14/2023  What is the primary reason for your visit?: Other  Please describe your symptoms.: Depression, anxiety,  ptsd  Have you had these symptoms before?: Yes  How long have you been having these symptoms?: Greater than 2 weeks  Please list any medications you are currently taking for this condition.: Fluoxetine 40mg  Patient presents today in office, at last visit Prozac was increased from 20 to 40 mg for which patient reports \"I am doing well. I don't think my anxiety will go away completely, but it is definitely better, I don't dwell on it.\" Patient reports overall Prozac has been very effective and is pleased with results.  Sleep has improved due to decreased anxiety, able to calm mind.      Patient had an appointment in early 1/2023 with therapy which was cancelled by provider and has not received a call to reschedule, which patient has not attempted to reschedule, though plans to do so today.      Patient has felt anxiety prior to going into work, though quickly subsides, and patient feels may be due to the idea of just going into work.  Overall mood has improved in regards to depression.     1/31/23:  Patient presents today in office \"I feel like the problems are still there, but I am dealing with them better, I am not stressing about it as much at home.  Started Prozac at last visit, for which patient feels has helped improve mood.  Patient explains in Nov 2022 had some financial concerns, and was able to refinance both vehicles which lowered monthly payments, and \"I felt like if I didn't have the Prozac I wouldn't have been able to do that.\"      Patient denies SI, though at times has thoughts \"if I was gone they would be happier financially, because of monthly child support.\"  Son is 16 currently. Denies feelings of harming self or " "others, denies rumination of thoughts or action plan.      Patient takes Prozac later in morning at 11 am-12 noon due to working 2nd shift, though at times will not go to sleep until 3 am.  Patient had difficulty with sleep prior to starting Prozac as patient has KAILASH and is unable to wear the mask.  Recently bought a mouth guard to help with snoring.  Denies gasping for air, though wife says patient has in the past. \"I do snore really loud.\"    Patient continues to see therapist, which is going well.     Depression: Patient complains of depression. He complains of anhedonia, depressed mood, fatigue, feelings of worthlessness/guilt and insomnia    Anxiety:  The patient endorses significant symptoms of anxiety including: excessive anxiety and worry about a number of events or activities for more days than not, being easily fatigued, difficulty concentrating or mind going blank, irritability and sleep disturbance which have caused impairment in important areas of daily functioning.        10/18/22:  Patient presents today in office reporting tolerating Lexapro initially, though having difficulty falling asleep, and approximately 1 week ago stopped taking Lexapro and noticed immediately those symptoms resolved.  As now patient is not having difficulty going to sleep.  Patient tried to take lower dose of Hydroxyzine 10 mg and continued to feel groggy the following day.     Patient reports started therapy with Elyssa and has upcoming appointment 10/25/22.     Patient reports switched shifts from 8a-4pm to 4 pm-12 midnight, which started 2 weeks ago, continues to have chest pain with anxiety while driving, sleep has improved, however, depression rates 6 our of 10, and anxiety 8or9 /10.  \"This hasn't been the best year.\"  Difficulty's with employer, finances, son, life stressors.     Patient further explains he was having difficulty sleeping while taking Lexapro at lower dose, though open to possible reason related to " "anxiety.   Recalls while taking Prozac also took Hydroxyzine at night which suspect feelings of grogginess, \"feeling so crappy was a combination of the two together.\"     9/13/22:  Patient presents today in office reporting  \"Effexor not working for me. Sweating a lot and feel zoned out\" and felt bloated, patient was started on 37.5 mg of Effexor at last appointment and decreased as needed Hydroxyzine from 25 to 10 mg by mouth every 6 hr as needed.  Patient reports stopped taking Effexor XR after almost 2 weeks due to adverse side effects.  Once patient stopped Effexor XR the symptoms resolved.     Patient tried lower dose of Hydroxyzine of 10 mg after stopping Effexor XR for 2 days and continued to feel tired, though not able to get to sleep, patient took off from work to help \"destress and difficulty sleeping.\" Patient has been off for approximately 2 weeks including scheduled off days.      Patient was able to schedule appointment with therapist for 9/28/22 at 2 pm.      Patient wife has been driving , although, patient did  son from school, and able to keep mind off of anxiety while talking to son.  School is 5-10 minutes away from patient home.     Patient does suffer from neck pain which worsens with CPAP/BiPap mask despite trying to loosen strap.  Not able to tolerate.    Patient most impairing symptom is the depression and anxiety as patient believes \"insomnia is a product of the anxiety.\" Patient complaint of inability to calm mind down, \"I don't try to think about it, it just hits me.\"       8/9/22:  INITIAL EVAL  Patient presents today in office with a history of anxiety and depression.   Patient began treatment approximately 10-12 yrs ago with PCP's, has never seen a behavioral health provider.  Patient initial demeanor presents as anxious, somewhat guarded, and flat, however, as visit progressed patient was very open with current stressors and feelings.      Depression: Patient complains of " "depression. He complains of anhedonia, depressed mood, difficulty concentrating, fatigue, feelings of worthlessness/guilt and negative thoughts of feeling family would be better off without him, though denies actual suicidal thoughts.  Patient admits to \"if I was gone everyone would be better off, I would never do it, I just think about it, fleeting throughout different parts of the day.\"  Patient reports while working it is easier, however, endorses to increased anxiety related to work.  \"a lot of the issues stem from work as well.\"  Denies action plan.  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  Patient was then given some acid reflex medications and had a cardiac workup which was negative.  Patient was then started on Prozac 20 mg 4/26/22.      Current manager is different than before, has had past problems with current female supervisor in 12/2020 while helping putting cylinders away, co-worker put in wrong bins but patient name was on the work, however, patient was punished and had told union, \" she had taken me out of the computer and she refused to put me back in the computer, had arguments prior to this incident,\" patient feels " "retaliation from this supervisor which has weighed on patient as supervisor now giving patient the \"cold shoulder\". And has difficulty with anxiety.  Patient reports past employer at [employer] did not have any problems, however, patient feels he does not share same  interests as fellow employees.  Patient has been at [employer] for 23 yrs, \"and I still feel like an outsider.\"     Patient admits to having feelings of chest pressure and minimal sweating to palms while driving, \"anytime I get an anxious or stressful situation almost,\" patient takes Hydroxyzine 25 mg and feels in zombie state, and 3-4 hrs later will take Buspar 10 mg which causes drowsiness, denies dizziness.  Patient believes anxiety symptoms while driving began after incident earlier this year with the [employer], March 2022.    Patient admits to only taking Buspar as needed, does not need or take while off work, and on weekends takes once if working as there is a \"lighter management\" .  Patient reports he is unable to take Prozac at exact same time with Buspar and hydroxyzine due to feelings of \"spacing out\".   Patient taking Prozac every morning which patient states, \"it makes me a little more social.  Not so much while driving to work, it's only 2 min.from my house. 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 \"High traffic area gets my chest going and I get anxious.\"          PHQ-9 Depression Screening  PHQ-9 Total Score:   6/3/2024 6     Little interest or pleasure in doing things?     Feeling down, depressed, or hopeless?     Trouble falling or staying asleep, or sleeping too much?     Feeling tired or having little energy?     Poor appetite or overeating?     Feeling bad about yourself - or that you are a failure or have let yourself or your family down?     Trouble concentrating on things, such as reading the newspaper or watching television?     Moving or " speaking so slowly that other people could have noticed? Or the opposite - being so fidgety or restless that you have been moving around a lot more than usual?     Thoughts that you would be better off dead, or of hurting yourself in some way?     PHQ-9 Total Score       BEE-7    6/3/2024 4    The following portions of the patient's history were reviewed and updated as appropriate: allergies, current medications, past family history, past medical history, past social history, and past surgical history.     Past Surgical History:  Past Surgical History:   Procedure Laterality Date    COLONOSCOPY N/A 7/26/2024    Procedure: COLONOSCOPY WITH POLYPECTOMY;  Surgeon: Pranav Granger MD;  Location: [facility] ENDOSCOPY;  Service: General;  Laterality: N/A;  DIVERTICULOSIS. COLON POLYP    ENDOSCOPY N/A 7/26/2024    Procedure: ESOPHAGOGASTRODUODENOSCOPY with biopsies;  Surgeon: Pranav Granger MD;  Location: [facility] ENDOSCOPY;  Service: General;  Laterality: N/A;  NORMAL    LUMBAR DECOMPRESSION  03/09/2016    JE  L4-5 lumbar decomp disckectomy and TLIF  sextant mtrix and stealth    LUMBAR FUSION Right 03/09/2016    Procedure: RT L4-5 LUMBAR DECOMPRESSION DISCECTOMY AND TLIF SEXTANT METRIX AND STEALTH;  Surgeon: Celestino Mackay MD;  Location: [facility] OR;  Service:     PLANTAR FASCIA RELEASE Left 02/09/2021    Procedure: LEFT FOOT ENDOSCOPIC PLANTAR FASCIOTOMY;  Surgeon: Rafy Bauer DPM;  [place] [facility] [place];  Service: Podiatry;  Laterality: Left;    SPINE SURGERY      lumbar spinal diskectomy L4/L5  L5/S1 2009    WISDOM TOOTH EXTRACTION         Problem List:  Patient Active Problem List   Diagnosis    Lumbosacral disc disease    Work related injury    Post-operative state    Decreased sensation    Low back pain    Vitamin D deficiency    Acquired hammer toe of left foot    Cavus deformity of left foot    Hyperkeratosis    Pain in left foot    Plantar fasciitis    Dysthymia    Anxiety    Major  depressive disorder with single episode, in partial remission    Seasonal allergic rhinitis due to pollen    Class 2 obesity due to excess calories without serious comorbidity with body mass index (BMI) of 36.0 to 36.9 in adult    Pruritus    Screen for colon cancer    Overweight    Long-term use of high-risk medication    Allergic rhinitis    Gastroesophageal reflux disease without esophagitis    Gastroesophageal reflux disease with esophagitis without hemorrhage    Epigastric pain    Screening for malignant neoplasm of colon       Allergy:   No Known Allergies     Discontinued Medications:  Medications Discontinued During This Encounter   Medication Reason    omeprazole (priLOSEC) 40 MG capsule Patient Reported Not Taking               Current Medications:   Current Outpatient Medications   Medication Sig Dispense Refill    Azelastine-Fluticasone 137-50 MCG/ACT suspension USE 1 SPRAY(S) IN EACH NOSTRIL TWICE DAILY      FLUoxetine (PROzac) 40 MG capsule Take 2 capsules by mouth Every Morning for 180 days. Indications: Generalized Anxiety Disorder, Major Depressive Disorder 180 capsule 1    albuterol sulfate  (90 Base) MCG/ACT inhaler  (Patient not taking: Reported on 8/9/2024)      doxycycline (MONODOX) 100 MG capsule  (Patient not taking: Reported on 8/9/2024)       No current facility-administered medications for this visit.       Past Medical History:  Past Medical History:   Diagnosis Date    Anxiety     Back pain     CHF (congestive heart failure) 2022    Not bad at moment, likely result of sleep apnea    Depression     Head injury     Lumbar herniated disc     Lumbosacral disc disease     Obesity     Obstructive sleep apnea     C-PAP mask not working/ not currently treating    Panic disorder     PTSD (post-traumatic stress disorder)     Spinal headache 2001    Had issues with neck pain    Work related injury 10/30/2014    reinjured 8-13-15       Past Psychiatric History:  Began Treatment: approximately  10 or more years ago with  PCP at the time  Diagnoses:Depression, Anxiety and suspected PTSD  Psychiatrist:Denies  Therapist: 2016 through workman's comp prior to back surgery  Admission History:Denies  Medication Trials: Xanax and Zoloft initally [id number] with -ineffective; 10-12 yrs ago placed on Prozac-ineffective previously; Effexor XR 8/9-9/13/22 sweating,zoned out,abd bloating; Buspar 10 mg as needed drowsiness; Hydroxyzine-drowsiness with low dose of 10 mg, next day grogginess ; Lexapro-insomnia after 5 weeks, possible related to anxiety, though self stopped mid Oct.2022    Self Harm: Denies  Suicide Attempts:Denies    Substance Abuse History:   Types:Denies all, including illicit  Withdrawal Symptoms:Denies  Longest Period Sober:Not Applicable   AA: Not applicable     Social History: As of 8/9/24  Martial Status:  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, 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)  Kids:Yes or If so, how many 1 son  age 19 will be 20 10/2024   House:Lives in a house   History: Denies  Access to Guns:  No    Social History     Socioeconomic History    Marital status:     Number of children: 1    Highest education level: High school graduate   Tobacco Use    Smoking status: Never    Smokeless tobacco: Never   Vaping Use    Vaping status: Never Used   Substance and Sexual Activity    Alcohol use: No    Drug use: Never    Sexual activity: Not Currently     Partners: Female     Birth control/protection: None       Family History:   Suicide Attempts: Denies  Suicide Completions:Denies      Family History   Problem Relation Age of Onset    Diabetes Father     Anxiety disorder Son     ADD / ADHD Son     Depression Son     Mental illness Son        Developmental History:   Born: [place]  Siblings:1 brother  Childhood:  physical,mental-by father  High School:Completed  College:Denies    Mental Status Exam:   Hygiene:   good  Cooperation:  Cooperative  Eye  Contact:  Good  Psychomotor Behavior:  Appropriate  Affect:  Appropriate  Mood: euthymic   Speech:  Normal  Thought Process:  Goal directed  Thought Content:  Mood congruent  Suicidal:  None  Homicidal:  None  Hallucinations:  None  Delusion:  None  Memory:  Intact  Orientation:  Person, Place, Time and Situation  Reliability:  good  Insight:  Good  Judgement:  Good  Impulse Control:  Good  Physical/Medical Issues:  Yes KAILASH-untreated due to claustraphobia with mask, L-Spine back pain,GERD      Review of Systems:  Review of Systems   Constitutional:  Negative for diaphoresis and fatigue.   HENT:  Negative for drooling.    Eyes:  Negative for visual disturbance.   Respiratory:  Positive for apnea. Negative for cough and shortness of breath.         KAILASH, unable to tolerate mask, snores loudly   Cardiovascular:  Negative for chest pain, palpitations and leg swelling.   Gastrointestinal:  Negative for nausea and vomiting.   Endocrine: Negative for cold intolerance and heat intolerance.   Genitourinary:  Negative for difficulty urinating.   Musculoskeletal:  Negative for joint swelling.   Allergic/Immunologic: Negative for immunocompromised state.   Neurological:  Negative for dizziness, seizures, speech difficulty and numbness.   Psychiatric/Behavioral:  Negative for agitation, confusion, decreased concentration, hallucinations, self-injury, sleep disturbance and suicidal ideas. The patient is nervous/anxious. The patient is not hyperactive.          Physical Exam:  Physical Exam  Psychiatric:         Attention and Perception: Attention and perception normal.         Mood and Affect: Mood and affect normal.         Speech: Speech normal.         Behavior: Behavior normal. Behavior is cooperative.         Thought Content: Thought content normal. Thought content does not include suicidal ideation. Thought content does not include suicidal plan.         Cognition and Memory: Cognition and memory normal.         Judgment:  "Judgment normal.         Vital Signs:   /78   Pulse 88   Ht 175.3 cm (69.02\")   Wt 99.3 kg (219 lb)   BMI 32.32 kg/m²      Office notes from care team reviewed:  Date of [place]: [place] [facility] [place] of Service: 6[place]12/24 [place] CHINO Kumar [place] [facility] [place] [place]      Lab Results: Reviewed  Admission on 07/26/2024, Discharged on 07/26/2024   Component Date Value Ref Range Status    Case Report 07/26/2024    Final                    Value:Surgical Pathology Report                         Case: [id number]                                  Authorizing Provider:  Pranav Granger MD  Collected:           07/26/2024 12:54 PM          Ordering Location:     [facility] Received:            07/26/2024 01:51 PM                                 SUITES                                                                       Pathologist:           Chris Dalton MD                                                            Specimens:   1) - Gastric, Antrum, ANTRUM BX                                                                     2) - GE Junction, GE JUNCTION BX                                                                    3) - Large Intestine, Transverse Colon, TRANSVERSE COLON POLYP                             Clinical Information 07/26/2024    Final                    Value:This result contains rich text formatting which cannot be displayed here.    Final Diagnosis 07/26/2024    Final                    Value:This result contains rich text formatting which cannot be displayed here.    Gross Description 07/26/2024    Final                    Value:This result contains rich text formatting which cannot be displayed here.    Microscopic Description 07/26/2024    Final                    Value:This result contains rich text formatting which cannot be displayed here.   Office Visit on 06/13/2024   Component Date Value Ref Range Status    Amphetamine Screen, Urine " 06/13/2024 Negative  Negative Final    Barbiturates Screen, Urine 06/13/2024 Negative  Negative Final    Buprenorphine, Screen, Urine 06/13/2024 Negative  Negative Final    Benzodiazepine Screen, Urine 06/13/2024 Negative  Negative Final    Cocaine Screen, Urine 06/13/2024 Negative  Negative Final    MDMA (ECSTASY) 06/13/2024 Negative  Negative Final    Methamphetamine, Ur 06/13/2024 Negative  Negative Final    Morphine/Opiates Screen, Urine 06/13/2024 Negative  Negative Final    Methadone Screen, Urine 06/13/2024 Negative  Negative Final    Oxycodone Screen, Urine 06/13/2024 Negative  Negative Final    Phencyclidine (PCP), Urine 06/13/2024 Negative  Negative Final    THC, Screen, Urine 06/13/2024 Negative  Negative Final    Lot Number 06/13/2024 [id number]   Final    Expiration Date 06/13/2024 12/28/2025   Final   Office Visit on 04/15/2024   Component Date Value Ref Range Status    Amphetamine Screen, Urine 04/15/2024 Positive (A)  Negative Final    Barbiturates Screen, Urine 04/15/2024 Negative  Negative Final    Buprenorphine, Screen, Urine 04/15/2024 Negative  Negative Final    Benzodiazepine Screen, Urine 04/15/2024 Negative  Negative Final    Cocaine Screen, Urine 04/15/2024 Negative  Negative Final    MDMA (ECSTASY) 04/15/2024 Negative  Negative Final    Methamphetamine, Ur 04/15/2024 Negative  Negative Final    Methadone Screen, Urine 04/15/2024 Negative  Negative Final    Opiate Screen 04/15/2024 Negative  Negative Final    Oxycodone Screen, Urine 04/15/2024 Negative  Negative Final    Phencyclidine (PCP), Urine 04/15/2024 Negative  Negative Final    THC, Screen, Urine 04/15/2024 Negative  Negative Final    Lot Number 04/15/2024 [id number]   Final    Expiration Date 04/15/2024 11/12/25   Final       EKG Results:  No orders to display     EKG dated 1/23/24, NSR, QT-367, Qtc-437, HR 85     Imaging Results:  No Images in the past 120 days found..      Assessment & Plan   Diagnoses and all orders for this  visit:    1. Generalized anxiety disorder (Primary)    2. Social anxiety disorder    3. ADHD (attention deficit hyperactivity disorder) evaluation    4. Medication management                  Visit Diagnoses:    ICD-10-CM ICD-9-CM   1. Generalized anxiety disorder  F41.1 300.02   2. Social anxiety disorder  F40.10 300.23   3. ADHD (attention deficit hyperactivity disorder) evaluation  Z13.39 V79.8   4. Medication management  Z79.899 V58.69                 PLAN:  Safety: No acute safety concerns  Therapy: Currently Seeing a Therapist SHIRLEY Leslie,  seeing monthly.  Risk Assessment: Risk of self-harm acutely is moderate  Risk factors include anxiety disorder, mood disorder,fleeting negative thoughts, and recent psychosocial stressors (pandemic). Protective factors include no family history, denies access to guns/weapons, no present SI, no history of suicide attempts or self-harm in the past, minimal AODA, healthcare seeking, future orientation, willingness to engage in care.  Risk of self-harm chronically is also moderate, but could be further elevated in the event of treatment noncompliance and/or AODA.  Meds:  Continue Prozac (Fluoxetine) 80 mg by mouth daily in the morning to target anxiety and depression. Discussed all risks, benefits, alternatives, and side effects of Fluoxetine including but not limited to GI upset, decreased appetite, sexual dysfunction, bleeding risk, seizure risk, insomnia, anxiety, drowsiness, headache, tremor, nervousness, activation of kena or hypomania, increased fragility fracture risk, hyponatremia, ocular effects, serotonin syndrome, hypersensitivity reaction, and activation of suicidal ideation and behavior. Patient educated on the need to practice safe sex while taking this medication. Discussed the need for patient to immediately call the office for any new or worsening symptoms, such as worsening depression; feeling nervous or restless; suicidal thoughts or actions;  or other changes in mood or behavior, and all other concerns. Patient educated on medication compliance.  Patient verbalized understanding and is agreeable to taking Fluoxetine. Addressed all questions and concerns.    5.  Labs:  N/a    Patient does not meet criteria for ADHD.  Symptoms described are related to underlying social anxiety and generalized anxiety.  Patient described while taking Phentermine it helped his mood and wished to discuss the effectiveness and start a stimulant for ADHD he self presumed to have based on feelings while taking stimulant medication.  Explained to patient stimulant medications are utilized for ADHD with the exception of Phentermine for weight loss, if patient wished to have formal testing he may seek, however, based on evaluation today and past visits with patient, ADHD was not a differential diagnosis. Discussed options for non-stimulant medications such as Atomoxetine or Wellbutrin XL, however, both are not recommended with coadministration of Prozac.   The plan was discussed with the patient. The patient was given time to ask questions and these questions were answered. At the conclusion of their visit they had no additional questions or concerns and all questions were answered to their satisfaction.   Patient was given instructions and counseling regarding condition and for health maintenance advice. Please see specific information pulled into the AVS if appropriate.    Patient to contact provider if symptoms worsen or fail to improve.      7/29/24: TELEPHONE  Received a secure chat message from [place] office reported patient had called to move up his appointment scheduled in Nov. Due to his PCP telling him he needed to be on ADHD medications but could not be prescribed phentermine with an ADHD stimulant medication.  Patient is scheduled for this Friday 8/2/24.  Secure chat message sent to PCP to discuss request after chart review did not indicate a prior discussion  "with PCP. Per discussion with PCP, patient had not been told to be placed on a medication for ADHD and that she had agreed to prescribe Phentermine for 3 months with a 3 month break between, which patient had mentioned previously at last visit.      Called patient to clarify reason for an earlier visit as ADHD evaluations are scheduled for longer appointment times. Patient reports when he last seen PCP in June, was told he may have an underlying diagnosis of ADHD based on patient feeling more sharp while taking Phentermine.  Patient was informed with any stimulant medication it does effect the brain differently and likely did feel \"sharper\".  Patient does wish to proceed with an ADHD evaluation, patient has to be at work at 330 pm which is near the office.  Next available time for ADHD evaluation is on Friday 8/9/24 at 1440, which patient was offered a note to take to employer as he may be a little late, though patient does not believe that will be needed and agrees to cancel the appointment for this Friday and come in 8/9/24 at 1440.     Updated PCP of patient decision to proceed with ADHD evaluation and instructed MA to change appointment times as indicated.     6/3/24:   -Therapy: Currently Seeing a Therapist SHIRLEY Leslie,  seeing monthly.  -Continue Prozac (Fluoxetine) 80 mg by mouth daily in the morning to target anxiety and depression.   Patient adherent to once daily in the morning schedule.  Refilled today for 90 days with 1 refill.    Symptoms of anxiety, depression are under good control with current medication regimen.  Praised patient for weight loss thus far, and encouraged to continue with dietary restrictions as patient had expressed some anxiousness about having to stop Phentermine for a few months due to cardiac risks.  The plan was discussed with the patient. The patient was given time to ask questions and these questions were answered. At the conclusion of their visit they had no " additional questions or concerns and all questions were answered to their satisfaction.   Patient was given instructions and counseling regarding condition and for health maintenance advice. Please see specific information pulled into the AVS if appropriate.    Patient to contact provider if symptoms worsen or fail to improve.      1/30/24:  -Therapy: Currently Seeing a Therapist SHIRLEY Leslie,  seeing monthly.  -Continue Prozac (Fluoxetine) 80 mg by mouth daily in the morning to target anxiety and depression. Currently taking 4 of the 20 mg on hand.  Patient adherent to once daily in the morning schedule.  Patient instructed to request refills when appropriate, when down to 5-7 days remaining via  pharmacy or via [employer].     -Removed Buspar 15 mg twice daily as needed from medication profile, as patient has not used, and previously unable to tolerate 10 mg dose due to sedation.  Educated patient that this medication should be taken at least twice daily routinely for at least 4 weeks for effectiveness.  Patient agrees to not take this medication.     Symptoms of anxiety, depression are under good control with current medication regimen.  Suspect panic symptoms described are related to untreated KAILASH, based on report today.  If panic symptoms reoccur patient is to contact provider to discuss treatment options.   Patient was given instructions and counseling regarding condition and for health maintenance advice. Please see specific information pulled into the AVS if appropriate.    Patient to contact provider if symptoms worsen or fail to improve.      12/7/23:   -Therapy: Currently Seeing a Therapist SHIRLEY Leslie,  seeing monthly, suggested biweekly.   INCREASE Prozac (Fluoxetine) FROM 60 mg TO 80 mg by mouth daily in the morning to target anxiety and depression. Instructed to start taking 2 of the 40 mg ordered today, and may start taking 4 of the 20 mg on hand until prescription available.   Patient adherent to once daily in the morning schedule.  New prescription sent.     Symptoms of anxiety are under good control with current medication regimen.  However, struggling with depressed mood, patient has been taking current dose of Prozac since 6/2023, and started Prozac 10/2023.  Agreeable to plan.   Patient was given instructions and counseling regarding condition and for health maintenance advice. Please see specific information pulled into the AVS if appropriate.    Patient to contact provider if symptoms worsen or fail to improve.      8/28/23:   Continue Prozac (Fluoxetine) 60 mg by mouth daily in the morning to target anxiety and depression.  Reminded patient to take total dose of Prozac 60 mg at one time in the morning at 11 am as patient has been taking 40 mg at 11 am and 20 mg in the evening 530-6 pm.    -Patient instructed to request refills when appropriate, when down to 5-7 days remaining via  pharmacy or via [employer].     Symptoms of anxiety, depression are under good control with current medication regimen.      Patient was given instructions and counseling regarding condition and for health maintenance advice. Please see specific information pulled into the AVS if appropriate.    Patient to contact provider if symptoms worsen or fail to improve.      6/20/23:   Therapy: Currently Seeing a Therapist SHIRLEY Leslie, advised patient to reach out to reschedule appointment again, as patient has not received a call back.  Suggest for patient to call weekly, set reminder in phone to call until return call received, and if not received returned call within the next 2 weeks, to contact this provider to assist.  Increase Prozac (Fluoxetine) FROM 40 mg TO 60 mg by mouth daily in the morning to target anxiety and depression. Instructed to take 1 of the 40 mg on hand with 20 mg capsule ordered today, once depleted supply of 40 mg to  take 3 of the 20 mg caps to equal 60 mg daily.  Advised  patient to start taking a few hours later than currently, patient taking at 9 am and suggested to take at between 11-12 noon initially as medication may worsen insomnia.      Symptoms of anxiety, depression are under fair control with current medication regimen.  Denies side effects of medication. Due to symptoms resurfacing approximately 12 hrs after morning dose, suggested alternate timing which patient is agreeable.   Patient was given instructions and counseling regarding condition and for health maintenance advice. Please see specific information pulled into the AVS if appropriate.    Patient to contact provider if symptoms worsen or fail to improve.        3/21/23:   Therapy: Currently Seeing a Therapist SHIRLEY Leslie, advised patient to reach out to reschedule appointment as provider had to cancel last appointment.   Continue Prozac (Fluoxetine) 40 mg by mouth daily in the morning to target anxiety and depression.  NR needed today, will plan to reorder with a 90 day supply when refill is needed.   Symptoms of anxiety and depression are under good control with Prozac. Patient to contact provider if symptoms worsen or fail to improve.       1/31/23:   Therapy: Currently Seeing a Therapist SHIRLEY Leslie    Increase Prozac (Fluoxetine) FROM 20 mg TO 40 mg by mouth daily in the morning to target anxiety and depression.   Patient instructed to start taking 2 of the 20 mg caps on hand until supply depleted, which should be in approximately 1 week.  New prescription sent in for 40 mg cap.     Symptoms of anxiety and depression are improving with Prozac, however, continues to have symptoms therefore, will increase dose of Prozac.  Patient to contact provider if symptoms worsen or fail to improve.     10/18/22:   Therapy: Currently Seeing a Therapist SHIRLEY Leslie  Start Prozac (Fluoxetine) 20 mg by mouth daily in the morning to target anxiety and depression.  Discussed all risks,  "benefits, alternatives, and side effects of Fluoxetine including but not limited to GI upset, decreased appetite, sexual dysfunction, bleeding risk, seizure risk, insomnia, anxiety, drowsiness, headache, tremor, nervousness, activation of kena or hypomania, increased fragility fracture risk, hyponatremia, ocular effects, serotonin syndrome, hypersensitivity reaction, and activation of suicidal ideation and behavior. Patient educated on the need to practice safe sex while taking this medication. Discussed the need for patient to immediately call the office for any new or worsening symptoms, such as worsening depression; feeling nervous or restless; suicidal thoughts or actions; or other changes in mood or behavior, and all other concerns. Patient educated on medication compliance.  Patient verbalized understanding and is agreeable to taking Fluoxetine. Addressed all questions and concerns.     Patient willing to try Prozac again as while taking 10-12 yrs ago was taking with Hydroxyzine and believes that combination made him feel worse.  Suspect Lexapro needed to be a higher dose as patient self stopped approximately 1 week ago due to inability to go to sleep easily, and symptoms subsided for the most part since stopping Lexapro. Symptoms of depression and anxiety remain present and agreeable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

## 2024-08-09 NOTE — PATIENT INSTRUCTIONS
"  SPECIFIC RECOMMENDATIONS:     1.      Medications discussed at this encounter:                   -  no changes    2.      Psychotherapy recommendations: Continue with current therapist.      3.     Return to clinic: 6 months, Tues. 2/11/25 at 11 am    Please arrive at least 15 minutes before your scheduled appointment time to complete check in process.      IF you are scheduled for a [facility] VIDEO visit, YOU MUST COMPLETE THE \"E-CHECK IN\" PROCESS PRIOR TO BEGINNING THE VISIT, YOU WILL NO LONGER RECEIVE A PHONE CALL PRIOR TO ALL VIDEO VISITS; You may still complete the E-Check in for in office visits prior to appointment, you will receive multiple text/email reminders which will direct you further if needed.           "

## 2024-08-15 ENCOUNTER — LAB (OUTPATIENT)
Dept: LAB | Facility: HOSPITAL | Age: 49
End: 2024-08-15
Payer: COMMERCIAL

## 2024-08-15 ENCOUNTER — OFFICE VISIT (OUTPATIENT)
Dept: FAMILY MEDICINE CLINIC | Age: 49
End: 2024-08-15
Payer: COMMERCIAL

## 2024-08-15 VITALS
BODY MASS INDEX: 32.41 KG/M2 | DIASTOLIC BLOOD PRESSURE: 73 MMHG | OXYGEN SATURATION: 95 % | SYSTOLIC BLOOD PRESSURE: 123 MMHG | WEIGHT: 218.8 LBS | TEMPERATURE: 98.9 F | HEIGHT: 69 IN | HEART RATE: 91 BPM

## 2024-08-15 DIAGNOSIS — J30.9 ALLERGIC RHINITIS, UNSPECIFIED SEASONALITY, UNSPECIFIED TRIGGER: ICD-10-CM

## 2024-08-15 DIAGNOSIS — Z12.5 PROSTATE CANCER SCREENING: ICD-10-CM

## 2024-08-15 DIAGNOSIS — Z00.00 ANNUAL PHYSICAL EXAM: Primary | ICD-10-CM

## 2024-08-15 DIAGNOSIS — I50.32 CHRONIC DIASTOLIC (CONGESTIVE) HEART FAILURE: ICD-10-CM

## 2024-08-15 DIAGNOSIS — E66.09 CLASS 1 OBESITY DUE TO EXCESS CALORIES WITHOUT SERIOUS COMORBIDITY WITH BODY MASS INDEX (BMI) OF 32.0 TO 32.9 IN ADULT: ICD-10-CM

## 2024-08-15 DIAGNOSIS — F41.9 ANXIETY: ICD-10-CM

## 2024-08-15 DIAGNOSIS — F32.4 MAJOR DEPRESSIVE DISORDER WITH SINGLE EPISODE, IN PARTIAL REMISSION: ICD-10-CM

## 2024-08-15 DIAGNOSIS — Z23 ENCOUNTER FOR IMMUNIZATION: ICD-10-CM

## 2024-08-15 DIAGNOSIS — G47.33 OBSTRUCTIVE SLEEP APNEA SYNDROME: ICD-10-CM

## 2024-08-15 DIAGNOSIS — Z12.11 COLON CANCER SCREENING: ICD-10-CM

## 2024-08-15 DIAGNOSIS — K21.9 GASTROESOPHAGEAL REFLUX DISEASE WITHOUT ESOPHAGITIS: ICD-10-CM

## 2024-08-15 LAB — PSA SERPL-MCNC: 0.56 NG/ML (ref 0–4)

## 2024-08-15 PROCEDURE — G0103 PSA SCREENING: HCPCS

## 2024-08-15 PROCEDURE — 36415 COLL VENOUS BLD VENIPUNCTURE: CPT

## 2024-08-15 RX ORDER — PANTOPRAZOLE SODIUM 40 MG/1
40 TABLET, DELAYED RELEASE ORAL DAILY
Qty: 30 TABLET | Refills: 2 | Status: SHIPPED | OUTPATIENT
Start: 2024-08-15

## 2024-08-15 RX ORDER — BUSPIRONE HYDROCHLORIDE 15 MG/1
15 TABLET ORAL DAILY PRN
Qty: 40 TABLET | Refills: 1 | Status: SHIPPED | OUTPATIENT
Start: 2024-08-15

## 2024-08-15 NOTE — PROGRESS NOTES
Pranav Calderon presents to [facility].    Chief Complaint: yearly physical    Subjective     History of Present Illness:  HPI    Patient is in today for yearly physical.  Last colonoscopy: 7/26/2024  Last PSA:  7/25/2023-PSA was normal  Urinary symptoms:  Nocturia:  Sexual concerns: ED  EtOH use: None  Tobacco use: None  He wears a seatbelt in the car  Drug use: None  Last eye exam: recc yearly  Dental exams every 6 months  IMMUNIZATIONS:  COVID BOOSTER, FLU   DIET:  healthy, 2100 esau diet  EXERCISE:  exercising going to pool and walking  HEARING:  none    F/u for wt loss program, was on phentermine-stopped June 26th, he is still exercising going to pool and walking, and on healthy 2100 calorie diet, yet gained 11 # back off med.      His chronic allergies with nasal congestion and rhinorrhea are currently stable with as needed OTC Xyzal and Flonase nasal spray.      He does not tolerate CPAP or BiPAP for his chronic sleep apnea and he defers f/u with sleep specialist will continue to encourage him to work hard on his weight loss, low calorie diet and daily exercise he will lose weight and this will also help his sleep apnea.  He defers the inspire device at this time      He presents with chronic anxiety and depression, doing well,  on prozac, he states most of his issues relate to the stress of his obesity, he desperately wants to lose weight, no recent panic attacks.  As noted below he is working on weight loss.  He doesn't sleep well due to sleep apnea.  Denies SI/HI        GERD continues to be bad, she saw her GI specialist who thinks his allergy issues may be GERD and not allergies. He tried prilosec 40 mg daily and it did not help      H/o CHF-will set up echo and re eval with cardiology    ECHO 4/22/2022  1. Normal left ventricular systolic function with estimated LV ejection fraction of 55%.  2. Grade 1 diastolic dysfunction of the left ventricle.     He has ED and would  like to try medication for this, he is aware of risks and SE and will seek ER eval if erection is maintained > 24 hours.  I need cardiology to clear him before I can order      Result Review   The following data was reviewed by Fauzia Ballard MD on 08/15/2024.  Lab Results   Component Value Date    WBC 6.08 07/25/2023    HGB 15.8 07/25/2023    HCT 48.4 07/25/2023    MCV 95.7 07/25/2023     07/25/2023     Lab Results   Component Value Date    GLUCOSE 96 07/25/2023    BUN 15 07/25/2023    CREATININE 1.05 07/25/2023     07/25/2023    K 4.3 07/25/2023     07/25/2023    CALCIUM 9.6 07/25/2023    PROTEINTOT 7.2 07/25/2023    ALBUMIN 4.4 07/25/2023    ALT 48 (H) 07/25/2023    AST 36 07/25/2023    ALKPHOS 46 07/25/2023    BILITOT 0.4 07/25/2023    GLOB 2.8 07/25/2023    AGRATIO 1.6 07/25/2023    BCR 14.3 07/25/2023    ANIONGAP 9.0 07/25/2023    EGFR 88.7 07/25/2023     Lab Results   Component Value Date    CHOL 170 07/25/2023    CHLPL 172 08/05/2020    TRIG 86 07/25/2023    HDL 56 07/25/2023    LDL 98 07/25/2023     Lab Results   Component Value Date    TSH 1.870 07/25/2023     Lab Results   Component Value Date    HGBA1C 5.50 07/25/2023     Lab Results   Component Value Date    PSA 0.483 07/25/2023         Lab Results   Component Value Date    GVHB22XZ 29.0 (L) 07/25/2023               Assessment and Plan:   Diagnoses and all orders for this visit:    1. Annual physical exam (Primary)    2. Colon cancer screening    3. Encounter for immunization    4. Prostate cancer screening  -     PSA SCREENING; Future    5. Gastroesophageal reflux disease without esophagitis    6. Allergic rhinitis    7. Anxiety  -     busPIRone (BUSPAR) 15 MG tablet; Take 1 tablet by mouth Daily As Needed (anxiety).  Dispense: 40 tablet; Refill: 1    8. Major depressive disorder with single episode, in partial remission    9. Obstructive sleep apnea syndrome    10. Seasonal allergic rhinitis due to pollen    11. Class 1 obesity  "due to excess calories without serious comorbidity with body mass index (BMI) of 32.0 to 32.9 in adult    12. Chronic diastolic (congestive) heart failure  -     Ambulatory Referral to Cardiology  -     Adult Transthoracic Echo Complete W/ Cont if Necessary Per Protocol; Future    Other orders  -     pantoprazole (PROTONIX) 40 MG EC tablet; Take 1 tablet by mouth Daily.  Dispense: 30 tablet; Refill: 2              Objective     Medications:  Current Outpatient Medications   Medication Instructions    Azelastine-Fluticasone 137-50 MCG/ACT suspension USE 1 SPRAY(S) IN EACH NOSTRIL TWICE DAILY    busPIRone (BUSPAR) 15 mg, Oral, Daily PRN    FLUoxetine (PROZAC) 80 mg, Oral, Every Morning    pantoprazole (PROTONIX) 40 mg, Oral, Daily        Vital Signs:   /73 (BP Location: Left arm, Patient Position: Sitting, Cuff Size: Large Adult)   Pulse 91   Temp 98.9 °F (37.2 °C) (Oral)   Ht 175.3 cm (69.02\")   Wt 99.2 kg (218 lb 12.8 oz)   SpO2 95%   BMI 32.30 kg/m²             Physical Exam:  Physical Exam  Vitals and nursing note reviewed.   Constitutional:       General: He is not in acute distress.     Appearance: Normal appearance. He is not ill-appearing, toxic-appearing or diaphoretic.   HENT:      Head: Normocephalic and atraumatic.      Right Ear: Tympanic membrane, ear canal and external ear normal.      Left Ear: Tympanic membrane, ear canal and external ear normal.      Nose: Nose normal. No congestion or rhinorrhea.      Mouth/Throat:      Pharynx: Oropharynx is clear. No oropharyngeal exudate or posterior oropharyngeal erythema.   Eyes:      Conjunctiva/sclera: Conjunctivae normal.   Cardiovascular:      Rate and Rhythm: Normal rate.      Pulses: Normal pulses.   Pulmonary:      Effort: Pulmonary effort is normal. No respiratory distress.      Breath sounds: Normal breath sounds. No stridor. No wheezing, rhonchi or rales.   Musculoskeletal:         General: Normal range of motion.      Cervical back: " Normal range of motion and neck supple. No rigidity.   Lymphadenopathy:      Cervical: No cervical adenopathy.   Skin:     General: Skin is warm and dry.      Capillary Refill: Capillary refill takes less than 2 seconds.   Neurological:      Mental Status: He is alert and oriented to person, place, and time.   Psychiatric:         Mood and Affect: Mood normal.         Behavior: Behavior normal.           Review of Systems:  Review of Systems   Constitutional:  Negative for chills, fatigue and fever.   HENT:  Negative for congestion, ear discharge and sore throat.    Respiratory:  Negative for shortness of breath.    Cardiovascular:  Negative for chest pain.   Gastrointestinal:  Negative for abdominal pain, constipation, diarrhea, nausea, vomiting and GERD.   Genitourinary:  Positive for erectile dysfunction. Negative for flank pain and nocturia.   Neurological:  Negative for dizziness and headache.   Psychiatric/Behavioral:  Negative for depressed mood.               Follow Up   Return in about 6 months (around 2/15/2025) for Recheck.    Part of this note may be an electronic transcription/translation of spoken language to printed   text using the Dragon Dictation System.            Medical History:  Medications Discontinued During This Encounter   Medication Reason    doxycycline (MONODOX) 100 MG capsule *Therapy completed    albuterol sulfate  (90 Base) MCG/ACT inhaler *Therapy completed      Past Medical History:    Anxiety    Back pain    CHF (congestive heart failure)    Not bad at moment, likely result of sleep apnea    Depression    Head injury    Lumbar herniated disc    Lumbosacral disc disease    Obesity    Obstructive sleep apnea    C-PAP mask not working/ not currently treating    Panic disorder    PTSD (post-traumatic stress disorder)    Spinal headache    Had issues with neck pain    Work related injury    reinjured 8-13-15     Past Surgical History:    COLONOSCOPY    Procedure: COLONOSCOPY WITH  POLYPECTOMY;  Surgeon: Pranav Granger MD;  Location: [facility] ENDOSCOPY;  Service: General;  Laterality: N/A;  DIVERTICULOSIS. COLON POLYP    ENDOSCOPY    Procedure: ESOPHAGOGASTRODUODENOSCOPY with biopsies;  Surgeon: Pranav Granger MD;  Location: [facility] ENDOSCOPY;  Service: General;  Laterality: N/A;  NORMAL    LUMBAR DECOMPRESSION    JEH  L4-5 lumbar decomp disckectomy and TLIF  sextant mtrix and stealth    LUMBAR FUSION    Procedure: RT L4-5 LUMBAR DECOMPRESSION DISCECTOMY AND TLIF SEXTANT METRIX AND STEALTH;  Surgeon: Celestino Mackay MD;  Location: [facility] MAIN OR;  Service:     PLANTAR FASCIA RELEASE    Procedure: LEFT FOOT ENDOSCOPIC PLANTAR FASCIOTOMY;  Surgeon: Rafy Bauer DPM;  Location: [facility] MAIN OR;  Service: Podiatry;  Laterality: Left;    SPINE SURGERY    lumbar spinal diskectomy L4/L5  L5/S1 2009    WISDOM TOOTH EXTRACTION      Family History   Problem Relation Age of Onset    Diabetes Father     Anxiety disorder Son     ADD / ADHD Son     Depression Son     Mental illness Son      Social History     Tobacco Use    Smoking status: Never    Smokeless tobacco: Never   Substance Use Topics    Alcohol use: No       Health Maintenance Due   Topic Date Due    INFLUENZA VACCINE  08/01/2024        Immunization History   Administered Date(s) Administered    COVID-19 (PFIZER) Purple Cap Monovalent 04/02/2021, 04/28/2021    COVID-19 F23 (PFIZER) 12YRS+ (COMIRNATY) 01/23/2024    Flu Vaccine Intradermal Quad 18-64YR 10/19/2021    Influenza Injectable Mdck Pf Quad 10/19/2021, 10/25/2022, 09/28/2023    Influenza, Unspecified 10/25/2022    Tdap 07/25/2023       No Known Allergies

## 2024-08-20 ENCOUNTER — OFFICE VISIT (OUTPATIENT)
Dept: SURGERY | Facility: CLINIC | Age: 49
End: 2024-08-20
Payer: COMMERCIAL

## 2024-08-20 VITALS
BODY MASS INDEX: 32.44 KG/M2 | SYSTOLIC BLOOD PRESSURE: 113 MMHG | HEART RATE: 78 BPM | WEIGHT: 219 LBS | HEIGHT: 69 IN | DIASTOLIC BLOOD PRESSURE: 70 MMHG

## 2024-08-20 DIAGNOSIS — K57.90 DIVERTICULOSIS: ICD-10-CM

## 2024-08-20 DIAGNOSIS — Z98.890 S/P ENDOSCOPY: Primary | ICD-10-CM

## 2024-08-20 DIAGNOSIS — D36.9 TUBULAR ADENOMA: ICD-10-CM

## 2024-08-20 DIAGNOSIS — Z98.890 S/P COLONOSCOPY WITH POLYPECTOMY: ICD-10-CM

## 2024-08-20 PROCEDURE — 99212 OFFICE O/P EST SF 10 MIN: CPT | Performed by: NURSE PRACTITIONER

## 2024-08-20 NOTE — PROGRESS NOTES
Chief Complaint: Follow-up    Subjective      Follow-up visit       History of Present Illness  Pranav Calderon is a 49 y.o. male presents to [facility] for follow-up visit.    Patient presents today for follow-up visit after undergoing EGD and colonoscopy on 7/26/2024 performed by Dr. Pranav Granger.  Patient was with a normal EGD.  Biopsies were negative for intestinal metaplasia, dysplasia and malignancy.  Patient was with diverticula of the sigmoid; a tubular adenoma of the transverse colon per colonoscopy/pathology.  Resection and retrieval were complete.    Clinical Information    Gastroesophageal reflux disease with esophagitis without hemorrhage  Epigastric pain  Screening for malignant neoplasm of colon   Final Diagnosis   1. Stomach, antrum, biopsy:               - Gastric antrum mucosa with no significant pathologic change               - Negative for Helicobacter pylori on routine H&E stain               - Negative for intestinal metaplasia, dysplasia and malignancy        2.  Gastroesophageal junction, biopsy:               -Squamous mucosa with no significant pathologic change               - Negative for intestinal metaplasia, dysplasia and malignancy        3.  Transverse colon polyp, biopsy:               -Tubular adenoma   Electronically signed by Chris Dalton MD on 7/29/2024        EGD and colonoscopy were performed without difficulty.  The patient tolerated the procedure well.    Patient denies any postoperative complications.    Patient does report that he is still having some left chest discomfort, that seems to be hanging around in his left shoulder.  I did recommend patient keep cardiology appointment.    Objective     Past Medical History:   Diagnosis Date    Anxiety     Back pain     CHF (congestive heart failure) 2022    Not bad at moment, likely result of sleep apnea    Depression     Head injury     Lumbar herniated disc     Lumbosacral disc disease      Obesity     Obstructive sleep apnea     C-PAP mask not working/ not currently treating    Panic disorder     PTSD (post-traumatic stress disorder)     Spinal headache 2001    Had issues with neck pain    Work related injury 10/30/2014    reinjured 8-13-15       Past Surgical History:   Procedure Laterality Date    COLONOSCOPY N/A 7/26/2024    Procedure: COLONOSCOPY WITH POLYPECTOMY;  Surgeon: Pranav Granger MD;  Location: [facility] ENDOSCOPY;  Service: General;  Laterality: N/A;  DIVERTICULOSIS. COLON POLYP    ENDOSCOPY N/A 7/26/2024    Procedure: ESOPHAGOGASTRODUODENOSCOPY with biopsies;  Surgeon: Pranav Granger MD;  Location: [facility] ENDOSCOPY;  Service: General;  Laterality: N/A;  NORMAL    LUMBAR DECOMPRESSION  03/09/2016    JEH  L4-5 lumbar decomp disckectomy and TLIF  sextant mtrix and stealth    LUMBAR FUSION Right 03/09/2016    Procedure: RT L4-5 LUMBAR DECOMPRESSION DISCECTOMY AND TLIF SEXTANT METRIX AND STEALTH;  Surgeon: Celestino Mackay MD;  Location: [facility] MAIN OR;  Service:     PLANTAR FASCIA RELEASE Left 02/09/2021    Procedure: LEFT FOOT ENDOSCOPIC PLANTAR FASCIOTOMY;  Surgeon: Rafy Bauer DPM;  Location: [facility] MAIN OR;  Service: Podiatry;  Laterality: Left;    SPINE SURGERY      lumbar spinal diskectomy L4/L5  L5/S1 2009    WISDOM TOOTH EXTRACTION         Outpatient Medications Marked as Taking for the 8/20/24 encounter (Office Visit) with Rena Treviño APRN   Medication Sig Dispense Refill    Azelastine-Fluticasone 137-50 MCG/ACT suspension USE 1 SPRAY(S) IN EACH NOSTRIL TWICE DAILY      busPIRone (BUSPAR) 15 MG tablet Take 1 tablet by mouth Daily As Needed (anxiety). 40 tablet 1    FLUoxetine (PROzac) 40 MG capsule Take 2 capsules by mouth Every Morning for 180 days. Indications: Generalized Anxiety Disorder, Major Depressive Disorder 180 capsule 1    pantoprazole (PROTONIX) 40 MG EC tablet Take 1 tablet by mouth Daily. 30 tablet 2       No Known Allergies     Family History  "  Problem Relation Age of Onset    Diabetes Father     Anxiety disorder Son     ADD / ADHD Son     Depression Son     Mental illness Son        Social History     Socioeconomic History    Marital status:     Number of children: 1    Highest education level: High school graduate   Tobacco Use    Smoking status: Never    Smokeless tobacco: Never   Vaping Use    Vaping status: Never Used   Substance and Sexual Activity    Alcohol use: No    Drug use: Never    Sexual activity: Not Currently     Partners: Female     Birth control/protection: None       Review of Systems   Constitutional:  Negative for chills and fever.   HENT:  Negative for trouble swallowing.    Gastrointestinal:  Negative for abdominal distention, abdominal pain, anal bleeding, blood in stool, constipation, diarrhea, nausea, rectal pain, vomiting, GERD and indigestion.        Vital Signs:   /70 (BP Location: Left arm, Patient Position: Sitting, Cuff Size: Adult)   Pulse 78   Ht 175.3 cm (69.02\")   Wt 99.3 kg (219 lb)   BMI 32.32 kg/m²      Physical Exam  Vitals and nursing note reviewed.   Constitutional:       General: He is not in acute distress.     Appearance: Normal appearance. He is not ill-appearing.   HENT:      Head: Normocephalic and atraumatic.   Cardiovascular:      Rate and Rhythm: Normal rate.   Pulmonary:      Effort: Pulmonary effort is normal.   Abdominal:      Palpations: Abdomen is soft.      Tenderness: There is no guarding.   Musculoskeletal:         General: No deformity. Normal range of motion.   Skin:     General: Skin is warm and dry.      Coloration: Skin is not jaundiced.   Neurological:      General: No focal deficit present.      Mental Status: He is alert and oriented to person, place, and time.   Psychiatric:         Mood and Affect: Mood normal.         Thought Content: Thought content normal.          Result Review :          []  Laboratory  []  Radiology  []  Pathology  []  Microbiology  []  " EKG/Telemetry   []  Cardiology/Vascular   []  Old records  Today reviewed Dr. Granger's operative and pathology report.     Assessment and Plan    Diagnoses and all orders for this visit:    1. S/P endoscopy (Primary)    2. S/P colonoscopy with polypectomy    3. Tubular adenoma    4. Diverticulosis        Follow Up   Return for Rescreen colon in 5 years; follow-up in interim as needed.    In regards to patient's left chest and left shoulder pain I recommended the patient keep his follow-up appointment with cardiology.  Patient verbalizes understanding.    Increase fiber.  May supplement with Metamucil/FiberCon  Notify office with any fever/chills; nausea or vomiting; and/or rectal bleeding  Start probiotics to help prevention of onset of acute diverticulitis    Avoid high fat diets  Increase fiber intake    Per AGA guidelines patient will follow-up for colonoscopy surveillance as directed.    Patient was given instructions and counseling regarding his condition or for health maintenance advice. Please see specific information pulled into the AVS if appropriate.

## 2024-09-12 ENCOUNTER — TELEPHONE (OUTPATIENT)
Dept: FAMILY MEDICINE CLINIC | Age: 49
End: 2024-09-12
Payer: COMMERCIAL

## 2024-09-12 NOTE — TELEPHONE ENCOUNTER
Please advise if okay to renew FMLA for anxiety/depression for 2 episodes per month for 6 months.  This is a [facility] patient sending to on call provider. susi

## 2024-09-14 NOTE — TELEPHONE ENCOUNTER
Looks like he is seeing Archana Macario.  Seems like she should be the one completing the FMLA paper for anxiety/depression

## 2024-09-20 ENCOUNTER — OFFICE VISIT (OUTPATIENT)
Facility: HOSPITAL | Age: 49
End: 2024-09-20
Payer: COMMERCIAL

## 2024-09-20 VITALS — OXYGEN SATURATION: 97 % | WEIGHT: 217.2 LBS | HEIGHT: 69 IN | HEART RATE: 103 BPM | BODY MASS INDEX: 32.17 KG/M2

## 2024-09-20 DIAGNOSIS — G47.33 OSA (OBSTRUCTIVE SLEEP APNEA): ICD-10-CM

## 2024-09-20 DIAGNOSIS — E66.09 CLASS 2 OBESITY DUE TO EXCESS CALORIES WITHOUT SERIOUS COMORBIDITY WITH BODY MASS INDEX (BMI) OF 36.0 TO 36.9 IN ADULT: Primary | ICD-10-CM

## 2024-09-20 DIAGNOSIS — E66.09 CLASS 1 OBESITY DUE TO EXCESS CALORIES WITH BODY MASS INDEX (BMI) OF 32.0 TO 32.9 IN ADULT, UNSPECIFIED WHETHER SERIOUS COMORBIDITY PRESENT: ICD-10-CM

## 2024-09-20 DIAGNOSIS — G47.19 EXCESSIVE DAYTIME SLEEPINESS: ICD-10-CM

## 2024-09-20 PROCEDURE — G0463 HOSPITAL OUTPT CLINIC VISIT: HCPCS

## 2024-09-30 ENCOUNTER — HOSPITAL ENCOUNTER (OUTPATIENT)
Dept: SLEEP MEDICINE | Facility: HOSPITAL | Age: 49
Discharge: HOME | End: 2024-09-30
Admitting: PSYCHIATRY & NEUROLOGY
Payer: COMMERCIAL

## 2024-09-30 DIAGNOSIS — G47.19 EXCESSIVE DAYTIME SLEEPINESS: ICD-10-CM

## 2024-09-30 DIAGNOSIS — E66.09 CLASS 1 OBESITY DUE TO EXCESS CALORIES WITH BODY MASS INDEX (BMI) OF 32.0 TO 32.9 IN ADULT, UNSPECIFIED WHETHER SERIOUS COMORBIDITY PRESENT: ICD-10-CM

## 2024-09-30 DIAGNOSIS — E66.812 CLASS 2 OBESITY DUE TO EXCESS CALORIES WITHOUT SERIOUS COMORBIDITY WITH BODY MASS INDEX (BMI) OF 36.0 TO 36.9 IN ADULT: ICD-10-CM

## 2024-09-30 DIAGNOSIS — E66.09 CLASS 2 OBESITY DUE TO EXCESS CALORIES WITHOUT SERIOUS COMORBIDITY WITH BODY MASS INDEX (BMI) OF 36.0 TO 36.9 IN ADULT: ICD-10-CM

## 2024-09-30 DIAGNOSIS — G47.33 OSA (OBSTRUCTIVE SLEEP APNEA): ICD-10-CM

## 2024-09-30 DIAGNOSIS — E66.811 CLASS 1 OBESITY DUE TO EXCESS CALORIES WITH BODY MASS INDEX (BMI) OF 32.0 TO 32.9 IN ADULT, UNSPECIFIED WHETHER SERIOUS COMORBIDITY PRESENT: ICD-10-CM

## 2024-09-30 PROCEDURE — 95806 SLEEP STUDY UNATT&RESP EFFT: CPT

## 2024-10-02 ENCOUNTER — OFFICE VISIT (OUTPATIENT)
Dept: CARDIOLOGY | Facility: CLINIC | Age: 49
End: 2024-10-02
Payer: COMMERCIAL

## 2024-10-02 VITALS
HEIGHT: 69 IN | HEART RATE: 97 BPM | WEIGHT: 219 LBS | BODY MASS INDEX: 32.44 KG/M2 | SYSTOLIC BLOOD PRESSURE: 121 MMHG | DIASTOLIC BLOOD PRESSURE: 80 MMHG

## 2024-10-02 DIAGNOSIS — I51.89 DIASTOLIC DYSFUNCTION: ICD-10-CM

## 2024-10-02 DIAGNOSIS — R07.89 CHEST PAIN, ATYPICAL: Primary | ICD-10-CM

## 2024-10-02 PROCEDURE — 99203 OFFICE O/P NEW LOW 30 MIN: CPT | Performed by: INTERNAL MEDICINE

## 2024-10-02 NOTE — ASSESSMENT & PLAN NOTE
Echocardiogram done last month and also in 2020 showed grade 1 diastolic dysfunction/impaired relaxation.  These findings were discussed in detail with the patient.  He is not in congestive heart failure, clinically not volume overloaded.  Blood pressure is well-controlled.  This could be related to sleep apnea.

## 2024-10-02 NOTE — PROGRESS NOTES
CARDIOLOGY INITIAL CONSULT       Chief Complaint  Chest Pain and Establish Care    Reason for consultation  Chronic diastolic heart failure    Subjective            Pranav Calderon presents to [facility] CARD[facility]OLOG[facility]  History of Present Illness    This is a 49-year-old male with anxiety disorder and sleep apnea.  He was referred for cardiac evaluation because of chronic diastolic heart failure.  On further conversation, he is reporting intermittent episodes of chest pain.  They are described as a burning sensation on the left side of the chest, with and without activity.  He had ER visits for the same in the past.      Of note, he was seen in our office in 2020 following a prolonged hospital admission for COVID-19.  There were concerns about cardiomyopathy, echo done during hospital stay showed ejection fraction of 45%.  Repeat echocardiogram showed normal LV function.  He was seen again by Mr. Juan Mckeon in 2022 for chest pain, following an ER visit.  He had echocardiogram and exercise treadmill stress test done which were unremarkable.    He denies any shortness of breath, palpitations, dizziness or syncopal episodes.      Past History:    Medical History:  Past Medical History:   Diagnosis Date    Anxiety     Back pain     CHF (congestive heart failure) 2022    Not bad at moment, likely result of sleep apnea    Depression     Head injury     Lumbar herniated disc     Lumbosacral disc disease     Obesity     Obstructive sleep apnea     C-PAP mask not working/ not currently treating    Panic disorder     PTSD (post-traumatic stress disorder)     Spinal headache 2001    Had issues with neck pain    Work related injury 10/30/2014    reinjured 8-13-15       Family History: family history includes ADD / ADHD in his son; Anxiety disorder in his son; Depression in his son; Diabetes in his father; Mental illness in his son.     Social History: reports that he has never smoked. He has never used  "smokeless tobacco. He reports that he does not drink alcohol and does not use drugs.    Allergies: Patient has no known allergies.    Current Outpatient Medications on File Prior to Visit   Medication Sig    Azelastine-Fluticasone 137-50 MCG/ACT suspension USE 1 SPRAY(S) IN EACH NOSTRIL TWICE DAILY    busPIRone (BUSPAR) 15 MG tablet Take 1 tablet by mouth Daily As Needed (anxiety).    FLUoxetine (PROzac) 40 MG capsule Take 2 capsules by mouth Every Morning for 180 days. Indications: Generalized Anxiety Disorder, Major Depressive Disorder    pantoprazole (PROTONIX) 40 MG EC tablet Take 1 tablet by mouth Daily.     No current facility-administered medications on file prior to visit.          Review of Systems   Constitutional:  Negative for fatigue, unexpected weight gain and unexpected weight loss.   Eyes:  Negative for double vision.   Respiratory:  Negative for cough, shortness of breath and wheezing.    Cardiovascular:  Positive for chest pain. Negative for palpitations and leg swelling.   Gastrointestinal:  Negative for abdominal pain, nausea and vomiting.   Endocrine: Negative for cold intolerance, heat intolerance, polydipsia and polyuria.   Musculoskeletal:  Negative for arthralgias and back pain.   Skin:  Negative for color change.   Neurological:  Negative for dizziness, syncope, weakness and headache.   Hematological:  Does not bruise/bleed easily.        Objective     /80   Pulse 97   Ht 175.3 cm (69\")   Wt 99.3 kg (219 lb)   BMI 32.34 kg/m²       Physical Exam  Constitutional:       General: He is awake. He is not in acute distress.     Appearance: Normal appearance.   Eyes:      Extraocular Movements: Extraocular movements intact.      Pupils: Pupils are equal, round, and reactive to light.   Neck:      Thyroid: No thyromegaly.      Vascular: No carotid bruit or JVD.   Cardiovascular:      Rate and Rhythm: Normal rate and regular rhythm.      Chest Wall: PMI is not displaced.      Heart sounds: " Normal heart sounds, S1 normal and S2 normal. No murmur heard.     No friction rub. No gallop. No S3 or S4 sounds.   Pulmonary:      Effort: Pulmonary effort is normal. No respiratory distress.      Breath sounds: Normal breath sounds. No wheezing, rhonchi or rales.   Abdominal:      General: Bowel sounds are normal.      Palpations: Abdomen is soft.      Tenderness: There is no abdominal tenderness.   Musculoskeletal:      Cervical back: Neck supple.      Right lower leg: No edema.      Left lower leg: No edema.   Skin:     Nails: There is no clubbing.   Neurological:      General: No focal deficit present.      Mental Status: He is alert and oriented to person, place, and time.           Result Review :     The following data was reviewed by: Thomas Block MD on 10/02/2024:      No recent labs available for review.  Labs from 2023 reviewed     Data reviewed: Cardiology studies    Results for orders placed in visit on 09/18/24    Adult Transthoracic Echo Complete W/ Cont if Necessary Per Protocol    Interpretation Summary    Left ventricular systolic function is normal. Left ventricular ejection fraction appears to be 56 - 60%.    Left ventricular diastolic function is consistent with (grade I) impaired relaxation.    There are no significant valvular abnormalities.                   Assessment and Plan        Diagnoses and all orders for this visit:    1. Chest pain, atypical (Primary)  Assessment & Plan:  Recurrent episodes of chest pain, symptoms are somewhat atypical for angina.  He had an exercise treadmill stress test done in 2022 which was unremarkable.  Recent echocardiogram showed normal LV function.  Will proceed with a CT coronary angiogram to rule out obstructive CAD as etiology.    Orders:  -     CT Angiogram Coronary-Cardiology Interpretation    2. Diastolic dysfunction  Assessment & Plan:  Echocardiogram done last month and also in 2020 showed grade 1 diastolic dysfunction/impaired relaxation.   These findings were discussed in detail with the patient.  He is not in congestive heart failure, clinically not volume overloaded.  Blood pressure is well-controlled.  This could be related to sleep apnea.      Other orders  -     Echocardiogram Scan  -     Stress Test Scan          I spent 20 minutes caring for Pranav on this date of service. This time includes time spent by me in the following activities:reviewing tests, obtaining and/or reviewing a separately obtained history, performing a medically appropriate examination and/or evaluation , ordering medications, tests, or procedures, and documenting information in the medical record    Follow Up     Return for Will schedule f/u after reviewing test results.    Patient was given instructions and counseling regarding his condition or for health maintenance advice. Please see specific information pulled into the AVS if appropriate.

## 2024-10-02 NOTE — ASSESSMENT & PLAN NOTE
Recurrent episodes of chest pain, symptoms are somewhat atypical for angina.  He had an exercise treadmill stress test done in 2022 which was unremarkable.  Recent echocardiogram showed normal LV function.  Will proceed with a CT coronary angiogram to rule out obstructive CAD as etiology.

## 2024-10-03 DIAGNOSIS — G47.33 OSA (OBSTRUCTIVE SLEEP APNEA): Primary | ICD-10-CM

## 2024-10-03 DIAGNOSIS — G47.19 EXCESSIVE DAYTIME SLEEPINESS: ICD-10-CM

## 2024-10-03 DIAGNOSIS — E66.811 CLASS 1 OBESITY DUE TO EXCESS CALORIES WITH BODY MASS INDEX (BMI) OF 32.0 TO 32.9 IN ADULT, UNSPECIFIED WHETHER SERIOUS COMORBIDITY PRESENT: ICD-10-CM

## 2024-10-03 DIAGNOSIS — E66.09 CLASS 1 OBESITY DUE TO EXCESS CALORIES WITH BODY MASS INDEX (BMI) OF 32.0 TO 32.9 IN ADULT, UNSPECIFIED WHETHER SERIOUS COMORBIDITY PRESENT: ICD-10-CM

## 2024-11-05 ENCOUNTER — OFFICE VISIT (OUTPATIENT)
Dept: FAMILY MEDICINE CLINIC | Age: 49
End: 2024-11-05
Payer: COMMERCIAL

## 2024-11-05 VITALS
OXYGEN SATURATION: 97 % | HEART RATE: 84 BPM | HEIGHT: 69 IN | SYSTOLIC BLOOD PRESSURE: 107 MMHG | BODY MASS INDEX: 31.19 KG/M2 | DIASTOLIC BLOOD PRESSURE: 76 MMHG | WEIGHT: 210.6 LBS

## 2024-11-05 DIAGNOSIS — E66.811 CLASS 1 OBESITY DUE TO EXCESS CALORIES WITHOUT SERIOUS COMORBIDITY WITH BODY MASS INDEX (BMI) OF 31.0 TO 31.9 IN ADULT: ICD-10-CM

## 2024-11-05 DIAGNOSIS — F32.A ANXIETY AND DEPRESSION: ICD-10-CM

## 2024-11-05 DIAGNOSIS — N52.9 ERECTILE DYSFUNCTION, UNSPECIFIED ERECTILE DYSFUNCTION TYPE: ICD-10-CM

## 2024-11-05 DIAGNOSIS — Z71.3 WEIGHT LOSS COUNSELING, ENCOUNTER FOR: Primary | ICD-10-CM

## 2024-11-05 DIAGNOSIS — F41.9 ANXIETY AND DEPRESSION: ICD-10-CM

## 2024-11-05 DIAGNOSIS — K21.9 GASTROESOPHAGEAL REFLUX DISEASE WITHOUT ESOPHAGITIS: ICD-10-CM

## 2024-11-05 DIAGNOSIS — E66.09 CLASS 1 OBESITY DUE TO EXCESS CALORIES WITHOUT SERIOUS COMORBIDITY WITH BODY MASS INDEX (BMI) OF 31.0 TO 31.9 IN ADULT: ICD-10-CM

## 2024-11-05 DIAGNOSIS — Z79.899 HIGH RISK MEDICATION USE: ICD-10-CM

## 2024-11-05 DIAGNOSIS — G47.33 OBSTRUCTIVE SLEEP APNEA SYNDROME: ICD-10-CM

## 2024-11-05 DIAGNOSIS — R07.9 CHEST PAIN, UNSPECIFIED TYPE: ICD-10-CM

## 2024-11-05 PROCEDURE — 99214 OFFICE O/P EST MOD 30 MIN: CPT | Performed by: FAMILY MEDICINE

## 2024-11-05 RX ORDER — PHENTERMINE HYDROCHLORIDE 37.5 MG/1
37.5 CAPSULE ORAL EVERY MORNING
Qty: 30 CAPSULE | Refills: 0 | Status: CANCELLED | OUTPATIENT
Start: 2024-11-05

## 2024-11-05 NOTE — PROGRESS NOTES
Pranav Calderon presents to [facility].    Chief Complaint: FMLA for anxiety and depression, weight loss follow-up    Subjective     History of Present Illness:  HPI  Pranav is in today to follow-up for his chronic anxiety and depression.  He is needing his FMLA paperwork refilled out.  He sees [facility]ia Macario psychiatry and a therapist Elyssa Dillard.  He is on Prozac and tolerates medication well.  He feels like most the time he states his anxiety and depression are under good control.  He typically sees his therapist twice a month and Ashia Macario once every 3 months, but he does have flareups and occasionally needs to see psychiatry and therapy more often than that within a month.  At this time he feels like he is coping well and when his anxiety flares up at work he is able to settle himself back down most times.  He has intermittent feelings of depression and sadness.  Denies HI/SI.    He would like to be on Viagra so I reached out to his cardiologist to see if he can start Viagra.  His cardiologist said there is no contraindication but that he was pending a CT calcium cardiac scan and would prefer this to be done first before starting him on Viagra.  Given that that is the case.  I would be hesitant to start him on phentermine until he has a negative CT calcium cardiac scan.    He has a new CPAP and is tolerating it better    Pranav is in today for his weight loss program.  He has been doing incredible.  He is lost 9 pounds in the last 3 months off all medication.  He is sticking to a 1600-calorie diet and walking an hour every day.  When he was on phentermine he tolerated the medication well.  His BMI is still 31 and elevated.  At this stage I will have him proceed with a cardiac calcium CT scan and if negative I will restart him on his medication.          Result Review   The following data was reviewed by Fauzia Ballard MD on 11/05/2024.  Lab Results   Component  "Value Date    WBC 6.08 07/25/2023    HGB 15.8 07/25/2023    HCT 48.4 07/25/2023    MCV 95.7 07/25/2023     07/25/2023     Lab Results   Component Value Date    GLUCOSE 96 07/25/2023    BUN 15 07/25/2023    CREATININE 1.05 07/25/2023     07/25/2023    K 4.3 07/25/2023     07/25/2023    CALCIUM 9.6 07/25/2023    PROTEINTOT 7.2 07/25/2023    ALBUMIN 4.4 07/25/2023    ALT 48 (H) 07/25/2023    AST 36 07/25/2023    ALKPHOS 46 07/25/2023    BILITOT 0.4 07/25/2023    GLOB 2.8 07/25/2023    AGRATIO 1.6 07/25/2023    BCR 14.3 07/25/2023    ANIONGAP 9.0 07/25/2023    EGFR 88.7 07/25/2023     Lab Results   Component Value Date    CHOL 170 07/25/2023    CHLPL 172 08/05/2020    TRIG 86 07/25/2023    HDL 56 07/25/2023    LDL 98 07/25/2023     Lab Results   Component Value Date    TSH 1.870 07/25/2023     Lab Results   Component Value Date    HGBA1C 5.50 07/25/2023     Lab Results   Component Value Date    PSA 0.563 08/15/2024    PSA 0.483 07/25/2023     No results found for: \"IRON\"   Lab Results   Component Value Date    GXUF17NE 29.0 (L) 07/25/2023               Assessment and Plan:   Diagnoses and all orders for this visit:    1. Weight loss counseling, encounter for (Primary)  Comments:  Counseled on healthy diet and daily exercise.  Will restart phentermine when this cardiac calcium CT scan is back and WNL    2. Gastroesophageal reflux disease without esophagitis  Comments:  No issues in a while.  Will have him change pantoprazole to as needed dosing and he is to avoid spicy and acidic food in his diet    3. Class 1 obesity due to excess calories without serious comorbidity with body mass index (BMI) of 31.0 to 31.9 in adult  Comments:  Counseled on healthy diet and daily exercise. Will restart phentermine when this cardiac calcium CT scan is back and WNL    4. Obstructive sleep apnea syndrome  Comments:  He is on a new CPAP and tolerating well    5. Erectile dysfunction, unspecified erectile dysfunction " "type  Comments:  Pending normal cardiac calcium CT scan I will start him on Viagra    6. Anxiety and depression  Comments:  He needs his [facility] paperwork filled out.  He is overall stable and doing well.  He needs [facility] for his therapy and psychiatric appointments    7. Chest pain, unspecified type  Comments:  He is seen Dr. Block, cardiology, pending cardiac calcium CT scan for further evaluation  Orders:  -     CT Cardiac Calcium Score Without Dye; Future    8. High risk medication use  Comments:  Today's is F2 F.  No SI/SX diversion or abuse.  Tolerates phentermine well  Orders:  -     POC Medline 12 Panel Urine Drug Screen              Objective     Medications:  Current Outpatient Medications   Medication Instructions    Azelastine-Fluticasone 137-50 MCG/ACT suspension USE 1 SPRAY(S) IN EACH NOSTRIL TWICE DAILY    busPIRone (BUSPAR) 15 mg, Oral, Daily PRN, for anxiety    FLUoxetine (PROZAC) 80 mg, Oral, Every Morning    pantoprazole (PROTONIX) 40 mg, Oral, Daily        Vital Signs:   /76 (BP Location: Left arm, Patient Position: Sitting, Cuff Size: Large Adult)   Pulse 84   Ht 175.3 cm (69.02\")   Wt 95.5 kg (210 lb 9.6 oz)   SpO2 97%   BMI 31.09 kg/m²             Physical Exam:  Physical Exam  Vitals and nursing note reviewed.   Constitutional:       General: He is not in acute distress.     Appearance: Normal appearance. He is not ill-appearing, toxic-appearing or diaphoretic.   HENT:      Head: Normocephalic and atraumatic.      Right Ear: Tympanic membrane, ear canal and external ear normal.      Left Ear: Tympanic membrane, ear canal and external ear normal.      Nose: Nose normal. No congestion or rhinorrhea.      Mouth/Throat:      Pharynx: Oropharynx is clear. No oropharyngeal exudate or posterior oropharyngeal erythema.   Eyes:      Conjunctiva/sclera: Conjunctivae normal.   Cardiovascular:      Rate and Rhythm: Normal rate.      Pulses: Normal pulses.   Pulmonary:      Effort: Pulmonary " effort is normal. No respiratory distress.      Breath sounds: Normal breath sounds. No stridor. No wheezing, rhonchi or rales.   Musculoskeletal:         General: Normal range of motion.      Cervical back: Normal range of motion and neck supple. No rigidity.   Lymphadenopathy:      Cervical: No cervical adenopathy.   Skin:     General: Skin is warm and dry.      Capillary Refill: Capillary refill takes less than 2 seconds.   Neurological:      Mental Status: He is alert and oriented to person, place, and time.   Psychiatric:         Mood and Affect: Mood normal.         Behavior: Behavior normal.           Review of Systems:  Review of Systems   Constitutional:  Negative for chills and fever.   HENT:  Negative for congestion, ear discharge and sore throat.    Respiratory:  Negative for cough, shortness of breath and wheezing.    Cardiovascular:  Positive for chest pain. Negative for palpitations and leg swelling.   Gastrointestinal:  Negative for abdominal pain, constipation, diarrhea, nausea, vomiting and GERD.   Genitourinary:  Negative for flank pain.   Neurological:  Negative for dizziness and headache.   Psychiatric/Behavioral:  Positive for depressed mood. Negative for sleep disturbance and suicidal ideas. The patient is nervous/anxious.               Follow Up   Return in about 1 month (around 12/5/2024) for Recheck.    Part of this note may be an electronic transcription/translation of spoken language to printed   text using the Dragon Dictation System.            Medical History:  There are no discontinued medications.   Past Medical History:    Anxiety    Back pain    CHF (congestive heart failure)    Not bad at moment, likely result of sleep apnea    Depression    Head injury    Lumbar herniated disc    Lumbosacral disc disease    Obesity    Obstructive sleep apnea    C-PAP mask not working/ not currently treating    Panic disorder    PTSD (post-traumatic stress disorder)    Spinal headache    Had  issues with neck pain    Work related injury    reinjured 8-13-15     Past Surgical History:    COLONOSCOPY    Procedure: COLONOSCOPY WITH POLYPECTOMY;  Surgeon: Pranav Granger MD;  Location: [facility] ENDOSCOPY;  Service: General;  Laterality: N/A;  DIVERTICULOSIS. COLON POLYP    ENDOSCOPY    Procedure: ESOPHAGOGASTRODUODENOSCOPY with biopsies;  Surgeon: Pranav Granger MD;  Location: [facility] ENDOSCOPY;  Service: General;  Laterality: N/A;  NORMAL    LUMBAR DECOMPRESSION    JEH  L4-5 lumbar decomp disckectomy and TLIF  sextant mtrix and stealth    LUMBAR FUSION    Procedure: RT L4-5 LUMBAR DECOMPRESSION DISCECTOMY AND TLIF SEXTANT METRIX AND STEALTH;  Surgeon: Celestino Mackay MD;  Location:  [facility];  Service:     PLANTAR FASCIA RELEASE    Procedure: LEFT FOOT ENDOSCOPIC PLANTAR FASCIOTOMY;  Surgeon: Rafy Bauer DPM;  Location: [facility] MAIN OR;  Service: Podiatry;  Laterality: Left;    SPINE SURGERY    lumbar spinal diskectomy L4/L5  L5/S1 2009    WISDOM TOOTH EXTRACTION      Family History   Problem Relation Age of Onset    Diabetes Father     Anxiety disorder Son     ADD / ADHD Son     Depression Son     Mental illness Son      Social History     Tobacco Use    Smoking status: Never     Passive exposure: Never    Smokeless tobacco: Never   Substance Use Topics    Alcohol use: No       Health Maintenance Due   Topic Date Due    Pneumococcal Vaccine 0-64 (1 of 2 - PCV) Never done    COVID-19 Vaccine (4 - 2024-25 season) 09/01/2024        Immunization History   Administered Date(s) Administered    COVID-19 (PFIZER) 12YRS+ (COMIRNATY) 01/23/2024    COVID-19 (PFIZER) Purple Cap Monovalent 04/02/2021, 04/28/2021    Flu Vaccine Intradermal Quad 18-64YR 10/19/2021    Influenza Injectable Mdck Pf Quad 10/19/2021, 10/25/2022, 09/28/2023    Influenza, Unspecified 10/25/2022    Tdap 07/25/2023       No Known Allergies

## 2024-11-06 ENCOUNTER — TELEPHONE (OUTPATIENT)
Dept: CARDIOLOGY | Facility: CLINIC | Age: 49
End: 2024-11-06
Payer: COMMERCIAL

## 2024-11-06 ENCOUNTER — TELEPHONE (OUTPATIENT)
Dept: CARDIOLOGY | Facility: CLINIC | Age: 49
End: 2024-11-06

## 2024-11-06 DIAGNOSIS — F41.9 ANXIETY: ICD-10-CM

## 2024-11-06 NOTE — TELEPHONE ENCOUNTER
[facility] received a fax that requires your attention. The document has been indexed to the patient’s chart for your review.      Reason for sending: EXTERNAL MEDICAL RECORD NOTIFICATION     Documents Description: UNABLE TO SCHEDULE-[facility]-11.6.24    Name of Sender: [employer]     Date Indexed: 11.6.24

## 2024-11-06 NOTE — TELEPHONE ENCOUNTER
Caller: Pranav Calderon    Relationship: Self    Best call back number: [phone]    What is the best time to reach you: ANY    Who are you requesting to speak with (clinical staff, provider,  specific staff member): ANY    Do you know the name of the person who called: ?    What was the call regarding: RECEIVED A CALL NOT SURE FROM WHO.  NO MESSAGE IN CHART

## 2024-11-07 RX ORDER — BUSPIRONE HYDROCHLORIDE 15 MG/1
15 TABLET ORAL DAILY PRN
Qty: 40 TABLET | Refills: 1 | Status: SHIPPED | OUTPATIENT
Start: 2024-11-07

## 2024-11-12 ENCOUNTER — TELEPHONE (OUTPATIENT)
Dept: CARDIOLOGY | Facility: CLINIC | Age: 49
End: 2024-11-12

## 2024-11-12 ENCOUNTER — TELEPHONE (OUTPATIENT)
Dept: FAMILY MEDICINE CLINIC | Age: 49
End: 2024-11-12
Payer: COMMERCIAL

## 2024-11-12 DIAGNOSIS — R07.9 CHEST PAIN, UNSPECIFIED TYPE: ICD-10-CM

## 2024-11-12 NOTE — TELEPHONE ENCOUNTER
Caller: Pranav Calderon    Relationship: Self    Best call back number: [phone]    Caller requesting test results: CT ANGIOGRAM    What test was performed: CT ANGIOGRAM    When was the test performed: YESTERDAY 11.11.2024    Where was the test performed: [facility]    Additional notes: PATIENT HAS THE RESULTS NEEDS TO DISCUSS THE MEANING. PLEASE CALL THE PATIENT TO DISCUSS.

## 2024-11-12 NOTE — TELEPHONE ENCOUNTER
Caller: Pranav Calderon    Relationship: Self    Best call back number: [phone]     Caller requesting test results: YES    What test was performed: CT SCAN    When was the test performed: 11/11/24    Where was the test performed: [facility]     Additional notes: CALLER STATED THAT HE SAW RESULTS ON MY CHART AND IS NEEDING SOME THINGS EXPLAINED.

## 2024-11-13 RX ORDER — PANTOPRAZOLE SODIUM 40 MG/1
40 TABLET, DELAYED RELEASE ORAL DAILY
Qty: 30 TABLET | Refills: 2 | Status: SHIPPED | OUTPATIENT
Start: 2024-11-13 | End: 2024-11-18

## 2024-11-18 ENCOUNTER — OFFICE VISIT (OUTPATIENT)
Dept: SLEEP MEDICINE | Facility: HOSPITAL | Age: 49
End: 2024-11-18
Payer: COMMERCIAL

## 2024-11-18 VITALS — HEART RATE: 102 BPM | OXYGEN SATURATION: 98 % | BODY MASS INDEX: 31.99 KG/M2 | HEIGHT: 69 IN | WEIGHT: 216 LBS

## 2024-11-18 DIAGNOSIS — G47.33 OSA (OBSTRUCTIVE SLEEP APNEA): Primary | ICD-10-CM

## 2024-11-18 DIAGNOSIS — Z71.3 WEIGHT LOSS COUNSELING, ENCOUNTER FOR: Primary | ICD-10-CM

## 2024-11-18 DIAGNOSIS — E66.811 CLASS 1 OBESITY DUE TO EXCESS CALORIES WITHOUT SERIOUS COMORBIDITY WITH BODY MASS INDEX (BMI) OF 31.0 TO 31.9 IN ADULT: ICD-10-CM

## 2024-11-18 DIAGNOSIS — E66.811 CLASS 1 OBESITY DUE TO EXCESS CALORIES WITH BODY MASS INDEX (BMI) OF 32.0 TO 32.9 IN ADULT, UNSPECIFIED WHETHER SERIOUS COMORBIDITY PRESENT: ICD-10-CM

## 2024-11-18 DIAGNOSIS — E66.09 CLASS 1 OBESITY DUE TO EXCESS CALORIES WITHOUT SERIOUS COMORBIDITY WITH BODY MASS INDEX (BMI) OF 31.0 TO 31.9 IN ADULT: ICD-10-CM

## 2024-11-18 DIAGNOSIS — E66.09 CLASS 1 OBESITY DUE TO EXCESS CALORIES WITH BODY MASS INDEX (BMI) OF 32.0 TO 32.9 IN ADULT, UNSPECIFIED WHETHER SERIOUS COMORBIDITY PRESENT: ICD-10-CM

## 2024-11-18 DIAGNOSIS — N52.9 ERECTILE DYSFUNCTION, UNSPECIFIED ERECTILE DYSFUNCTION TYPE: ICD-10-CM

## 2024-11-18 PROCEDURE — 99214 OFFICE O/P EST MOD 30 MIN: CPT | Performed by: PSYCHIATRY & NEUROLOGY

## 2024-11-18 PROCEDURE — G0463 HOSPITAL OUTPT CLINIC VISIT: HCPCS

## 2024-11-18 RX ORDER — SILDENAFIL CITRATE 20 MG/1
TABLET ORAL
Qty: 60 TABLET | Refills: 1 | Status: SHIPPED | OUTPATIENT
Start: 2024-11-18

## 2024-11-18 RX ORDER — PHENTERMINE HYDROCHLORIDE 37.5 MG/1
37.5 CAPSULE ORAL EVERY MORNING
Qty: 30 CAPSULE | Refills: 0 | Status: SHIPPED | OUTPATIENT
Start: 2024-11-18

## 2024-11-18 NOTE — PROGRESS NOTES
Follow Up [facility] Disorders [facility] Note       Primary Care Physician: Fauzia Ballard MD    Interval History:   The patient is a 49 y.o. male      History of Present Illness  The patient presents for evaluation of sleep apnea.    He has attempted to use a CPAP machine four times but has been unable to tolerate it due to headaches. He also tried a BiPAP machine but found it uncomfortable, likening it to having a basketball around his head. He has tried wearing the CPAP mask during the day while watching football but can only tolerate it for 30 minutes.    He has been experiencing chest pain, which he attributes to anxiety. He has undergone a CT angiogram of the heart, which revealed minimal plaque.    He has a history of a curved spine and degenerative disc disease, which causes him neck pain. This makes it difficult for him to wear the CPAP mask or even a hat. He has two rods and four screws in his lower back.         Downloaded PAP Data Evaluated For Therapeutic Response and Compliance:  DME is [employer].  Downloads between 10/16/2024 and 11/14/2024.  Average usage is 46 minutes and 16 out of 30 days.  Average AHI is 2.1.  PAP pressure is 5.5 CWP.    The patient uses a fullface interface.    Review of Systems:    A complete review of systems was done and all were negative with the exception of intolerance of CPAP headgear, resulting headache    Social History:    Social History     Socioeconomic History    Marital status:     Number of children: 1    Highest education level: High school graduate   Tobacco Use    Smoking status: Never     Passive exposure: Never    Smokeless tobacco: Never   Vaping Use    Vaping status: Never Used   Substance and Sexual Activity    Alcohol use: No    Drug use: Never    Sexual activity: Not Currently     Partners: Female     Birth control/protection: None       Allergies:  Patient has no known allergies.     Medication Review:  Reviewed.      Vital Signs:    Vitals:     "11/18/24 1117   Pulse: 102   SpO2: 98%   Weight: 98 kg (216 lb)   Height: 175.3 cm (69.02\")     Body mass index is 31.88 kg/m².  .BMIFOLLOWUP    Physical Exam:    Constitutional:  Well developed 49 y.o. male that appears in no apparent distress.  Awake & oriented times 3.  Normal mood with normal recent and remote memory and normal judgement.  Eyes:  Conjunctivae normal.      Self-administered Carman Sleepiness Scale test results: 13  0-5 Lower normal daytime sleepiness  6-10 Higher normal daytime sleepiness  11-12 Mild, 13-15 Moderate, & 16-24 Severe excessive daytime sleepiness       I have reviewed the above results and compared them with the patient's last downloads and reviewed with the patient.    Impression:     Encounter Diagnoses   Name Primary?    KAILASH (obstructive sleep apnea) Yes    Class 1 obesity due to excess calories with body mass index (BMI) of 32.0 to 32.9 in adult, unspecified whether serious comorbidity present          Assessment & Plan  1. Sleep Apnea.  The patient has tried CPAP four times but is unable to tolerate it due to headaches and neck pain. Alternative treatment options were discussed, including a mandibular advancement device and the Inspire device. He will be referred to Dr. Campos for further evaluation and discussion of these options. If the Inspire device is considered, a consultation with Dr. Sparrow will be arranged for follow-up care.           Good sleep hygiene measures should be maintained.  Weight loss would be beneficial in this patient who is obesity class I by Body mass index is 31.88 kg/m².        This is the patient's fourth attempt at pressure support therapy and he is intolerant of it.  His AHI and BMI qualify him for an inspire device.  I will accordingly make a referral to Dr. Walker to discuss that option.    I answered all of the patient's questions.  The patient will call the [facility] Center for any problems and will follow up as needed.  If the patient does " get a inspire device I will have him follow-up with Dr. Gilmore who is going to have inspire device clinic..    Patient or patient representative verbalized consent for the use of Ambient Listening during the visit with  Kartik Pal MD for chart documentation. 11/18/2024  11:27 EST           Kartik Pal MD  Sleep Medicine  11/18/24  11:26 EST

## 2024-11-19 ENCOUNTER — OFFICE VISIT (OUTPATIENT)
Dept: BEHAVIORAL HEALTH | Facility: CLINIC | Age: 49
End: 2024-11-19
Payer: COMMERCIAL

## 2024-11-19 VITALS
DIASTOLIC BLOOD PRESSURE: 70 MMHG | HEIGHT: 69 IN | WEIGHT: 209.8 LBS | BODY MASS INDEX: 31.07 KG/M2 | SYSTOLIC BLOOD PRESSURE: 120 MMHG | HEART RATE: 86 BPM

## 2024-11-19 DIAGNOSIS — F33.1 MAJOR DEPRESSIVE DISORDER, RECURRENT EPISODE, MODERATE: ICD-10-CM

## 2024-11-19 DIAGNOSIS — F41.1 GENERALIZED ANXIETY DISORDER: Primary | ICD-10-CM

## 2024-11-19 DIAGNOSIS — Z79.899 MEDICATION MANAGEMENT: ICD-10-CM

## 2024-11-19 DIAGNOSIS — F40.10 SOCIAL ANXIETY DISORDER: ICD-10-CM

## 2024-11-19 DIAGNOSIS — Z71.89 MEDICATION CARE PLAN DISCUSSED WITH PATIENT: ICD-10-CM

## 2024-11-19 RX ORDER — BUSPIRONE HYDROCHLORIDE 15 MG/1
7.5 TABLET ORAL 2 TIMES DAILY
Start: 2024-11-19

## 2024-11-19 NOTE — PROGRESS NOTES
"  Subjective   Pranav Calderon is a 49 y.o. male who presents today for follow up    Referring Provider:  No referring provider defined for this encounter.    Chief Complaint:  Anxiety, depression, medication management, medication care plan discussed     Answers submitted by the patient for this visit:  Primary Reason for Visit (Submitted on 11/16/2024)  What is the primary reason for your visit?: Depression  Depression (Submitted on 11/16/2024)  Chief Complaint: Depression  Visit: follow-up  Frequency: constantly  Severity: severe  excessive worry: Yes  insomnia: Yes  irritability: Yes  malaise/fatigue: Yes  obsessions: Yes  hypersomnia: Yes  difficulty controlling mood: Yes  difficulty staying asleep: No  difficulty falling asleep: No  Hours of sleep per night: 6 Hours  Medication compliance: %  Aggravated by: family issues, social activities, work stress  Additional information: Having panic attacks too.      History of Present Illness:    11/19/24:  Patient presents today in office for an earlier appointment due to worsened anxiety and depression, \"last week was the worst week I had\", patient had to take off work 3 days and left early one day, reports going home and cried without known reason, patient is adherent with Prozac. Over the weekend was able to \"reset\" and returned to work yesterday. Patient recalls having difficulty concentrating, felt some dizziness, and overall felt down. Then began to think of requiring psychotropic medications to maintain stable mood.  Felt anxious about anyone approaching him to talk.  Patient denies a specific trigger of past events that could have been reason for depressive symptoms last week. \"Its not one thing, its many things.\"  Recalls discussion with therapist, several issues occur at work and has been catastrophizing. Some days he feels the Prozac works and other days not.  Continues to see therapist 1-2 times per month.     In regards to PHQ9 screening question " "of thoughts of being better off dead or hurting himself in some way, patient reports thoughts of being better off dead last week, denies thoughts of killing himself, \"if I was not here, everyone would be better off and not have to worry so much, I never thought of doing it intentionally.\" Patient is adamant of denial of suicidal thoughts, plan, rumination, or planning and is convincing.    Since last visit noted PCP added Buspar 15 mg daily as needed, which patient reports taking only as needed, and took one night last week, then the following few days felt he was more isolated, felt more tired.      Depression: Patient complains of depression. He complains of anhedonia, depressed mood, difficulty concentrating, fatigue, feelings of worthlessness/guilt, and insomnia   Anxiety:  The patient endorses to the following symptoms of anxiety including: excessive anxiety and worry about a number of events or activities for more days than not, being easily fatigued, difficulty concentrating or mind going blank, irritability, muscle tension, and sleep disturbance which have caused impairment in important areas of daily functioning.    X-Keyx-Xbxnknoc/Recent-Self-Report    Question 11/19/2024  8:39 AM EST - Filed by Meri Perales MA   In the past month, have you wished you were dead or wished you could go to sleep and not wake up? Yes   In the past month, have you actually had any thoughts about killing yourself? No   In your lifetime, have you ever done anything, started to do anything, or prepared to do anything to end your life? No       8/9/24:    Answers submitted by the patient for this visit:  Primary Reason for Visit (Submitted on 8/2/2024)  What is the primary reason for your visit?: Other  Other (Submitted on 8/2/2024)  Please describe your symptoms.: Anxiety, depression,  ptsd sometimes causing chest pain, possible adhd.  Have you had these symptoms before?: Yes  How long have you been having these symptoms?: " "Greater than 2 weeks  Please list any medications you are currently taking for this condition.: Fluoxetine 80mg, An. Flonase    Patient presents today in office for a requested ADHD evaluation.  Patient noticed there were things he rarely noticed previously had subsided while taking Phentermine for weight loss.  \"If anyone reminds me for appointments or wife will remind me and say again \"I can't wait for tomorrow night\" and forgets we had plans.\"  Patient last appointment for therapy was on calendar on phone though phone  and had been using another phone which the calendar information did not transfer and patient had missed the appointment in .  Patient reports upon arriving to work, he would head directly to assigned area, however, while taking Phentermine he was able to be more social, felt less anxious, carry on conversations.  \"I didn't have to think for words to describe things, now I have to reach out to find words I need to use. Things I really didn't notice that badly until I was on it and I felt more myself.\"  Patient recalls while in highschool would click teeth together or tapped pencil to his head, fidgeting in seat when he felt anxious.   \"Didn't over think about bills, teenager stuff and work problems.\"     \"It's more about how I felt on phentermine than to have evaluation for ADHD, and I didn't notice I had ADHD until I took it.  I didn't take any naps, I wasn't bothered by things.\"    ADHD:   Elementary school:   Grades: unable to recall    Special classes or failures: no and no  Got in trouble: sometimes couldn't sit still, had hands smacked  Referral for ADHD testing: no  Fhx: son-diagnosed around 7-8th grade, treated with medication-Lamictal and quila chew   Presently:  Problems with attention to detail: No (denies submitting incomplete work, goes back to fix mistakes) describes as: \"while placing barn doors up in home, had to keep going back and getting aligned, while taking phentermine I " "didn't notice it or I didn't care if I did or didn't go over the yard again because I would have to go back and cut places I missed .\"  Problems with sustained attention: No (Difficulty remaining focused during lengthy meetings over 15-20 minutes.\"I want to go to work\".as sometimes will have meetings and patient wants to get started on his day.   Problems listening when spoken to directly: No (describes situations where he has difficulty listening while preoccupied in another task, if there are no obvious distraction is able to listen.) \"will hear them talking, I can't resonate what they are saying, have to ask them to repeat it. Especially if I am doing something already. 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 Putting it back together, \"at work is not as bad because I am getting paid. \"  Avoids/Dislikes/Reluctant to engage in tasks that require sustained mental effort:No thinking about putting stuff away in room, hasn't put larger fitting clothes away since weight loss, able to clean garage due to taking phentermine.  Easily distracted: Yes (noises, TV, phone, external factors)   Forgetting things: No (paying bills-does all on mobile roxanna so its not an issue, keeping appointments-with reminders has no difficulty)   Losing things: Sometimes-has a designated place for keys, sometimes doesn't place in area, mostly keys are misplaced often, wife had found keys in the garage and patient did not recall leaving them in the garage, other items denied.  Hard to organize: Sometimes (admits to difficulty keeping things in order, hasn't been able to adhere to calorie counting " "roxanna \"I can't keep with it for some reason.\" Denies difficulty managing sequential tasks, poor time management, nor failure to meet deadlines)   Talks a lot and cutting people off: Sometimes, not consistently \"when I get to talking but a lot of times anti-social behavior kicks in;\" and  sometimes  Drifts off during conversations: Sometimes   Difficulty with Reading: \"I'm not into reading\"  Xiii. Difficulty watching TV/Movies: No- recently doesn't finish You tube videos; can sit through movies      6/3/24:    Answers submitted by the patient for this visit:  Primary Reason for Visit (Submitted on 5/27/2024)  What is the primary reason for your visit?: Depression  Depression (Submitted on 5/27/2024)  Chief Complaint: Depression  Visit: follow-up  Frequency: constantly  Severity: severe  excessive worry: Yes  insomnia: No  irritability: Yes  malaise/fatigue: No  obsessions: No  hypersomnia: No  difficulty controlling mood: Yes  Medication compliance: %  Side effects: sexual problems  Aggravated by: family issues, social activities, work stress    Patient presents today in office reports he is somewhat anxious about PCP stopping phentermine for 3 months and will then restart, patient was 241 lb. On 1/23/24, and today is 225 lbs, though patient weighed self at home and was 219 lb this morning without clothes or phone, keys, as he did today.  Patient has been practicing mindfulness regarding food, most recently has stopped eating pasta and pizza, and is now avoiding soda's, drinks more water.  Patient feels his self confidence has improved and see's results. \"The Prozac has been doing great.\"  Patient didn't have 1 time to take last week when they went to [place], and started feeling panic about not being able to find a place to park, loud music worsened anxiety.   Re-weighed patient without cell phone, keys, and shoes: 223.0 lbs. Overall mood has been stable with Prozac.    Depression: Patient complains of " "depression. He complains of anhedonia, depressed mood, difficulty concentrating, fatigue, and feelings of worthlessness/guilt  Anxiety:  The patient endorses significant symptoms of anxiety including:  mild anxiousness, being easily fatigued, and difficulty concentrating or mind going blank which have caused impairment in important areas of daily functioning.      1/30/24:    Answers submitted by the patient for this visit:  Primary Reason for Visit (Submitted on 1/23/2024)  What is the primary reason for your visit?: Other  Other (Submitted on 1/23/2024)  Please describe your symptoms.: Depression, anxiety, PTSD  Have you had these symptoms before?: Yes  How long have you been having these symptoms?: Greater than 2 weeks  Please list any medications you are currently taking for this condition.: Fluoxetine 80mg  Please describe any probable cause for these symptoms. : Work, my past    Patient presents today in office, at last visit Prozac was increased from 60 to 80 mg for which patient reports has been effective, patient found another bottle of the 20 mg, and has been taking 4 of the 20 mg.  Reports less anxiety and depressive symptoms.  Denies side effects since dose increase, sleeping well.     Noted patient started Phentermine per PCP which was dispensed 1/23/24 for weight loss. Patient has decreased soda intake, patient noticing weight loss which has helped with mood improvement.  Patient was also started on Buspar 15 mg twice daily per PCP due to having 2 panic attacks over the last month, which patient has not taken.     Patient was in bed laying flat about to go to sleep and began to have SOA, feeling panic, started shaking.  Patient got out of bed, drank some water and ate a snack and sat on couch to calm down, and was able to recover, \"it lasted no more than 5-10 minutes.\"    Continues to see therapist monthly, which patient finds therapeutic and does not desire biweekly at this time.     12/7/23:  " "  Answers submitted by the patient for this visit:  Primary Reason for Visit (Submitted on 12/5/2023)  What is the primary reason for your visit?: Other  Other (Submitted on 12/5/2023)  Please describe your symptoms.: Medicine check  Have you had these symptoms before?: Yes  How long have you been having these symptoms?: Greater than 2 weeks  Please list any medications you are currently taking for this condition.: Fluoxetine 20 MG (x3)  Please describe any probable cause for these symptoms. : Stress anxiety depression PTSD    Patient presents today in office, at last visit patient was reminded to take total dose of Prozac 60 mg at one time in the morning at 11 am as patient has been taking 40 mg at 11 am and 20 mg in the evening 530-6 pm. Which patient admits to adherence. Patient reports Prozac has been helpful in managing PTSD and anxiety, though admits to low energy.  Patient had felt fatigue prior to dose time changes.     Patient has been working more hours, though even while off and rested and while in [place] in 8/2023, felt didn't want to go out to [place] and wanted to stay indoors, though uncertain if \"its just me. Seems I wasn't excited to go anywhere, I was on vacation.\"      Patient no longer having chest pain, with anxiety, able to talk to people easier now.     Patient was able to see therapist since last visit.  Patient has another visit next week, attending monthly.      Patient had work issue in 2/2022 and when patient did have interactions with those individuals would experience physical symptoms of anxiety. Before the Prozac would avoid interactions with that individual, and now able to not be bothered.   In the past while taking Prozac years ago does not recall experiencing anhedonia and fatigue, though symptoms have improved, they could be better.     Depression: Patient complains of depression. He complains of anhedonia, depressed mood, difficulty concentrating, fatigue, and feelings of " worthlessness/guilt  Anxiety:  The patient endorses significant symptoms of anxiety including:  anxiousness, being easily fatigued, difficulty concentrating or mind going blank, and irritability which have caused impairment in important areas of daily functioning.      8/28/23:    Answers submitted by the patient for this visit:  Primary Reason for Visit (Submitted on 8/21/2023)  What is the primary reason for your visit?: Other  Other (Submitted on 8/21/2023)  Please describe your symptoms.: Anxiety (causing chest pain) PTSD Depression  Have you had these symptoms before?: Yes  How long have you been having these symptoms?: Greater than 2 weeks  Please list any medications you are currently taking for this condition.: Fluoxetine 60mg  Please describe any probable cause for these symptoms. : Work, stress, sleep apnea    Patient presents today in office, at last visit Prozac was increased from 40 to 60 mg and was advised to take later in the day due to patient working 2nd shift and had reported feeling the medication was corbin off and not lasting 12 hrs. Reports improvement in mood, has been taking 40 mg of old supply at 11 am and takes 20 mg while on break at work between 530-6 pm.  Denies side effects, tolerating well.    Patient had some increased stress regarding finances as truck needed repairs. Work is better.  Feels he is able to handle stress at home better than the past.    Patient has scheduled an appointment with therapist 9/13/23.       6/20/23:    Answers submitted by the patient for this visit:  Primary Reason for Visit (Submitted on 6/13/2023)  What is the primary reason for your visit?: Other  Other (Submitted on 6/13/2023)  Please describe your symptoms.: Anxiety  depression  PTSD  Have you had these symptoms before?: Yes  How long have you been having these symptoms?: Greater than 2 weeks  Please list any medications you are currently taking for this condition.: Fluoxetine 40  Please describe any  "probable cause for these symptoms. : Work, childhood, stress    Patient presents today in office reports having days Prozac is not working due to symptoms of lack of focus, chest discomfort due to anxiety. Though symptoms are not as frequent as with 20 mg Prozac.  Has days of being more \"anti-social\" and good days, and will compensate low energy levels with caffeine intake.  \"My brain makes it feel like a bad day, nothing out of the ordinary making it a bad day.\"  Patient works 4p-12 midnight, and takes dose upon awakening at 9-10 am, and around 10-11 pm feels anxiety starts. Since starting Prozac feels needs a nap in the afternoon, though uncertain as patient started 2nd shift 10/2022 as well as Prozac vs KAILASH which is untreated.     Patient was advised to schedule appointment with therapist at last visit, however, has not heard back since leaving a message.     Depression: Patient complains of depression. He complains of anhedonia, depressed mood, difficulty concentrating, fatigue, feelings of worthlessness/guilt, and insomnia  Anxiety:  The patient endorses significant symptoms of anxiety including: excessive anxiety and worry about a number of events or activities for more days than not, restlessness or feeling keyed up, being easily fatigued, difficulty concentrating or mind going blank, irritability, and sleep disturbance which have caused impairment in important areas of daily functioning.      3/21/23:    Answers for HPI/ROS submitted by the patient on 3/14/2023  What is the primary reason for your visit?: Other  Please describe your symptoms.: Depression, anxiety,  ptsd  Have you had these symptoms before?: Yes  How long have you been having these symptoms?: Greater than 2 weeks  Please list any medications you are currently taking for this condition.: Fluoxetine 40mg  Patient presents today in office, at last visit Prozac was increased from 20 to 40 mg for which patient reports \"I am doing well. I don't " "think my anxiety will go away completely, but it is definitely better, I don't dwell on it.\" Patient reports overall Prozac has been very effective and is pleased with results.  Sleep has improved due to decreased anxiety, able to calm mind.      Patient had an appointment in early 1/2023 with therapy which was cancelled by provider and has not received a call to reschedule, which patient has not attempted to reschedule, though plans to do so today.      Patient has felt anxiety prior to going into work, though quickly subsides, and patient feels may be due to the idea of just going into work.  Overall mood has improved in regards to depression.     1/31/23:  Patient presents today in office \"I feel like the problems are still there, but I am dealing with them better, I am not stressing about it as much at home.  Started Prozac at last visit, for which patient feels has helped improve mood.  Patient explains in Nov 2022 had some financial concerns, and was able to refinance both vehicles which lowered monthly payments, and \"I felt like if I didn't have the Prozac I wouldn't have been able to do that.\"      Patient denies SI, though at times has thoughts \"if I was gone they would be happier financially, because of monthly child support.\"  Son is 16 currently. Denies feelings of harming self or others, denies rumination of thoughts or action plan.      Patient takes Prozac later in morning at 11 am-12 noon due to working 2nd shift, though at times will not go to sleep until 3 am.  Patient had difficulty with sleep prior to starting Prozac as patient has KAILASH and is unable to wear the mask.  Recently bought a mouth guard to help with snoring.  Denies gasping for air, though wife says patient has in the past. \"I do snore really loud.\"    Patient continues to see therapist, which is going well.     Depression: Patient complains of depression. He complains of anhedonia, depressed mood, fatigue, feelings of " "worthlessness/guilt and insomnia    Anxiety:  The patient endorses significant symptoms of anxiety including: excessive anxiety and worry about a number of events or activities for more days than not, being easily fatigued, difficulty concentrating or mind going blank, irritability and sleep disturbance which have caused impairment in important areas of daily functioning.        10/18/22:  Patient presents today in office reporting tolerating Lexapro initially, though having difficulty falling asleep, and approximately 1 week ago stopped taking Lexapro and noticed immediately those symptoms resolved.  As now patient is not having difficulty going to sleep.  Patient tried to take lower dose of Hydroxyzine 10 mg and continued to feel groggy the following day.     Patient reports started therapy with Elyssa and has upcoming appointment 10/25/22.     Patient reports switched shifts from 8a-4pm to 4 pm-12 midnight, which started 2 weeks ago, continues to have chest pain with anxiety while driving, sleep has improved, however, depression rates 6 our of 10, and anxiety 8or9 /10.  \"This hasn't been the best year.\"  Difficulty's with employer, finances, son, life stressors.     Patient further explains he was having difficulty sleeping while taking Lexapro at lower dose, though open to possible reason related to anxiety.   Recalls while taking Prozac also took Hydroxyzine at night which suspect feelings of grogginess, \"feeling so crappy was a combination of the two together.\"     9/13/22:  Patient presents today in office reporting  \"Effexor not working for me. Sweating a lot and feel zoned out\" and felt bloated, patient was started on 37.5 mg of Effexor at last appointment and decreased as needed Hydroxyzine from 25 to 10 mg by mouth every 6 hr as needed.  Patient reports stopped taking Effexor XR after almost 2 weeks due to adverse side effects.  Once patient stopped Effexor XR the symptoms resolved.     Patient tried " "lower dose of Hydroxyzine of 10 mg after stopping Effexor XR for 2 days and continued to feel tired, though not able to get to sleep, patient took off from work to help \"destress and difficulty sleeping.\" Patient has been off for approximately 2 weeks including scheduled off days.      Patient was able to schedule appointment with therapist for 9/28/22 at 2 pm.      Patient wife has been driving , although, patient did  son from school, and able to keep mind off of anxiety while talking to son.  School is 5-10 minutes away from patient home.     Patient does suffer from neck pain which worsens with CPAP/BiPap mask despite trying to loosen strap.  Not able to tolerate.    Patient most impairing symptom is the depression and anxiety as patient believes \"insomnia is a product of the anxiety.\" Patient complaint of inability to calm mind down, \"I don't try to think about it, it just hits me.\"       8/9/22:  INITIAL EVAL  Patient presents today in office with a history of anxiety and depression.   Patient began treatment approximately 10-12 yrs ago with PCP's, has never seen a behavioral health provider.  Patient initial demeanor presents as anxious, somewhat guarded, and flat, however, as visit progressed patient was very open with current stressors and feelings.      Depression: Patient complains of depression. He complains of anhedonia, depressed mood, difficulty concentrating, fatigue, feelings of worthlessness/guilt and negative thoughts of feeling family would be better off without him, though denies actual suicidal thoughts.  Patient admits to \"if I was gone everyone would be better off, I would never do it, I just think about it, fleeting throughout different parts of the day.\"  Patient reports while working it is easier, however, endorses to increased anxiety related to work.  \"a lot of the issues stem from work as well.\"  Denies action plan.  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  Patient was then given some acid reflex medications and had a cardiac workup which was negative.  Patient was then started on Prozac 20 mg 4/26/22.      Current manager is different than before, has had past problems with current female supervisor in 12/2020 while helping putting cylinders away, co-worker put in wrong bins but patient name was on the work, however, patient was punished and had told union, \" she had taken me out of the computer and she refused to put me back in the computer, had arguments prior to this incident,\" patient feels retaliation from this supervisor which has weighed on patient as supervisor now giving patient the \"cold shoulder\". And has difficulty with anxiety.  Patient reports past employer at [employer] did not have any problems, however, patient feels he does not share same  interests as fellow employees.  Patient has been at [employer] for 23 yrs, \"and I still feel like an outsider.\"     Patient admits to having feelings of chest pressure and minimal sweating to palms while driving, \"anytime I get an anxious or stressful situation almost,\" patient takes Hydroxyzine 25 mg and feels in zombie state, and 3-4 " "hrs later will take Buspar 10 mg which causes drowsiness, denies dizziness.  Patient believes anxiety symptoms while driving began after incident earlier this year with the forklift, March 2022.    Patient admits to only taking Buspar as needed, does not need or take while off work, and on weekends takes once if working as there is a \"lighter management\" .  Patient reports he is unable to take Prozac at exact same time with Buspar and hydroxyzine due to feelings of \"spacing out\".   Patient taking Prozac every morning which patient states, \"it makes me a little more social.  Not so much while driving to work, it's only 2 min.from my house. 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 \"High traffic area gets my chest going and I get anxious.\"      PHQ-9 Depression Screening  PHQ-9 Total Score:   11/18/2024 24   Little interest or pleasure in doing things? (Patient-Rptd) Almost all   Feeling down, depressed, or hopeless? (Patient-Rptd) Almost all   PHQ-2 Total Score (Patient-Rptd) 6   Trouble falling or staying asleep, or sleeping too much? (Patient-Rptd) Almost all   Feeling tired or having little energy? (Patient-Rptd) Almost all   Poor appetite or overeating? (Patient-Rptd) Almost all   Feeling bad about yourself - or that you are a failure or have let yourself or your family down? (Patient-Rptd) Almost all   Trouble concentrating on things, such as reading the newspaper or watching television? (Patient-Rptd) Almost all   Moving or speaking so slowly that other people could have noticed? Or the opposite - being so fidgety or restless that you have been moving around a lot more than usual? (Patient-Rptd) Several days   Thoughts that you would be better off dead, or of hurting yourself in some way? (Patient-Rptd) Over half   PHQ-9 Total Score (Patient-Rptd) 24   If you checked off any problems, how difficult have these problems made it for you to do " your work, take care of things at home, or get along with other people? (Patient-Rptd) Extremely difficult        BEE-7  Feeling nervous, anxious or on edge: (Patient-Rptd) Nearly every day  Not being able to stop or control worrying: (Patient-Rptd) More than half the days  Worrying too much about different things: (Patient-Rptd) Nearly every day  Trouble Relaxing: (Patient-Rptd) Several days  Being so restless that it is hard to sit still: (Patient-Rptd) Not at all  Feeling afraid as if something awful might happen: (Patient-Rptd) Nearly every day  Becoming easily annoyed or irritable: (Patient-Rptd) Nearly every day  BEE 7 Total Score: (Patient-Rptd) 15  If you checked any problems, how difficult have these problems made it for you to do your work, take care of things at home, or get along with other people: (Patient-Rptd) Extremely difficult 11/18/2024    The following portions of the patient's history were reviewed and updated as appropriate: allergies, current medications, past family history, past medical history, past social history, and past surgical history.     Past Surgical History:  Past Surgical History:   Procedure Laterality Date    COLONOSCOPY N/A 7/26/2024    Procedure: COLONOSCOPY WITH POLYPECTOMY;  Surgeon: Pranav Granger MD;  Location: [facility] END[facility]COPY;  Service: General;  Laterality: N/A;  DIVERTICULOSIS. COLON POLYP    ENDOSCOPY N/A 7/26/2024    Procedure: ESOPHAGOGASTRODUODENOSCOPY with biopsies;  Surgeon: Pranav Granger MD;  Location: [facility] ENDOSCOPY;  Service: General;  Laterality: N/A;  NORMAL    LUMBAR DECOMPRESSION  03/09/2016    [facility]  L4-5 lumbar decomp disckectomy and TLIF  sextant mtrix and stealth    LUMBAR FUSION Right 03/09/2016    Procedure: RT L4-5 LUMBAR DECOMPRESSION DISCECTOMY AND TLIF SEXTANT METRIX AND STEALTH;  Surgeon: Celestino Mackay MD;  Location: [facility] OR;  Service:     PLANTAR FASCIA RELEASE Left 02/09/2021    Procedure: LEFT FOOT ENDOSCOPIC  PLANTAR FASCIOTOMY;  Surgeon: Rafy Bauer DPM;  Location: [facility] MAIN OR;  Service: Podiatry;  Laterality: Left;    SPINE SURGERY      lumbar spinal diskectomy L4/L5  L5/S1 2009    WISDOM TOOTH EXTRACTION         Problem List:  Patient Active Problem List   Diagnosis    Lumbosacral disc disease    Work related injury    Post-operative state    Decreased sensation    Low back pain    Vitamin D deficiency    Acquired hammer toe of left foot    Cavus deformity of left foot    Hyperkeratosis    Pain in left foot    Plantar fasciitis    Dysthymia    Anxiety    Major depressive disorder with single episode, in partial remission    Seasonal allergic rhinitis due to pollen    Class 2 obesity due to excess calories without serious comorbidity with body mass index (BMI) of 36.0 to 36.9 in adult    Pruritus    Screen for colon cancer    Overweight    Long-term use of high-risk medication    Allergic rhinitis    Gastroesophageal reflux disease without esophagitis    Gastroesophageal reflux disease with esophagitis without hemorrhage    Epigastric pain    Screening for malignant neoplasm of colon    Chest pain, atypical    Diastolic dysfunction       Allergy:   No Known Allergies     Discontinued Medications:  Medications Discontinued During This Encounter   Medication Reason    busPIRone (BUSPAR) 15 MG tablet Dose adjustment                 Current Medications:   Current Outpatient Medications   Medication Sig Dispense Refill    Azelastine-Fluticasone 137-50 MCG/ACT suspension USE 1 SPRAY(S) IN EACH NOSTRIL TWICE DAILY      busPIRone (BUSPAR) 15 MG tablet Take 0.5 tablets by mouth 2 (Two) Times a Day. for anxiety  Indications: Anxiety Disorder      FLUoxetine (PROzac) 40 MG capsule Take 2 capsules by mouth Every Morning for 180 days. Indications: Generalized Anxiety Disorder, Major Depressive Disorder 180 capsule 1    phentermine 37.5 MG capsule Take 1 capsule by mouth Every Morning. 30 capsule 0    sildenafil (REVATIO) 20  MG tablet 1-5 tablets po 1 hour prior to intercourse, must seek emergency evaluation if erection is maintained > 4 hours 60 tablet 1     No current facility-administered medications for this visit.       Past Medical History:  Past Medical History:   Diagnosis Date    Anxiety     Back pain     CHF (congestive heart failure) 2022    Not bad at moment, likely result of sleep apnea    Depression     Head injury     Lumbar herniated disc     Lumbosacral disc disease     Obesity     Obstructive sleep apnea     C-PAP mask not working/ not currently treating    Panic disorder     PTSD (post-traumatic stress disorder)     Spinal headache 2001    Had issues with neck pain    Work related injury 10/30/2014    reinjured 8-13-15       Past Psychiatric History:  Began Treatment: approximately 10 or more years ago with  PCP at the time  Diagnoses:Depression, Anxiety and suspected PTSD  Psychiatrist:Denies  Therapist: 2016 through workman's comp prior to back surgery  Admission History:Denies  Medication Trials: Xanax and Zoloft initally [id number] with -ineffective; 10-12 yrs ago placed on Prozac-ineffective previously; Effexor XR 8/9-9/13/22 sweating,zoned out,abd bloating; Buspar 10 mg as needed -drowsiness; Hydroxyzine-drowsiness with low dose of 10 mg, next day grogginess ; Lexapro-insomnia after 5 weeks, possible related to anxiety, though self stopped mid Oct.2022    Self Harm: Denies  Suicide Attempts:Denies    Substance Abuse History:   Types:Denies all, including illicit  Withdrawal Symptoms:Denies  Longest Period Sober:Not Applicable   AA: Not applicable     Social History: As of 8/9/24  Martial Status:  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, 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)  Kids:Yes or If so, how many 1 son  age 19 will be 20 10/2024   House:Lives in a house   History: Denies  Access to Guns:  No    Social History     Socioeconomic History    Marital status:      Number of children: 1    Highest education level: High school graduate   Tobacco Use    Smoking status: Never     Passive exposure: Never    Smokeless tobacco: Never   Vaping Use    Vaping status: Never Used   Substance and Sexual Activity    Alcohol use: No    Drug use: Never    Sexual activity: Not Currently     Partners: Female     Birth control/protection: None       Family History:   Suicide Attempts: Denies  Suicide Completions:Denies      Family History   Problem Relation Age of Onset    Diabetes Father     Anxiety disorder Son     ADD / ADHD Son     Depression Son     Mental illness Son        Developmental History:   Born: [place]  Siblings:1 brother  Childhood:  physical,mental-by father  High School:Completed  College:Denies    Mental Status Exam:   Hygiene:   good  Cooperation:  Cooperative  Eye Contact:  Good  Psychomotor Behavior:  Appropriate  Affect:  Appropriate  Mood: depressed and anxious   Speech:  Normal  Thought Process:  Goal directed  Thought Content:  Mood congruent  Suicidal:  None  Homicidal:  None  Hallucinations:  None  Delusion:  None  Memory:  Intact  Orientation:  Person, Place, Time and Situation  Reliability:  good  Insight:  Good  Judgement:  Good  Impulse Control:  Good  Physical/Medical Issues:  Yes KAILASH-untreated due to claustraphobia with mask, L-Spine back pain,GERD      Review of Systems:  Review of Systems   Constitutional:  Positive for fatigue. Negative for diaphoresis.   HENT:  Negative for drooling.    Eyes:  Negative for visual disturbance.   Respiratory:  Positive for apnea. Negative for cough and shortness of breath.         KAILASH, unable to tolerate mask, snores loudly   Cardiovascular:  Negative for chest pain, palpitations and leg swelling.   Gastrointestinal:  Negative for nausea and vomiting.   Endocrine: Negative for cold intolerance and heat intolerance.   Genitourinary:  Negative for difficulty urinating.   Musculoskeletal:  Negative for joint swelling.  "  Allergic/Immunologic: Negative for immunocompromised state.   Neurological:  Negative for dizziness, seizures, speech difficulty and numbness.   Psychiatric/Behavioral:  Positive for decreased concentration and sleep disturbance. Negative for agitation, confusion, hallucinations, self-injury and suicidal ideas. The patient is nervous/anxious. The patient is not hyperactive.          Physical Exam:  Physical Exam  Psychiatric:         Attention and Perception: Attention and perception normal.         Mood and Affect: Mood is anxious and depressed.         Speech: Speech normal.         Behavior: Behavior normal. Behavior is cooperative.         Thought Content: Thought content normal. Thought content does not include suicidal ideation. Thought content does not include suicidal plan.         Cognition and Memory: Cognition and memory normal.         Judgment: Judgment normal.         Vital Signs:   /70   Pulse 86   Ht 175.3 cm (69.02\")   Wt 95.2 kg (209 lb 12.8 oz)   BMI 30.96 kg/m²      Office notes from care team reviewed:  Date of Service: 11/18/24 OV with  with [facility]  Date of Service: 11/05/24 OV with  with [facility] [place]   Date of Service: 10/02/24 OV with  with [facility]      Lab Results: Reviewed  Office Visit on 11/05/2024   Component Date Value Ref Range Status    Amphetamine Screen, Urine 11/05/2024 Negative  Negative Final    Barbiturates Screen, Urine 11/05/2024 Negative  Negative Final    Buprenorphine, Screen, Urine 11/05/2024 Negative  Negative Final    Benzodiazepine Screen, Urine 11/05/2024 Negative  Negative Final    Cocaine Screen, Urine 11/05/2024 Negative  Negative Final    MDMA (ECSTASY) 11/05/2024 Negative  Negative Final    Methamphetamine, Ur 11/05/2024 Negative  Negative Final    Morphine/Opiates Screen, Urine 11/05/2024 Negative  Negative Final    Methadone Screen, Urine 11/05/2024 Negative  Negative Final    Oxycodone " Screen, Urine 11/05/2024 Negative  Negative Final    Phencyclidine (PCP), Urine 11/05/2024 Negative  Negative Final    THC, Screen, Urine 11/05/2024 Negative  Negative Final    Lot Number 11/05/2024 [id number]   Final    Expiration Date 11/05/2024 7/4/26   Final   Ancillary Procedure on 09/18/2024   Component Date Value Ref Range Status    EF(MOD-bp) 09/18/2024 61.0  % Final    LVIDd 09/18/2024 4.9  cm Final    LVIDs 09/18/2024 3.6  cm Final    IVSd 09/18/2024 1.0  cm Final    LVPWd 09/18/2024 1.0  cm Final    MV E max aden 09/18/2024 60.0  cm/sec Final    MV A max aden 09/18/2024 51.0  cm/sec Final    MV dec time 09/18/2024 207  sec Final    MV E/A 09/18/2024 1.0   Final    IVRT 09/18/2024 90.0  ms Final    LA ESV Index (BP) 09/18/2024 22.0  ml/m2 Final    Med Peak E' Aden 09/18/2024 6.00  cm/sec Final    Lat Peak E' Aden 09/18/2024 13.0  cm/sec Final    Avg E/e' ratio 09/18/2024 6.32   Final    TAPSE (>1.6) 09/18/2024 1.80  cm Final    LA dimension (2D)  09/18/2024 3.6  cm Final    MV P1/2t 09/18/2024 61.00  msec Final    PI end-d aden 09/18/2024 79  cm/sec Final    Ascending aorta 09/18/2024 2.7  cm Final    Aortic arch 09/18/2024 2.9  cm Final   Lab on 08/15/2024   Component Date Value Ref Range Status    PSA 08/15/2024 0.563  0.000 - 4.000 ng/mL Final   Admission on 07/26/2024, Discharged on 07/26/2024   Component Date Value Ref Range Status    Case Report 07/26/2024    Final                    Value:Surgical Pathology Report                         Case: [id number]                                  Authorizing Provider:  Pranav Granger MD  Collected:           07/26/2024 12:54 PM          Ordering Location:     [facility] Received:            07/26/2024 01:51 PM                                 SUITES                                                                       Pathologist:           Chris Dalton MD                                                            Specimens:   1) - Gastric,  Antrum, ANTRUM BX                                                                     2) - GE Junction, GE JUNCTION BX                                                                    3) - Large Intestine, Transverse Colon, TRANSVERSE COLON POLYP                             Clinical Information 07/26/2024    Final                    Value:This result contains rich text formatting which cannot be displayed here.    Final Diagnosis 07/26/2024    Final                    Value:This result contains rich text formatting which cannot be displayed here.    Gross Description 07/26/2024    Final                    Value:This result contains rich text formatting which cannot be displayed here.    Microscopic Description 07/26/2024    Final                    Value:This result contains rich text formatting which cannot be displayed here.   Office Visit on 06/13/2024   Component Date Value Ref Range Status    Amphetamine Screen, Urine 06/13/2024 Negative  Negative Final    Barbiturates Screen, Urine 06/13/2024 Negative  Negative Final    Buprenorphine, Screen, Urine 06/13/2024 Negative  Negative Final    Benzodiazepine Screen, Urine 06/13/2024 Negative  Negative Final    Cocaine Screen, Urine 06/13/2024 Negative  Negative Final    MDMA (ECSTASY) 06/13/2024 Negative  Negative Final    Methamphetamine, Ur 06/13/2024 Negative  Negative Final    Morphine/Opiates Screen, Urine 06/13/2024 Negative  Negative Final    Methadone Screen, Urine 06/13/2024 Negative  Negative Final    Oxycodone Screen, Urine 06/13/2024 Negative  Negative Final    Phencyclidine (PCP), Urine 06/13/2024 Negative  Negative Final    THC, Screen, Urine 06/13/2024 Negative  Negative Final    Lot Number 06/13/2024 [id number]   Final    Expiration Date 06/13/2024 12/28/2025   Final       EKG Results:  No orders to display     EKG dated 1/23/24, NSR, QT-367, Qtc-437, HR 85     Imaging Results:  No Images in the past 120 days found..      Assessment & Plan    Diagnoses and all orders for this visit:    1. Generalized anxiety disorder (Primary)  -     busPIRone (BUSPAR) 15 MG tablet; Take 0.5 tablets by mouth 2 (Two) Times a Day. for anxiety  Indications: Anxiety Disorder    2. Social anxiety disorder  -     busPIRone (BUSPAR) 15 MG tablet; Take 0.5 tablets by mouth 2 (Two) Times a Day. for anxiety  Indications: Anxiety Disorder    3. Major depressive disorder, recurrent episode, moderate    4. Medication management    5. Medication care plan discussed with patient                    Visit Diagnoses:    ICD-10-CM ICD-9-CM   1. Generalized anxiety disorder  F41.1 300.02   2. Social anxiety disorder  F40.10 300.23   3. Major depressive disorder, recurrent episode, moderate  F33.1 296.32   4. Medication management  Z79.899 V58.69   5. Medication care plan discussed with patient  Z71.89 V65.49                   PLAN:  Safety: No acute safety concerns  Therapy: Currently Seeing a Therapist SHIRLEY Leslie,  seeing 1-2 times monthly  Risk Assessment: Risk of self-harm acutely is moderate  Risk factors include anxiety disorder, mood disorder,fleeting negative thoughts, and recent psychosocial stressors (pandemic). Protective factors include no family history, denies access to guns/weapons, no present SI, no history of suicide attempts or self-harm in the past, minimal AODA, healthcare seeking, future orientation, willingness to engage in care.  Risk of self-harm chronically is also moderate, but could be further elevated in the event of treatment noncompliance and/or AODA.  Meds:  -Continue Prozac (Fluoxetine) 80 mg by mouth daily in the morning to target anxiety and depression. Discussed all risks, benefits, alternatives, and side effects of Fluoxetine including but not limited to GI upset, decreased appetite, sexual dysfunction, bleeding risk, seizure risk, insomnia, anxiety, drowsiness, headache, tremor, nervousness, activation of kena or hypomania, increased  fragility fracture risk, hyponatremia, ocular effects, serotonin syndrome, hypersensitivity reaction, and activation of suicidal ideation and behavior. Patient educated on the need to practice safe sex while taking this medication. Discussed the need for patient to immediately call the office for any new or worsening symptoms, such as worsening depression; feeling nervous or restless; suicidal thoughts or actions; or other changes in mood or behavior, and all other concerns. Patient educated on medication compliance.  Patient verbalized understanding and is agreeable to taking Fluoxetine. Addressed all questions and concerns.      CHANGE Buspar FROM 15 mg daily as needed TO 7.5 mg by mouth twice daily ROUTINELY to target anxiety. Absorption is affected by food, so administration with or without food should be consistent. Risks, benefits, alternatives discussed with patient including nausea, GI upset, dizziness, mild sedation, and falls risk.  After discussion of these risks and benefits, the patient voiced understanding and agreed to proceed. Patient instructed break the 15 mg tab in half, if unable to tolerate due to drowsiness or dizziness, please call provider and will send in the 5 mg tabs. As patient is uncertain of shape and scoring of current 15 mg tablet.  UPDATED ORDER AS A NO PRINT, as patient has adequate supply.      5.  Labs:  N/a    Symptoms of anxiety, depression are under fair to good control with current medication regimen. Discussed option to try an alternative medication if current medication regimen does not improve symptoms.  However, there is a possibility the episode patient endured last week was situational with work setting, therefore, will switch the Buspar to routine at lower dose, and have patient follow up in 7 weeks and keep the previously scheduled appointment for Feb. At this time. The plan was discussed with the patient. The patient was given time to ask questions and these questions  were answered. At the conclusion of their visit they had no additional questions or concerns and all questions were answered to their satisfaction.   Patient was given instructions and counseling regarding condition and for health maintenance advice. Please see specific information pulled into the AVS if appropriate.    Patient to contact provider if symptoms worsen or fail to improve.        8/9/24:   -Therapy: Currently Seeing a Therapist SHIRLEY Leslie,  seeing monthly.  -Continue Prozac (Fluoxetine) 80 mg by mouth daily in the morning to target anxiety and depression    Patient does not meet criteria for ADHD.  Symptoms described are related to underlying social anxiety and generalized anxiety.  Patient described while taking Phentermine it helped his mood and wished to discuss the effectiveness and start a stimulant for ADHD he self presumed to have based on feelings while taking stimulant medication.  Explained to patient stimulant medications are utilized for ADHD with the exception of Phentermine for weight loss, if patient wished to have formal testing he may seek, however, based on evaluation today and past visits with patient, ADHD was not a differential diagnosis. Discussed options for non-stimulant medications such as Atomoxetine or Wellbutrin XL, however, both are not recommended with coadministration of Prozac.   The plan was discussed with the patient. The patient was given time to ask questions and these questions were answered. At the conclusion of their visit they had no additional questions or concerns and all questions were answered to their satisfaction.   Patient was given instructions and counseling regarding condition and for health maintenance advice. Please see specific information pulled into the AVS if appropriate.    Patient to contact provider if symptoms worsen or fail to improve.      7/29/24: TELEPHONE  [facility]  "patient had called to move up his appointment scheduled in Nov. Due to his PCP telling him he needed to be on ADHD medications but could not be prescribed phentermine with an ADHD stimulant medication.  Patient is scheduled for this Friday 8/2/24.  Secure chat message sent to PCP to discuss request after chart review did not indicate a prior discussion with PCP. Per discussion with PCP, patient had not been told to be placed on a medication for ADHD and that she had agreed to prescribe Phentermine for 3 months with a 3 month break between, which patient had mentioned previously at last visit.      Called patient to clarify reason for an earlier visit as ADHD evaluations are scheduled for longer appointment times. Patient reports when he last seen PCP in June, was told he may have an underlying diagnosis of ADHD based on patient feeling more sharp while taking Phentermine.  Patient was informed with any stimulant medication it does effect the brain differently and likely did feel \"sharper\".  Patient does wish to proceed with an ADHD evaluation, patient has to be at work at 330 pm which is near the office.  Next available time for ADHD evaluation is on Friday 8/9/24 at 1440, which patient was offered a note to take to employer as he may be a little late, though patient does not believe that will be needed and agrees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

## 2024-11-19 NOTE — PATIENT INSTRUCTIONS
"SPECIFIC RECOMMENDATIONS:     1.      Medications discussed at this encounter:     CHANGE Buspar to 7.5 mg by mouth TWICE DAILY ROUTINELY, break the 15 mg tab in half, if unable to tolerate due to drowsiness or dizziness, please call provider and will send in the 5 mg tabs.                   -    2.      Psychotherapy recommendations: Continue with current therapist.      3.     Return to clinic: 7 weeks Tues. 1/7/25 at 2 pm     Please arrive at least 15 minutes before your scheduled appointment time to complete check in process.      IF you are scheduled for a [facility] V[facility] visit, YOU MUST COMPLETE THE \"E-CHECK IN\" PROCESS PRIOR TO BEGINNING THE VISIT, You may still complete the E-Check in for in office visits prior to appointment, you will receive multiple text/email reminders which will direct you further if needed.            "

## 2024-11-20 ENCOUNTER — TELEPHONE (OUTPATIENT)
Dept: PSYCHIATRY | Facility: CLINIC | Age: 49
End: 2024-11-20
Payer: COMMERCIAL

## 2024-11-20 NOTE — TELEPHONE ENCOUNTER
Patient LMVM to let Provider know that he has a 15mg Buspar and can break it into 3 separate ones so he is taking the 5mg and just wanted to let Archana know and advised no need for callback.

## 2024-12-04 DIAGNOSIS — F41.1 GENERALIZED ANXIETY DISORDER: ICD-10-CM

## 2024-12-04 DIAGNOSIS — F33.0 MILD EPISODE OF RECURRENT MAJOR DEPRESSIVE DISORDER: ICD-10-CM

## 2024-12-04 RX ORDER — FLUOXETINE 40 MG/1
80 CAPSULE ORAL EVERY MORNING
Qty: 180 CAPSULE | Refills: 1 | Status: SHIPPED | OUTPATIENT
Start: 2024-12-04

## 2024-12-04 NOTE — TELEPHONE ENCOUNTER
FLUoxetine HCL 40 MG CAPSULE   Last ordered: 6 months ago (6/3/2024) by CHINO Mcclain   Follow up 01/07/2025

## 2025-01-07 ENCOUNTER — TELEPHONE (OUTPATIENT)
Dept: PSYCHIATRY | Facility: CLINIC | Age: 50
End: 2025-01-07
Payer: COMMERCIAL

## 2025-01-10 ENCOUNTER — OFFICE VISIT (OUTPATIENT)
Dept: FAMILY MEDICINE CLINIC | Age: 50
End: 2025-01-10
Payer: COMMERCIAL

## 2025-01-10 VITALS
HEIGHT: 69 IN | OXYGEN SATURATION: 97 % | DIASTOLIC BLOOD PRESSURE: 79 MMHG | HEART RATE: 88 BPM | WEIGHT: 204.6 LBS | TEMPERATURE: 98 F | BODY MASS INDEX: 30.3 KG/M2 | SYSTOLIC BLOOD PRESSURE: 121 MMHG

## 2025-01-10 DIAGNOSIS — K21.9 GASTROESOPHAGEAL REFLUX DISEASE WITHOUT ESOPHAGITIS: ICD-10-CM

## 2025-01-10 DIAGNOSIS — G47.33 OBSTRUCTIVE SLEEP APNEA SYNDROME: ICD-10-CM

## 2025-01-10 DIAGNOSIS — E66.09 CLASS 1 OBESITY DUE TO EXCESS CALORIES WITHOUT SERIOUS COMORBIDITY WITH BODY MASS INDEX (BMI) OF 31.0 TO 31.9 IN ADULT: ICD-10-CM

## 2025-01-10 DIAGNOSIS — F41.9 ANXIETY AND DEPRESSION: ICD-10-CM

## 2025-01-10 DIAGNOSIS — Z71.3 WEIGHT LOSS COUNSELING, ENCOUNTER FOR: Primary | ICD-10-CM

## 2025-01-10 DIAGNOSIS — E66.811 CLASS 1 OBESITY DUE TO EXCESS CALORIES WITHOUT SERIOUS COMORBIDITY WITH BODY MASS INDEX (BMI) OF 31.0 TO 31.9 IN ADULT: ICD-10-CM

## 2025-01-10 DIAGNOSIS — F32.A ANXIETY AND DEPRESSION: ICD-10-CM

## 2025-01-10 RX ORDER — PHENTERMINE HYDROCHLORIDE 37.5 MG/1
37.5 CAPSULE ORAL EVERY MORNING
Qty: 30 CAPSULE | Refills: 0 | Status: SHIPPED | OUTPATIENT
Start: 2025-01-10

## 2025-01-10 RX ORDER — METFORMIN HYDROCHLORIDE 500 MG/1
500 TABLET, EXTENDED RELEASE ORAL
Qty: 30 TABLET | Refills: 1 | Status: SHIPPED | OUTPATIENT
Start: 2025-01-10

## 2025-01-10 NOTE — PROGRESS NOTES
Pranav Calderon presents to [facility].    Chief Complaint: weight loss follow-up    Subjective     History of Present Illness:  HPI  Pranav is overall stable and doing well with his chronic anxiety and depression.  He sees Ashia Macario psychiatry and a therapist Elyssa Dillard.  He is on Prozac and tolerates medication well.  He feels like he is functional and stable at this time.  He typically sees his therapist twice a month and Ashia Macario once every 3 months although he does get flareups 1-2 times a month with intermittent feelings of depression and sadness.  Denies HI/SI.    He is on Viagra and tolerating med well.  He takes this for ED.  History of negative CT calcium cardiac scan.    He has sleep apnea and is on CPAP and tolerating well    Today's is follow-up for his weight loss program.  He has been doing incredible.  He is lost 4 more pounds for 13 pounds total.  He is on phentermine and tolerates well.  Mild dry mouth and no issues with sleeping at night.  Denies heart palpitations.  He continues to be on a 1600-calorie diet and walks every day.  His BMI is down to 30.21 from 31.  I recommend adding weightlifting to his current physical exercise regimen.         Result Review   The following data was reviewed by Fauzia Ballard MD on 11/05/2024.  Lab Results   Component Value Date    WBC 6.08 07/25/2023    HGB 15.8 07/25/2023    HCT 48.4 07/25/2023    MCV 95.7 07/25/2023     07/25/2023     Lab Results   Component Value Date    GLUCOSE 96 07/25/2023    BUN 15 07/25/2023    CREATININE 1.05 07/25/2023     07/25/2023    K 4.3 07/25/2023     07/25/2023    CALCIUM 9.6 07/25/2023    PROTEINTOT 7.2 07/25/2023    ALBUMIN 4.4 07/25/2023    ALT 48 (H) 07/25/2023    AST 36 07/25/2023    ALKPHOS 46 07/25/2023    BILITOT 0.4 07/25/2023    GLOB 2.8 07/25/2023    AGRATIO 1.6 07/25/2023    BCR 14.3 07/25/2023    ANIONGAP 9.0 07/25/2023    EGFR 88.7 07/25/2023  "    Lab Results   Component Value Date    CHOL 170 07/25/2023    CHLPL 172 08/05/2020    TRIG 86 07/25/2023    HDL 56 07/25/2023    LDL 98 07/25/2023     Lab Results   Component Value Date    TSH 1.870 07/25/2023     Lab Results   Component Value Date    HGBA1C 5.50 07/25/2023     Lab Results   Component Value Date    PSA 0.563 08/15/2024    PSA 0.483 07/25/2023     No results found for: \"IRON\"   Lab Results   Component Value Date    PNEV29IT 29.0 (L) 07/25/2023               Assessment and Plan:   Diagnoses and all orders for this visit:    1. Weight loss counseling, encounter for  -     metFORMIN ER (GLUCOPHAGE-XR) 500 MG 24 hr tablet; Take 1 tablet by mouth Daily With Breakfast.  Dispense: 30 tablet; Refill: 1  -     phentermine 37.5 MG capsule; Take 1 capsule by mouth Every Morning.  Dispense: 30 capsule; Refill: 0    2. Class 1 obesity due to excess calories without serious comorbidity with body mass index (BMI) of 31.0 to 31.9 in adult  -     metFORMIN ER (GLUCOPHAGE-XR) 500 MG 24 hr tablet; Take 1 tablet by mouth Daily With Breakfast.  Dispense: 30 tablet; Refill: 1  -     phentermine 37.5 MG capsule; Take 1 capsule by mouth Every Morning.  Dispense: 30 capsule; Refill: 0                Objective     Medications:  Current Outpatient Medications   Medication Instructions    Azelastine-Fluticasone 137-50 MCG/ACT suspension USE 1 SPRAY(S) IN EACH NOSTRIL TWICE DAILY    busPIRone (BUSPAR) 7.5 mg, Oral, 2 Times Daily, for anxiety    FLUoxetine (PROZAC) 80 mg, Oral, Every Morning    metFORMIN ER (GLUCOPHAGE-XR) 500 mg, Oral, Daily With Breakfast    phentermine 37.5 mg, Oral, Every Morning    sildenafil (REVATIO) 20 MG tablet 1-5 tablets po 1 hour prior to intercourse, must seek emergency evaluation if erection is maintained > 4 hours        Vital Signs:   /79 (BP Location: Left arm, Patient Position: Sitting, Cuff Size: Adult)   Pulse 88   Temp 98 °F (36.7 °C) (Oral)   Ht 175.3 cm (69\")   Wt 92.8 kg (204 " lb 9.6 oz)   SpO2 97%   BMI 30.21 kg/m²             Physical Exam:  Physical Exam  Vitals and nursing note reviewed.   Constitutional:       General: He is not in acute distress.     Appearance: Normal appearance. He is not ill-appearing, toxic-appearing or diaphoretic.   HENT:      Head: Normocephalic and atraumatic.      Right Ear: Tympanic membrane, ear canal and external ear normal.      Left Ear: Tympanic membrane, ear canal and external ear normal.      Nose: Nose normal. No congestion or rhinorrhea.      Mouth/Throat:      Pharynx: Oropharynx is clear. No oropharyngeal exudate or posterior oropharyngeal erythema.   Eyes:      Conjunctiva/sclera: Conjunctivae normal.   Cardiovascular:      Rate and Rhythm: Normal rate.      Pulses: Normal pulses.   Pulmonary:      Effort: Pulmonary effort is normal. No respiratory distress.      Breath sounds: Normal breath sounds. No stridor. No wheezing, rhonchi or rales.   Abdominal:      General: Abdomen is flat.      Palpations: Abdomen is soft.      Tenderness: There is no abdominal tenderness.   Musculoskeletal:         General: Normal range of motion.      Cervical back: Normal range of motion and neck supple. No rigidity.   Lymphadenopathy:      Cervical: No cervical adenopathy.   Skin:     General: Skin is warm and dry.      Capillary Refill: Capillary refill takes less than 2 seconds.   Neurological:      Mental Status: He is alert and oriented to person, place, and time.   Psychiatric:         Mood and Affect: Mood normal.         Behavior: Behavior normal.           Review of Systems:  Review of Systems   Constitutional:  Negative for chills, diaphoresis, fatigue and fever.   HENT:  Negative for congestion, ear discharge, sore throat and swollen glands.    Respiratory:  Negative for cough, shortness of breath and wheezing.    Cardiovascular:  Positive for chest pain. Negative for palpitations and leg swelling.   Gastrointestinal:  Negative for abdominal pain,  constipation, diarrhea, nausea, vomiting and GERD.   Genitourinary:  Negative for dysuria and flank pain.   Musculoskeletal:  Positive for neck pain. Negative for myalgias.   Skin:  Negative for rash.   Neurological:  Negative for dizziness, weakness, numbness and headache.   Psychiatric/Behavioral:  Positive for depressed mood. Negative for sleep disturbance and suicidal ideas. The patient is nervous/anxious.               Follow Up   Return in about 1 month (around 2/10/2025) for Recheck.    Part of this note may be an electronic transcription/translation of spoken language to printed   text using the Dragon Dictation System.            Medical History:  Medications Discontinued During This Encounter   Medication Reason    phentermine 37.5 MG capsule Reorder      Past Medical History:    Allergic    Started out hay fever, now just build up in nack of throat    Anxiety    Back pain    CHF (congestive heart failure)    Not bad at moment, likely result of sleep apnea    Depression    Erectile dysfunction    Head injury    Lumbar herniated disc    Lumbosacral disc disease    Obesity    Obstructive sleep apnea    C-PAP mask not working/ not currently treating    Panic disorder    PTSD (post-traumatic stress disorder)    Scoliosis    Spinal headache    Had issues with neck pain    Work related injury    reinjured 8-13-15     Past Surgical History:    COLONOSCOPY    Procedure: COLONOSCOPY WITH POLYPECTOMY;  Surgeon: rPanav Granger MD;  Location: [facility] ENDOSCOPY;  Service: General;  Laterality: N/A;  DIVERTICULOSIS. COLON POLYP    ENDOSCOPY    Procedure: ESOPHAGOGASTRODUODENOSCOPY with biopsies;  Surgeon: Pranav Granger MD;  Location: [facility] ENDOSCOPY;  Service: General;  Laterality: N/A;  NORMAL    LUMBAR DECOMPRESSION    JEH  L4-5 lumbar decomp disckectomy and TLIF  sextant mtrix and stealth    LUMBAR FUSION    Procedure: RT L4-5 LUMBAR DECOMPRESSION DISCECTOMY AND TLIF SEXTANT METRIX AND STEALTH;   Surgeon: Celestino Mackay MD;  Location: [facility] MAIN OR;  Service:     PLANTAR FASCIA RELEASE    Procedure: LEFT FOOT ENDOSCOPIC PLANTAR FASCIOTOMY;  Surgeon: Rafy Bauer DPM;  Location: [facility] MAIN OR;  Service: Podiatry;  Laterality: Left;    SPINE SURGERY    lumbar spinal diskectomy L4/L5  L5/S1 2009    WISDOM TOOTH EXTRACTION      Family History   Problem Relation Age of Onset    Diabetes Father     Anxiety disorder Son         Kvng    ADD / ADHD Son     Depression Son     Mental illness Son      Social History     Tobacco Use    Smoking status: Never     Passive exposure: Never    Smokeless tobacco: Never   Substance Use Topics    Alcohol use: No       Health Maintenance Due   Topic Date Due    Pneumococcal Vaccine 0-64 (1 of 2 - PCV) Never done    COVID-19 Vaccine (4 - 2024-25 season) 09/01/2024        Immunization History   Administered Date(s) Administered    COVID-19 (PFIZER) 12YRS+ (COMIRNATY) 01/23/2024    COVID-19 (PFIZER) Purple Cap Monovalent 04/02/2021, 04/28/2021    Flu Vaccine Intradermal Quad 18-64YR 10/19/2021    Influenza Injectable Mdck Pf Quad 10/19/2021, 10/25/2022, 09/28/2023    Influenza, Unspecified 10/25/2022    Tdap 07/25/2023       No Known Allergies   Answers submitted by the patient for this visit:  Primary Reason for Visit (Submitted on 1/3/2025)  What is the primary reason for your visit?: Problem Not Listed

## 2025-01-24 ENCOUNTER — HOSPITAL ENCOUNTER (OUTPATIENT)
Dept: CT IMAGING | Facility: HOSPITAL | Age: 50
Discharge: HOME | End: 2025-01-24
Admitting: FAMILY MEDICINE

## 2025-01-24 PROCEDURE — 75571 CT HRT W/O DYE W/CA TEST: CPT

## 2025-01-30 ENCOUNTER — OFFICE VISIT (OUTPATIENT)
Dept: FAMILY MEDICINE CLINIC | Age: 50
End: 2025-01-30
Payer: COMMERCIAL

## 2025-01-30 ENCOUNTER — TELEPHONE (OUTPATIENT)
Dept: FAMILY MEDICINE CLINIC | Age: 50
End: 2025-01-30
Payer: COMMERCIAL

## 2025-01-30 VITALS
WEIGHT: 207 LBS | HEART RATE: 100 BPM | BODY MASS INDEX: 30.66 KG/M2 | SYSTOLIC BLOOD PRESSURE: 114 MMHG | TEMPERATURE: 97.9 F | HEIGHT: 69 IN | DIASTOLIC BLOOD PRESSURE: 78 MMHG | OXYGEN SATURATION: 98 %

## 2025-01-30 DIAGNOSIS — B02.9 HERPES ZOSTER WITHOUT COMPLICATION: Primary | ICD-10-CM

## 2025-01-30 PROCEDURE — 99213 OFFICE O/P EST LOW 20 MIN: CPT | Performed by: NURSE PRACTITIONER

## 2025-01-30 NOTE — TELEPHONE ENCOUNTER
Pt dropped off [facility] paperwork to be completed. The $20 fee was collected. The paperwork was placed in Tolar's mailbox and attempted to be attached to this encounter.

## 2025-01-30 NOTE — PROGRESS NOTES
"Chief Complaint  Pranav Calderon presents to [facility] for Herpes Zoster (Dx 1/24/25. Pt states he was told to follow back up if not getting better. )    Subjective     History of Present Illness  Pranav is in today to follow-up on a recent urgent care visit from 1/24/2025.  He was diagnosed with shingles.  He is in today needing a note to remain off work.  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  He is concerned that his mom developed shingles, causing encephalitis and ultimately caused her passing.      Assessment and Plan     Diagnoses and all orders for this visit:    1. Herpes zoster without complication (Primary)  Comments:  advised on lidocaine patch, he will follow up if pain persist worsens.  Continuous FMLA from 1/18/2025 - 2/09/2025  RTW anticipated 2/10/2025 but may extend            Follow Up   Return if symptoms worsen or fail to improve.  Future Appointments   Date Time Provider Department Center   2/11/2025 11:00 AM Archana Macario APRN [facility]   2/18/2025  2:30 PM Fauzia Ballard MD [facility]       No orders of the defined types were placed in this encounter.      There are no discontinued medications.       Review of Systems    Objective     Vitals:    01/30/25 1544   BP: 114/78   BP Location: Left arm   Patient Position: Sitting   Cuff Size: Large Adult   Pulse: 100   Temp: 97.9 °F (36.6 °C)   TempSrc: Oral   SpO2: 98%   Weight: 93.9 kg (207 lb)   Height: 175.3 cm (69\")            Physical Exam  Vitals reviewed.   Constitutional:       Appearance: Normal appearance.   HENT:      Head: Normocephalic.   Eyes:      Pupils: Pupils are equal, round, and reactive to light.   Cardiovascular:      Rate " and Rhythm: Normal rate and regular rhythm.      Heart sounds: No murmur heard.  Pulmonary:      Effort: Pulmonary effort is normal.      Breath sounds: Normal breath sounds.   Musculoskeletal:         General: Normal range of motion.   Skin:     Findings: Rash present. Rash is vesicular.             Comments: Herpes zoster   Neurological:      Mental Status: He is alert.   Psychiatric:         Mood and Affect: Mood normal.         Behavior: Behavior normal.               Result Review        No Known Allergies   Past Medical History:   Diagnosis Date    Allergic My whole life    Started out hay fever, now just build up in nack of throat    Anxiety     Back pain     CHF (congestive heart failure) 2022    Not bad at moment, likely result of sleep apnea    Depression     Erectile dysfunction 1999    Head injury     Lumbar herniated disc     Lumbosacral disc disease     Obesity     Obstructive sleep apnea     C-PAP mask not working/ not currently treating    Panic disorder     PTSD (post-traumatic stress disorder)     Scoliosis My whole life    Spinal headache 2001    Had issues with neck pain    Work related injury 10/30/2014    reinjured 8-13-15     Current Outpatient Medications   Medication Sig Dispense Refill    Azelastine-Fluticasone 137-50 MCG/ACT suspension USE 1 SPRAY(S) IN EACH NOSTRIL TWICE DAILY      busPIRone (BUSPAR) 15 MG tablet Take 0.5 tablets by mouth 2 (Two) Times a Day. for anxiety  Indications: Anxiety Disorder      FLUoxetine (PROzac) 40 MG capsule TAKE 2 CAPSULES BY MOUTH EVERY MORNING 180 capsule 1    lidocaine (XYLOCAINE) 2 % jelly Apply  topically to the appropriate area as directed 3 (Three) Times a Day As Needed for Mild Pain. 30 g 0    metFORMIN ER (GLUCOPHAGE-XR) 500 MG 24 hr tablet Take 1 tablet by mouth Daily With Breakfast. 30 tablet 1    phentermine 37.5 MG capsule Take 1 capsule by mouth Every Morning. 30 capsule 0    sildenafil (REVATIO) 20 MG tablet 1-5 tablets po 1 hour prior to  intercourse, must seek emergency evaluation if erection is maintained > 4 hours 60 tablet 1    valACYclovir (VALTREX) 1000 MG tablet Take 1 tablet by mouth 3 (Three) Times a Day for 7 days. 21 tablet 0     No current facility-administered medications for this visit.     Past Surgical History:   Procedure Laterality Date    COLONOSCOPY N/A 7/26/2024    Procedure: COLONOSCOPY WITH POLYPECTOMY;  Surgeon: Pranav Granger MD;  Location: [facility] ENDOSCOPY;  Service: General;  Laterality: N/A;  DIVERTICULOSIS. COLON POLYP    ENDOSCOPY N/A 7/26/2024    Procedure: ESOPHAGOGASTRODUODENOSCOPY with biopsies;  Surgeon: Parnav Granger MD;  Location: [facility] ENDOSCOPY;  Service: General;  Laterality: N/A;  NORMAL    LUMBAR DECOMPRESSION  03/09/2016    JEH  L4-5 lumbar decomp disckectomy and TLIF  sextant mtrix and stealth    LUMBAR FUSION Right 03/09/2016    Procedure: RT L4-5 LUMBAR DECOMPRESSION DISCECTOMY AND TLIF SEXTANT METRIX AND STEALTH;  Surgeon: Celestino Mackay MD;  Location[facility] MA[facility] OR;  Service:     PLANTAR FASCIA RELEASE Left 02/09/2021    Procedure: LEFT FOOT ENDOSCOPIC PLANTAR FASCIOTOMY;  Surgeon: Rafy Bauer DPM;  Location: [facility];  Service: Podiatry;  Laterality: Left;    SPINE SURGERY      lumbar spinal diskectomy L4/L5  L5/S1 2009    WISDOM TOOTH EXTRACTION        Health Maintenance Due   Topic Date Due    Pneumococcal Vaccine 0-64 (1 of 2 - PCV) Never done    COVID-19 Vaccine (4 - 2024-25 season) 09/01/2024      Immunization History   Administered Date(s) Administered    COVID-19 (PFIZER) 12YRS+ (COMIRNATY) 01/23/2024    COVID-19 (PFIZER) Purple Cap Monovalent 04/02/2021, 04/28/2021    Flu Vaccine Intradermal Quad 18-64YR 10/19/2021    Influenza Injectable Mdck Pf Quad 10/19/2021, 10/25/2022, 09/28/2023    Influenza, Unspecified 10/25/2022    Tdap 07/25/2023         Part of this note may be an electronic transcription/translation of spoken language to printed   text using the  Dragon Dictation System.      Wanda Bautista, APRN

## 2025-02-06 ENCOUNTER — TELEPHONE (OUTPATIENT)
Dept: FAMILY MEDICINE CLINIC | Age: 50
End: 2025-02-06
Payer: COMMERCIAL

## 2025-02-06 NOTE — TELEPHONE ENCOUNTER
Caller: Pranav Calderon    Relationship: Self    Best call back number: [phone]     What form or medical record are you requesting: RETURN TO WORK DOCUMENT FOR 2/10/25    Who is requesting this form or medical record from you: [employer] (TWO COPIES - ONE FOR EACH BUSINESS)    How would you like to receive the form or medical records (pick-up, mail, fax):      Timeframe paperwork needed: ASAP

## 2025-02-06 NOTE — TELEPHONE ENCOUNTER
----- Message from Tatiana DAVENPORT sent at 1/30/2025  4:55 PM EST -----    ----- Message -----  From: Wanda Bautista APRN  Sent: 1/30/2025   4:04 PM EST  To: [facility] Tushar Bard Clinical [facility]    Can you call him on Thursday 2/6/2025 and see if he is ready to return to work and if not, we will extend his FMLA

## 2025-02-06 NOTE — TELEPHONE ENCOUNTER
"Pt states \"the sores are getting better but I am still sore in the front.\" Pt thinks it would be best to extend his time off.  "

## 2025-02-06 NOTE — TELEPHONE ENCOUNTER
----- Message from Tatiana DAVENPORT sent at 1/30/2025  4:55 PM EST -----    ----- Message -----  From: Wanda Bautista APRN  Sent: 1/30/2025   4:04 PM EST  To: [facility] Tushar Bard Clinical Pod B    Can you call him on Thursday 2/6/2025 and see if he is ready to return to work and if not, we will extend his FMLA

## 2025-02-07 ENCOUNTER — TELEPHONE (OUTPATIENT)
Dept: FAMILY MEDICINE CLINIC | Age: 50
End: 2025-02-07
Payer: COMMERCIAL

## 2025-02-07 NOTE — TELEPHONE ENCOUNTER
Caller: Pranav Calderon    Relationship to patient: Self    Best call back number: [phone]    Patient is needing: PATIENT IS ALSO NEEDING A RETURN TO WORK NOTE FOR 02.10.2025 UPLOADED TO [employer]

## 2025-02-07 NOTE — TELEPHONE ENCOUNTER
Caller: Pranav Calderon    Relationship: Self    Best call back number: [phone]    What form or medical record are you requesting: ATTENDING PHYSICIAN STATEMENT FOR DISABILITY     Who is requesting this form or medical record from you:      How would you like to receive the form or medical records (pick-up, mail, fax): FAX  If fax, what is the fax number: [phone]    Timeframe paperwork needed: AS SOON AS POSSIBLE

## 2025-02-11 ENCOUNTER — OFFICE VISIT (OUTPATIENT)
Dept: BEHAVIORAL HEALTH | Facility: CLINIC | Age: 50
End: 2025-02-11
Payer: COMMERCIAL

## 2025-02-11 VITALS
HEART RATE: 94 BPM | SYSTOLIC BLOOD PRESSURE: 108 MMHG | WEIGHT: 204.2 LBS | DIASTOLIC BLOOD PRESSURE: 80 MMHG | HEIGHT: 69 IN | BODY MASS INDEX: 30.24 KG/M2

## 2025-02-11 DIAGNOSIS — F41.1 GENERALIZED ANXIETY DISORDER: Primary | ICD-10-CM

## 2025-02-11 DIAGNOSIS — Z79.899 MEDICATION MANAGEMENT: ICD-10-CM

## 2025-02-11 DIAGNOSIS — F33.0 MILD EPISODE OF RECURRENT MAJOR DEPRESSIVE DISORDER: ICD-10-CM

## 2025-02-11 DIAGNOSIS — Z71.89 MEDICATION CARE PLAN DISCUSSED WITH PATIENT: ICD-10-CM

## 2025-02-11 NOTE — PATIENT INSTRUCTIONS
"Should you want to get in touch with your provider, CHINO Mcclain, please contact MY Medical Assistant, Meri, directly at [phone].  Recommend saving Meri's direct number in phone as this is the PREFERRED & EASIEST way to get in contact with your provider.  Please leave a voice mail if you do not get an answer and she will return your call within 24 hrs. You will NOT be able to contact provider on Revl, as Behavioral Health Providers are restricted. YOU MUST CALL [phone]  If you need to speak with the on call provider after hours or on weekends, please Contact the [facility] ([phone]) and staff will be able to page the provider on call directly.     SPECIFIC RECOMMENDATIONS:     1.      Medications discussed at this encounter:                   -  START taking Buspar 7.5 mg (one half tab of the 15 mg) at lunch 11am and dinner 5pm    2.      Psychotherapy recommendations: Continue with current therapist.      3.     Return to clinic: 4 months  Friday 6/13/25 at 1040 am    Please arrive at least 15 minutes before your scheduled appointment time to complete check in process.      IF you are scheduled for a Rev[employer] VIDEO visit, YOU MUST COMPLETE THE \"E-CHECK IN\" PROCESS PRIOR TO BEGINNING THE VISIT, You may still complete the E-Check in for in office visits prior to appointment, you will receive multiple text/email reminders which will direct you further if needed.            "

## 2025-02-11 NOTE — PROGRESS NOTES
"  Subjective   Pranav Calderon is a 49 y.o. male who presents today for follow up    Referring Provider:  No referring provider defined for this encounter.    Chief Complaint:  Anxiety, depression, medication management, medication care plan discussed     Answers submitted by the patient for this visit:  Depression (Submitted on 2/4/2025)  Chief Complaint: Depression  Visit: follow-up  Frequency: most days  Severity: severe  excessive worry: Yes  insomnia: No  irritability: Yes  malaise/fatigue: Yes  obsessions: Yes  hypersomnia: No  difficulty controlling mood: Yes  Medication compliance: %  Side effects: sexual problems  Aggravated by: family issues, work stress      History of Present Illness:    2/11/25:  Patient presents today in office at last visit Buspar was changed from 15 mg daily as needed (previously ordered per PCP) to 7.5 mg twice daily routinely, for which patient reports only taking at 5 pm when he gets home from work with dinner, due to morning dose not feeling effective though has considered due to taking Prozac and Phentermine in the morning.  Patient has switched work hours to 8am-4pm. Patient takes lunch break at 11 am. Has not noticed feeling more drowsy after Buspar dose, though feels he does get to sleep faster, though hard to say if it is related to season.     Recalls in Nov. The day before his therapy appointment had a difficult time at work, though was able to cope with it better due to seeing therapist the following day. Reports having anger, stress, brought back memories and had some chest pain from the anxiety, similar to initial symptoms.  \"Compared to when it started I feel I know how to tame it, the Prozac is giving me the opportunity to fight it.\"    Patient reports being sick with a stomach bug twice, and then was diagnosed with shingles  a few days after a stressful episode at work. Though since that time has not felt very anxious nor depressed.    Patient had went to 2nd " "shift previously due to struggles with anxiety, though feels returning back to first shift will allow him to get on a good routine with new medication and will help family, has a positive outlook.     Depression: Patient complains of depression. He complains of anhedonia, depressed mood, difficulty concentrating, fatigue, and feelings of worthlessness/guilt  Anxiety:  The patient endorses to the following symptoms of anxiety including: excessive anxiety and worry about a number of events or activities for more days than not, being easily fatigued, difficulty concentrating or mind going blank, and irritability which have caused impairment in important areas of daily functioning.       11/19/24:    Answers submitted by the patient for this visit:  Primary Reason for Visit (Submitted on 11/16/2024)  What is the primary reason for your visit?: Depression  Depression (Submitted on 11/16/2024)  Chief Complaint: Depression  Visit: follow-up  Frequency: constantly  Severity: severe  excessive worry: Yes  insomnia: Yes  irritability: Yes  malaise/fatigue: Yes  obsessions: Yes  hypersomnia: Yes  difficulty controlling mood: Yes  difficulty staying asleep: No  difficulty falling asleep: No  Hours of sleep per night: 6 Hours  Medication compliance: %  Aggravated by: family issues, social activities, work stress  Additional information: Having panic attacks too.    Patient presents today in office for an earlier appointment due to worsened anxiety and depression, \"last week was the worst week I had\", patient had to take off work 3 days and left early one day, reports going home and cried without known reason, patient is adherent with Prozac. Over the weekend was able to \"reset\" and returned to work yesterday. Patient recalls having difficulty concentrating, felt some dizziness, and overall felt down. Then began to think of requiring psychotropic medications to maintain stable mood.  Felt anxious about anyone approaching " "him to talk.  Patient denies a specific trigger of past events that could have been reason for depressive symptoms last week. \"Its not one thing, its many things.\"  Recalls discussion with therapist, several issues occur at work and has been catastrophizing. Some days he feels the Prozac works and other days not.  Continues to see therapist 1-2 times per month.     In regards to PHQ9 screening question of thoughts of being better off dead or hurting himself in some way, patient reports thoughts of being better off dead last week, denies thoughts of killing himself, \"if I was not here, everyone would be better off and not have to worry so much, I never thought of doing it intentionally.\" Patient is adamant of denial of suicidal thoughts, plan, rumination, or planning and is convincing.    Since last visit noted PCP added Buspar 15 mg daily as needed, which patient reports taking only as needed, and took one night last week, then the following few days felt he was more isolated, felt more tired.      Depression: Patient complains of depression. He complains of anhedonia, depressed mood, difficulty concentrating, fatigue, feelings of worthlessness/guilt, and insomnia   Anxiety:  The patient endorses to the following symptoms of anxiety including: excessive anxiety and worry about a number of events or activities for more days than not, being easily fatigued, difficulty concentrating or mind going blank, irritability, muscle tension, and sleep disturbance which have caused impairment in important areas of daily functioning.    S-Uxrs-Mfdwcinu/Recent-Self-Report    Question 11/19/2024  8:39 AM EST - Filed by Meri Perales MA   In the past month, have you wished you were dead or wished you could go to sleep and not wake up? Yes   In the past month, have you actually had any thoughts about killing yourself? No   In your lifetime, have you ever done anything, started to do anything, or prepared to do anything to end your " "life? No       24:    Answers submitted by the patient for this visit:  Primary Reason for Visit (Submitted on 2024)  What is the primary reason for your visit?: Other  Other (Submitted on 2024)  Please describe your symptoms.: Anxiety, depression,  ptsd sometimes causing chest pain, possible adhd.  Have you had these symptoms before?: Yes  How long have you been having these symptoms?: Greater than 2 weeks  Please list any medications you are currently taking for this condition.: Fluoxetine 80mg, An. Flonase    Patient presents today in office for a requested ADHD evaluation.  Patient noticed there were things he rarely noticed previously had subsided while taking Phentermine for weight loss.  \"If anyone reminds me for appointments or wife will remind me and say again \"I can't wait for tomorrow night\" and forgets we had plans.\"  Patient last appointment for therapy was on calendar on phone though phone  and had been using another phone which the calendar information did not transfer and patient had missed the appointment in .  Patient reports upon arriving to work, he would head directly to assigned area, however, while taking Phentermine he was able to be more social, felt less anxious, carry on conversations.  \"I didn't have to think for words to describe things, now I have to reach out to find words I need to use. Things I really didn't notice that badly until I was on it and I felt more myself.\"  Patient recalls while in highschool would click teeth together or tapped pencil to his head, fidgeting in seat when he felt anxious.   \"Didn't over think about bills, teenager stuff and work problems.\"     \"It's more about how I felt on phentermine than to have evaluation for ADHD, and I didn't notice I had ADHD until I took it.  I didn't take any naps, I wasn't bothered by things.\"    ADHD:   Elementary school:   Grades: unable to recall    Special classes or failures: no and no  Got in trouble: " "sometimes couldn't sit still, had hands smacked  Referral for ADHD testing: no  Fhx: son-diagnosed around 7-8th grade, treated with medication-Lamictal and quila chew   Presently:  Problems with attention to detail: No (denies submitting incomplete work, goes back to fix mistakes) describes as: \"while placing barn doors up in home, had to keep going back and getting aligned, while taking phentermine I didn't notice it or I didn't care if I did or didn't go over the yard again because I would have to go back and cut places I missed .\"  Problems with sustained attention: No (Difficulty remaining focused during lengthy meetings over 15-20 minutes.\"I want to go to work\".as sometimes will have meetings and patient wants to get started on his day.   Problems listening when spoken to directly: No (describes situations where he has difficulty listening while preoccupied in another task, if there are no obvious distraction is able to listen.) \"will hear them talking, I can't resonate what they are saying, have to ask them to repeat it. Especially if I am doing something already. 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 Putting it back together, \"at work is not as bad because I am getting paid. \"  Avoids/Dislikes/Reluctant to engage in tasks that require sustained mental effort:No thinking about putting stuff away in room, hasn't put larger fitting clothes away since weight loss, able to clean garage due to taking phentermine.  Easily distracted: Yes (noises, TV, phone, external factors)   Forgetting things: No (paying bills-does all on mobile roxanna so its not an issue, keeping " "appointments-with reminders has no difficulty)   Losing things: Sometimes-has a designated place for keys, sometimes doesn't place in area, mostly keys are misplaced often, wife had found keys in the garage and patient did not recall leaving them in the garage, other items denied.  Hard to organize: Sometimes (admits to difficulty keeping things in order, hasn't been able to adhere to calorie counting roxanna \"I can't keep with it for some reason.\" Denies difficulty managing sequential tasks, poor time management, nor failure to meet deadlines)   Talks a lot and cutting people off: Sometimes, not consistently \"when I get to talking but a lot of times anti-social behavior kicks in;\" and  sometimes  Drifts off during conversations: Sometimes   Difficulty with Reading: \"I'm not into reading\"  Xiii. Difficulty watching TV/Movies: No- recently doesn't finish You tube videos; can sit through movies      6/3/24:    Answers submitted by the patient for this visit:  Primary Reason for Visit (Submitted on 5/27/2024)  What is the primary reason for your visit?: Depression  Depression (Submitted on 5/27/2024)  Chief Complaint: Depression  Visit: follow-up  Frequency: constantly  Severity: severe  excessive worry: Yes  insomnia: No  irritability: Yes  malaise/fatigue: No  obsessions: No  hypersomnia: No  difficulty controlling mood: Yes  Medication compliance: %  Side effects: sexual problems  Aggravated by: family issues, social activities, work stress    Patient presents today in office reports he is somewhat anxious about PCP stopping phentermine for 3 months and will then restart, patient was 241 lb. On 1/23/24, and today is 225 lbs, though patient weighed self at home and was 219 lb this morning without clothes or phone, keys, as he did today.  Patient has been practicing mindfulness regarding food, most recently has stopped eating pasta and pizza, and is now avoiding soda's, drinks more water.  Patient feels his self " "confidence has improved and see's results. \"The Prozac has been doing great.\"  Patient didn't have 1 time to take last week when they went to [place], and started feeling panic about not being able to find a place to park, loud music worsened anxiety.   Re-weighed patient without cell phone, keys, and shoes: 223.0 lbs. Overall mood has been stable with Prozac.    Depression: Patient complains of depression. He complains of anhedonia, depressed mood, difficulty concentrating, fatigue, and feelings of worthlessness/guilt  Anxiety:  The patient endorses significant symptoms of anxiety including:  mild anxiousness, being easily fatigued, and difficulty concentrating or mind going blank which have caused impairment in important areas of daily functioning.      1/30/24:    Answers submitted by the patient for this visit:  Primary Reason for Visit (Submitted on 1/23/2024)  What is the primary reason for your visit?: Other  Other (Submitted on 1/23/2024)  Please describe your symptoms.: Depression, anxiety, PTSD  Have you had these symptoms before?: Yes  How long have you been having these symptoms?: Greater than 2 weeks  Please list any medications you are currently taking for this condition.: Fluoxetine 80mg  Please describe any probable cause for these symptoms. : Work, my past    Patient presents today in office, at last visit Prozac was increased from 60 to 80 mg for which patient reports has been effective, patient found another bottle of the 20 mg, and has been taking 4 of the 20 mg.  Reports less anxiety and depressive symptoms.  Denies side effects since dose increase, sleeping well.     Noted patient started Phentermine per PCP which was dispensed 1/23/24 for weight loss. Patient has decreased soda intake, patient noticing weight loss which has helped with mood improvement.  Patient was also started on Buspar 15 mg twice daily per PCP due to having 2 panic attacks over the last month, which patient has not " "taken.     Patient was in bed laying flat about to go to sleep and began to have SOA, feeling panic, started shaking.  Patient got out of bed, drank some water and ate a snack and sat on couch to calm down, and was able to recover, \"it lasted no more than 5-10 minutes.\"    Continues to see therapist monthly, which patient finds therapeutic and does not desire biweekly at this time.     12/7/23:    Answers submitted by the patient for this visit:  Primary Reason for Visit (Submitted on 12/5/2023)  What is the primary reason for your visit?: Other  Other (Submitted on 12/5/2023)  Please describe your symptoms.: Medicine check  Have you had these symptoms before?: Yes  How long have you been having these symptoms?: Greater than 2 weeks  Please list any medications you are currently taking for this condition.: Fluoxetine 20 MG (x3)  Please describe any probable cause for these symptoms. : Stress anxiety depression PTSD    Patient presents today in office, at last visit patient was reminded to take total dose of Prozac 60 mg at one time in the morning at 11 am as patient has been taking 40 mg at 11 am and 20 mg in the evening 530-6 pm. Which patient admits to adherence. Patient reports Prozac has been helpful in managing PTSD and anxiety, though admits to low energy.  Patient had felt fatigue prior to dose time changes.     Patient has been working more hours, though even while off and rested and while in [place] in 8/2023, felt didn't want to go out to [place] and wanted to stay indoors, though uncertain if \"its just me. Seems I wasn't excited to go anywhere, I was on vacation.\"      Patient no longer having chest pain, with anxiety, able to talk to people easier now.     Patient was able to see therapist since last visit.  Patient has another visit next week, attending monthly.      Patient had work issue in 2/2022 and when patient did have interactions with those individuals would experience physical symptoms of anxiety. " Before the Prozac would avoid interactions with that individual, and now able to not be bothered.   In the past while taking Prozac years ago does not recall experiencing anhedonia and fatigue, though symptoms have improved, they could be better.     Depression: Patient complains of depression. He complains of anhedonia, depressed mood, difficulty concentrating, fatigue, and feelings of worthlessness/guilt  Anxiety:  The patient endorses significant symptoms of anxiety including:  anxiousness, being easily fatigued, difficulty concentrating or mind going blank, and irritability which have caused impairment in important areas of daily functioning.      8/28/23:    Answers submitted by the patient for this visit:  Primary Reason for Visit (Submitted on 8/21/2023)  What is the primary reason for your visit?: Other  Other (Submitted on 8/21/2023)  Please describe your symptoms.: Anxiety (causing chest pain) PTSD Depression  Have you had these symptoms before?: Yes  How long have you been having these symptoms?: Greater than 2 weeks  Please list any medications you are currently taking for this condition.: Fluoxetine 60mg  Please describe any probable cause for these symptoms. : Work, stress, sleep apnea    Patient presents today in office, at last visit Prozac was increased from 40 to 60 mg and was advised to take later in the day due to patient working 2nd shift and had reported feeling the medication was corbin off and not lasting 12 hrs. Reports improvement in mood, has been taking 40 mg of old supply at 11 am and takes 20 mg while on break at work between 530-6 pm.  Denies side effects, tolerating well.    Patient had some increased stress regarding finances as truck needed repairs. Work is better.  Feels he is able to handle stress at home better than the past.    Patient has scheduled an appointment with therapist 9/13/23.       6/20/23:    Answers submitted by the patient for this visit:  Primary Reason for Visit  "(Submitted on 6/13/2023)  What is the primary reason for your visit?: Other  Other (Submitted on 6/13/2023)  Please describe your symptoms.: Anxiety  depression  PTSD  Have you had these symptoms before?: Yes  How long have you been having these symptoms?: Greater than 2 weeks  Please list any medications you are currently taking for this condition.: Fluoxetine 40  Please describe any probable cause for these symptoms. : Work, childhood, stress    Patient presents today in office reports having days Prozac is not working due to symptoms of lack of focus, chest discomfort due to anxiety. Though symptoms are not as frequent as with 20 mg Prozac.  Has days of being more \"anti-social\" and good days, and will compensate low energy levels with caffeine intake.  \"My brain makes it feel like a bad day, nothing out of the ordinary making it a bad day.\"  Patient works 4p-12 midnight, and takes dose upon awakening at 9-10 am, and around 10-11 pm feels anxiety starts. Since starting Prozac feels needs a nap in the afternoon, though uncertain as patient started 2nd shift 10/2022 as well as Prozac vs KAILASH which is untreated.     Patient was advised to schedule appointment with therapist at last visit, however, has not heard back since leaving a message.     Depression: Patient complains of depression. He complains of anhedonia, depressed mood, difficulty concentrating, fatigue, feelings of worthlessness/guilt, and insomnia  Anxiety:  The patient endorses significant symptoms of anxiety including: excessive anxiety and worry about a number of events or activities for more days than not, restlessness or feeling keyed up, being easily fatigued, difficulty concentrating or mind going blank, irritability, and sleep disturbance which have caused impairment in important areas of daily functioning.      3/21/23:    Answers for HPI/ROS submitted by the patient on 3/14/2023  What is the primary reason for your visit?: Other  Please describe " "your symptoms.: Depression, anxiety,  ptsd  Have you had these symptoms before?: Yes  How long have you been having these symptoms?: Greater than 2 weeks  Please list any medications you are currently taking for this condition.: Fluoxetine 40mg  Patient presents today in office, at last visit Prozac was increased from 20 to 40 mg for which patient reports \"I am doing well. I don't think my anxiety will go away completely, but it is definitely better, I don't dwell on it.\" Patient reports overall Prozac has been very effective and is pleased with results.  Sleep has improved due to decreased anxiety, able to calm mind.      Patient had an appointment in early 1/2023 with therapy which was cancelled by provider and has not received a call to reschedule, which patient has not attempted to reschedule, though plans to do so today.      Patient has felt anxiety prior to going into work, though quickly subsides, and patient feels may be due to the idea of just going into work.  Overall mood has improved in regards to depression.     1/31/23:  Patient presents today in office \"I feel like the problems are still there, but I am dealing with them better, I am not stressing about it as much at home.  Started Prozac at last visit, for which patient feels has helped improve mood.  Patient explains in Nov 2022 had some financial concerns, and was able to refinance both vehicles which lowered monthly payments, and \"I felt like if I didn't have the Prozac I wouldn't have been able to do that.\"      Patient denies SI, though at times has thoughts \"if I was gone they would be happier financially, because of monthly child support.\"  Son is 16 currently. Denies feelings of harming self or others, denies rumination of thoughts or action plan.      Patient takes Prozac later in morning at 11 am-12 noon due to working 2nd shift, though at times will not go to sleep until 3 am.  Patient had difficulty with sleep prior to starting Prozac as " "patient has KAILASH and is unable to wear the mask.  Recently bought a mouth guard to help with snoring.  Denies gasping for air, though wife says patient has in the past. \"I do snore really loud.\"    Patient continues to see therapist, which is going well.     Depression: Patient complains of depression. He complains of anhedonia, depressed mood, fatigue, feelings of worthlessness/guilt and insomnia    Anxiety:  The patient endorses significant symptoms of anxiety including: excessive anxiety and worry about a number of events or activities for more days than not, being easily fatigued, difficulty concentrating or mind going blank, irritability and sleep disturbance which have caused impairment in important areas of daily functioning.        10/18/22:  Patient presents today in office reporting tolerating Lexapro initially, though having difficulty falling asleep, and approximately 1 week ago stopped taking Lexapro and noticed immediately those symptoms resolved.  As now patient is not having difficulty going to sleep.  Patient tried to take lower dose of Hydroxyzine 10 mg and continued to feel groggy the following day.     Patient reports started therapy with Elyssa and has upcoming appointment 10/25/22.     Patient reports switched shifts from 8a-4pm to 4 pm-12 midnight, which started 2 weeks ago, continues to have chest pain with anxiety while driving, sleep has improved, however, depression rates 6 our of 10, and anxiety 8or9 /10.  \"This hasn't been the best year.\"  Difficulty's with employer, finances, son, life stressors.     Patient further explains he was having difficulty sleeping while taking Lexapro at lower dose, though open to possible reason related to anxiety.   Recalls while taking Prozac also took Hydroxyzine at night which suspect feelings of grogginess, \"feeling so crappy was a combination of the two together.\"     9/13/22:  Patient presents today in office reporting  \"Effexor not working for me. " "Sweating a lot and feel zoned out\" and felt bloated, patient was started on 37.5 mg of Effexor at last appointment and decreased as needed Hydroxyzine from 25 to 10 mg by mouth every 6 hr as needed.  Patient reports stopped taking Effexor XR after almost 2 weeks due to adverse side effects.  Once patient stopped Effexor XR the symptoms resolved.     Patient tried lower dose of Hydroxyzine of 10 mg after stopping Effexor XR for 2 days and continued to feel tired, though not able to get to sleep, patient took off from work to help \"destress and difficulty sleeping.\" Patient has been off for approximately 2 weeks including scheduled off days.      Patient was able to schedule appointment with therapist for 9/28/22 at 2 pm.      Patient wife has been driving , although, patient did  son from school, and able to keep mind off of anxiety while talking to son.  School is 5-10 minutes away from patient home.     Patient does suffer from neck pain which worsens with CPAP/BiPap mask despite trying to loosen strap.  Not able to tolerate.    Patient most impairing symptom is the depression and anxiety as patient believes \"insomnia is a product of the anxiety.\" Patient complaint of inability to calm mind down, \"I don't try to think about it, it just hits me.\"       8/9/22:  INITIAL EVAL  Patient presents today in office with a history of anxiety and depression.   Patient began treatment approximately 10-12 yrs ago with PCP's, has never seen a behavioral health provider.  Patient initial demeanor presents as anxious, somewhat guarded, and flat, however, as visit progressed patient was very open with current stressors and feelings.      Depression: Patient complains of depression. He complains of anhedonia, depressed mood, difficulty concentrating, fatigue, feelings of worthlessness/guilt and negative thoughts of feeling family would be better off without him, though denies actual suicidal thoughts.  Patient admits to \"if I " "was gone everyone would be better off, I would never do it, I just think about it, fleeting throughout different parts of the day.\"  Patient reports while working it is easier, however, endorses to increased anxiety related to work.  \"a lot of the issues stem from work as well.\"  Denies action plan.  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  Patient was then given some acid reflex medications and had a cardiac workup which was negative.  Patient was then started on Prozac 20 mg 4/26/22.      Current manager is different than before, has had past problems with current female supervisor in 12/2020 while helping putting cylinders away, co-worker put in wrong bins but patient name was on the work, however, patient was punished and had told union, \" she had taken me out of the computer and she refused to put me back in the computer, had arguments prior to this incident,\" patient feels retaliation from this supervisor which has weighed on patient as supervisor now giving patient the \"cold shoulder\". And has difficulty with anxiety.  Patient reports past employer at [employer] did not have any problems, however, patient feels he " "does not share same  interests as fellow employees.  Patient has been at [employer] for 23 yrs, \"and I still feel like an outsider.\"     Patient admits to having feelings of chest pressure and minimal sweating to palms while driving, \"anytime I get an anxious or stressful situation almost,\" patient takes Hydroxyzine 25 mg and feels in zombie state, and 3-4 hrs later will take Buspar 10 mg which causes drowsiness, denies dizziness.  Patient believes anxiety symptoms while driving began after incident earlier this year with the [email or web address], March 2022.    Patient admits to only taking Buspar as needed, does not need or take while off work, and on weekends takes once if working as there is a \"lighter management\" .  Patient reports he is unable to take Prozac at exact same time with Buspar and hydroxyzine due to feelings of \"spacing out\".   Patient taking Prozac every morning which patient states, \"it makes me a little more social.  Not so much while driving to work, it's only 2 min.from my house. 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 \"High traffic area gets my chest going and I get anxious.\"      PHQ-9 Depression Screening  PHQ-9 Total Score:   2/6/2025 19   Little interest or pleasure in doing things? (Patient-Rptd) Almost all   Feeling down, depressed, or hopeless? (Patient-Rptd) Over half   PHQ-2 Total Score (Patient-Rptd) 5   Trouble falling or staying asleep, or sleeping too much? (Patient-Rptd) Several days   Feeling tired or having little energy? (Patient-Rptd) Almost all   Poor appetite or overeating? (Patient-Rptd) Over half   Feeling bad about yourself - or that you are a failure or have let yourself or your family down? (Patient-Rptd) Almost all   Trouble concentrating on things, such as reading the newspaper or watching television? (Patient-Rptd) Almost all   Moving or speaking so slowly that other people could have noticed? " Or the opposite - being so fidgety or restless that you have been moving around a lot more than usual? (Patient-Rptd) Over half   Thoughts that you would be better off dead, or of hurting yourself in some way? (Patient-Rptd) Not at all   PHQ-9 Total Score (Patient-Rptd) 19   If you checked off any problems, how difficult have these problems made it for you to do your work, take care of things at home, or get along with other people? (Patient-Rptd) Very difficult        BEE-7  Feeling nervous, anxious or on edge: (Patient-Rptd) More than half the days  Not being able to stop or control worrying: (Patient-Rptd) Nearly every day  Worrying too much about different things: (Patient-Rptd) Nearly every day  Trouble Relaxing: (Patient-Rptd) Nearly every day  Being so restless that it is hard to sit still: (Patient-Rptd) Not at all  Feeling afraid as if something awful might happen: (Patient-Rptd) Nearly every day  Becoming easily annoyed or irritable: (Patient-Rptd) More than half the days  BEE 7 Total Score: (Patient-Rptd) 16  If you checked any problems, how difficult have these problems made it for you to do your work, take care of things at home, or get along with other people: (Patient-Rptd) Very difficult 2/6/2025    The following portions of the patient's history were reviewed and updated as appropriate: allergies, current medications, past family history, past medical history, past social history, and past surgical history.     Past Surgical History:  Past Surgical History:   Procedure Laterality Date    COLONOSCOPY N/A 7/26/2024    Procedure: COLONOSCOPY WITH POLYPECTOMY;  Surgeon: Pranav Granger MD;  Location: [facility] ENDOSCOPY;  Service: General;  Laterality: N/A;  DIVERTICULOSIS. COLON POLYP    ENDOSCOPY N/A 7/26/2024    Procedure: ESOPHAGOGASTRODUODENOSCOPY with biopsies;  Surgeon: Pranav Granger MD;  Location: [facility] ENDOSCOPY;  Service: General;  Laterality: N/A;  NORMAL    LUMBAR DECOMPRESSION   03/09/2016    [facility]  L4-5 lumbar decomp disckectomy and TLIF  sextant mtrix and stealth    LUMBAR FUSION Right 03/09/2016    Procedure: RT L4-5 LUMBAR DECOMPRESSION DISCECTOMY AND TLIF SEXTANT METRIX AND STEALTH;  Surgeon: Celestino Mackay MD;  Location: [facility] MAIN OR;  Service:     PLANTAR FASCIA RELEASE Left 02/09/2021    Procedure: LEFT FOOT ENDOSCOPIC PLANTAR FASCIOTOMY;  Surgeon: Rafy Bauer DPM;  Location: [facility] MAIN OR;  Service: Podiatry;  Laterality: Left;    SPINE SURGERY      lumbar spinal diskectomy L4/L5  L5/S1 2009    WISDOM TOOTH EXTRACTION         Problem List:  Patient Active Problem List   Diagnosis    Lumbosacral disc disease    Work related injury    Post-operative state    Decreased sensation    Low back pain    Vitamin D deficiency    Acquired hammer toe of left foot    Cavus deformity of left foot    Hyperkeratosis    Pain in left foot    Plantar fasciitis    Dysthymia    Anxiety    Major depressive disorder with single episode, in partial remission    Seasonal allergic rhinitis due to pollen    Class 2 obesity due to excess calories without serious comorbidity with body mass index (BMI) of 36.0 to 36.9 in adult    Pruritus    Screen for colon cancer    Overweight    Long-term use of high-risk medication    Allergic rhinitis    Gastroesophageal reflux disease without esophagitis    Gastroesophageal reflux disease with esophagitis without hemorrhage    Epigastric pain    Screening for malignant neoplasm of colon    Chest pain, atypical    Diastolic dysfunction       Allergy:   No Known Allergies     Discontinued Medications:  Medications Discontinued During This Encounter   Medication Reason    lidocaine (XYLOCAINE) 2 % jelly Patient Reported Not Taking                   Current Medications:   Current Outpatient Medications   Medication Sig Dispense Refill    Azelastine-Fluticasone 137-50 MCG/ACT suspension USE 1 SPRAY(S) IN EACH NOSTRIL TWICE DAILY      busPIRone (BUSPAR) 15 MG tablet  Take 0.5 tablets by mouth 2 (Two) Times a Day. for anxiety  Indications: Anxiety Disorder      FLUoxetine (PROzac) 40 MG capsule TAKE 2 CAPSULES BY MOUTH EVERY MORNING 180 capsule 1    metFORMIN ER (GLUCOPHAGE-XR) 500 MG 24 hr tablet Take 1 tablet by mouth Daily With Breakfast. 30 tablet 1    phentermine 37.5 MG capsule Take 1 capsule by mouth Every Morning. 30 capsule 0    sildenafil (REVATIO) 20 MG tablet 1-5 tablets po 1 hour prior to intercourse, must seek emergency evaluation if erection is maintained > 4 hours 60 tablet 1     No current facility-administered medications for this visit.       Past Medical History:  Past Medical History:   Diagnosis Date    Allergic My whole life    Started out hay fever, now just build up in nack of throat    Anxiety     Back pain     CHF (congestive heart failure) 2022    Not bad at moment, likely result of sleep apnea    Depression     Erectile dysfunction 1999    Head injury     Lumbar herniated disc     Lumbosacral disc disease     Obesity     Obstructive sleep apnea     C-PAP mask not working/ not currently treating    Panic disorder     PTSD (post-traumatic stress disorder)     Scoliosis My whole life    Spinal headache 2001    Had issues with neck pain    Work related injury 10/30/2014    reinjured 8-13-15       Past Psychiatric History:  Began Treatment: approximately 10 or more years ago with  PCP at the time  Diagnoses:Depression, Anxiety and suspected PTSD  Psychiatrist:Denies  Therapist: 2016 through workman's comp prior to back surgery  Admission History:Denies  Medication Trials: Xanax and Zoloft initally [id number] with -ineffective; 10-12 yrs ago placed on Prozac-ineffective previously; Effexor XR 8/9-9/13/22 sweating,zoned out,abd bloating; Buspar 10 mg as needed -drowsiness; Hydroxyzine-drowsiness with low dose of 10 mg, next day grogginess ; Lexapro-insomnia after 5 weeks, possible related to anxiety, though self stopped mid Oct.2022     Self Harm: Denies  Suicide Attempts:Denies    Substance Abuse History:   Types:Denies all, including illicit  Withdrawal Symptoms:Denies  Longest Period Sober:Not Applicable   AA: Not applicable     Social History: As of 8/9/24  Martial Status:  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, 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  Kids:Yes or If so, how many 1 son  age 19 will be 20 10/2024   House:Lives in a house   History: Denies  Access to Guns:  No    Social History     Socioeconomic History    Marital status:     Number of children: 1    Highest education level: High school graduate   Tobacco Use    Smoking status: Never     Passive exposure: Never    Smokeless tobacco: Never   Vaping Use    Vaping status: Never Used   Substance and Sexual Activity    Alcohol use: No    Drug use: Never    Sexual activity: Not Currently     Partners: Female     Birth control/protection: None       Family History:   Suicide Attempts: Denies  Suicide Completions:Denies      Family History   Problem Relation Age of Onset    Diabetes Father     Anxiety disorder Son         Kvng    ADD / ADHD Son     Depression Son     Mental illness Son        Developmental History:   Born: [place]  Siblings:1 brother  Childhood:  physical,mental-by father  High School:Completed  College:Denies    Mental Status Exam:   Hygiene:   good  Cooperation:  Cooperative  Eye Contact:  Good  Psychomotor Behavior:  Appropriate  Affect:  Appropriate  Mood: euthymic   Speech:  Normal  Thought Process:  Goal directed  Thought Content:  Mood congruent  Suicidal:  None  Homicidal:  None  Hallucinations:  None  Delusion:  None  Memory:  Intact  Orientation:  Person, Place, Time and Situation  Reliability:  good  Insight:  Good  Judgement:  Good  Impulse Control:  Good  Physical/Medical Issues:  Yes KAILASH-untreated due to claustraphobia with mask, L-Spine back pain,GERD      Review of Systems:  Review of Systems   Constitutional:   "Positive for fatigue. Negative for diaphoresis.   HENT:  Negative for drooling.    Eyes:  Negative for visual disturbance.   Respiratory:  Positive for apnea. Negative for cough and shortness of breath.         KAILASH, unable to tolerate mask, snores loudly   Cardiovascular:  Negative for chest pain, palpitations and leg swelling.   Gastrointestinal:  Negative for nausea and vomiting.   Endocrine: Negative for cold intolerance and heat intolerance.   Genitourinary:  Negative for difficulty urinating.   Musculoskeletal:  Negative for joint swelling.   Allergic/Immunologic: Negative for immunocompromised state.   Neurological:  Negative for dizziness, seizures, speech difficulty and numbness.   Psychiatric/Behavioral:  Positive for decreased concentration. Negative for agitation, confusion, hallucinations, self-injury, sleep disturbance and suicidal ideas. The patient is nervous/anxious. The patient is not hyperactive.          Physical Exam:  Physical Exam  Psychiatric:         Attention and Perception: Attention and perception normal.         Mood and Affect: Mood and affect normal.         Speech: Speech normal.         Behavior: Behavior normal. Behavior is cooperative.         Thought Content: Thought content normal. Thought content does not include suicidal ideation. Thought content does not include suicidal plan.         Cognition and Memory: Cognition and memory normal.         Judgment: Judgment normal.         Vital Signs:   /80   Pulse 94   Ht 175.3 cm (69\")   Wt 92.6 kg (204 lb 3.2 oz)   BMI 30.16 kg/m²      Office notes from care team reviewed:  Office Visit with Wanda Bautista APRN (01/30/2025)   UC with Verna Tim APRN (01/24/2025)   Office Visit with Fauzia Ballard MD (01/10/2025)     Lab Results:   Office Visit on 11/05/2024   Component Date Value Ref Range Status    Amphetamine Screen, Urine 11/05/2024 Negative  Negative Final    Barbiturates Screen, Urine 11/05/2024 " Negative  Negative Final    Buprenorphine, Screen, Urine 11/05/2024 Negative  Negative Final    Benzodiazepine Screen, Urine 11/05/2024 Negative  Negative Final    Cocaine Screen, Urine 11/05/2024 Negative  Negative Final    MDMA (ECSTASY) 11/05/2024 Negative  Negative Final    Methamphetamine, Ur 11/05/2024 Negative  Negative Final    Morphine/Opiates Screen, Urine 11/05/2024 Negative  Negative Final    Methadone Screen, Urine 11/05/2024 Negative  Negative Final    Oxycodone Screen, Urine 11/05/2024 Negative  Negative Final    Phencyclidine (PCP), Urine 11/05/2024 Negative  Negative Final    THC, Screen, Urine 11/05/2024 Negative  Negative Final    Lot Number 11/05/2024 [id number]   Final    Expiration Date 11/05/2024 7/4/26   Final   Ancillary Procedure on 09/18/2024   Component Date Value Ref Range Status    EF(MOD-bp) 09/18/2024 61.0  % Final    LVIDd 09/18/2024 4.9  cm Final    LVIDs 09/18/2024 3.6  cm Final    IVSd 09/18/2024 1.0  cm Final    LVPWd 09/18/2024 1.0  cm Final    MV E max aden 09/18/2024 60.0  cm/sec Final    MV A max aden 09/18/2024 51.0  cm/sec Final    MV dec time 09/18/2024 207  sec Final    MV E/A 09/18/2024 1.0   Final    IVRT 09/18/2024 90.0  ms Final    LA ESV Index (BP) 09/18/2024 22.0  ml/m2 Final    Med Peak E' Aden 09/18/2024 6.00  cm/sec Final    Lat Peak E' Aden 09/18/2024 13.0  cm/sec Final    Avg E/e' ratio 09/18/2024 6.32   Final    TAPSE (>1.6) 09/18/2024 1.80  cm Final    LA dimension (2D)  09/18/2024 3.6  cm Final    MV P1/2t 09/18/2024 61.00  msec Final    PI end-d aden 09/18/2024 79  cm/sec Final    Ascending aorta 09/18/2024 2.7  cm Final    Aortic arch 09/18/2024 2.9  cm Final   Lab on 08/15/2024   Component Date Value Ref Range Status    PSA 08/15/2024 0.563  0.000 - 4.000 ng/mL Final       EKG Results:  No orders to display     EKG dated 1/23/24, NSR, QT-367, Qtc-437, HR 85     Imaging Results:  No Images in the past 120 days found..      Assessment & Plan   Diagnoses and all  orders for this visit:    1. Generalized anxiety disorder (Primary)    2. Mild episode of recurrent major depressive disorder    3. Medication management    4. Medication care plan discussed with patient                      Visit Diagnoses:    ICD-10-CM ICD-9-CM   1. Generalized anxiety disorder  F41.1 300.02   2. Mild episode of recurrent major depressive disorder  F33.0 296.31   3. Medication management  Z79.899 V58.69   4. Medication care plan discussed with patient  Z71.89 V65.49                     PLAN:  Safety: No acute safety concerns  Therapy: Currently Seeing a Therapist SHIRLEY Leslie,  seeing 1-2 times monthly  Risk Assessment: Risk of self-harm acutely is moderate  Risk factors include anxiety disorder, mood disorder,fleeting negative thoughts, and recent psychosocial stressors (pandemic). Protective factors include no family history, denies access to guns/weapons, no present SI, no history of suicide attempts or self-harm in the past, minimal AODA, healthcare seeking, future orientation, willingness to engage in care.  Risk of self-harm chronically is also moderate, but could be further elevated in the event of treatment noncompliance and/or AODA.  Meds:  -Continue Prozac (Fluoxetine) 80 mg by mouth daily in the morning to target anxiety and depression.     -Continue Buspar 7.5 mg by mouth twice daily ROUTINELY to target anxiety. Absorption is affected by food, so administration with or without food should be consistent. Risks, benefits, alternatives discussed with patient including nausea, GI upset, dizziness, mild sedation, and falls risk.  After discussion of these risks and benefits, the patient voiced understanding and agreed to proceed. Patient instructed break the 15 mg tab in half and START taking at 11 am with lunch and continue dose at 5pm with dinner. NR needed.     5.  Labs:  N/a  6.  Patient instructed to request refills when appropriate, when down to 5-7 days remaining via  pharmacy or via [employer].       Symptoms of anxiety, depression are under good control with current medication regimen.    The plan was discussed with the patient. The patient was given time to ask questions and these questions were answered. At the conclusion of their visit they had no additional questions or concerns and all questions were answered to their satisfaction.   Patient was given instructions and counseling regarding condition and for health maintenance advice. Please see specific information pulled into the AVS if appropriate.    Patient to contact provider if symptoms worsen or fail to improve.        11/19/24:  -Continue Prozac (Fluoxetine) 80 mg by mouth daily in the morning to target anxiety and depression.     -CHANGE Buspar FROM 15 mg daily as needed TO 7.5 mg by mouth twice daily ROUTINELY to target anxiety. Absorption is affected by food, so administration with or without food should be consistent. Risks, benefits, alternatives discussed with patient including nausea, GI upset, dizziness, mild sedation, and falls risk.  After discussion of these risks and benefits, the patient voiced understanding and agreed to proceed. Patient instructed break the 15 mg tab in half, if unable to tolerate due to drowsiness or dizziness, please call provider and will send in the 5 mg tabs. As patient is uncertain of shape and scoring of current 15 mg tablet.  UPDATED ORDER AS A NO PRINT, as patient has adequate supply.      Symptoms of anxiety, depression are under fair to good control with current medication regimen. Discussed option to try an alternative medication if current medication regimen does not improve symptoms.  However, there is a possibility the episode patient endured last week was situational with work setting, therefore, will switch the Buspar to routine at lower dose, and have patient follow up in 7 weeks and keep the previously scheduled appointment for Feb. At this time. The plan was discussed  with the patient. The patient was given time to ask questions and these questions were answered. At the conclusion of their visit they had no additional questions or concerns and all questions were answered to their satisfaction.   Patient was given instructions and counseling regarding condition and for health maintenance advice. Please see specific information pulled into the AVS if appropriate.    Patient to contact provider if symptoms worsen or fail to improve.        8/9/24:   -Therapy: Currently Seeing a Therapist SHIRLEY Leslie,  seeing monthly.  -Continue Prozac (Fluoxetine) 80 mg by mouth daily in the morning to target anxiety and depression    Patient does not meet criteria for ADHD.  Symptoms described are related to underlying social anxiety and generalized anxiety.  Patient described while taking Phentermine it helped his mood and wished to discuss the effectiveness and start a stimulant for ADHD he self presumed to have based on feelings while taking stimulant medication.  Explained to patient stimulant medications are utilized for ADHD with the exception of Phentermine for weight loss, if patient wished to have formal testing he may seek, however, based on evaluation today and past visits with patient, ADHD was not a differential diagnosis. Discussed options for non-stimulant medications such as Atomoxetine or Wellbutrin XL, however, both are not recommended with coadministration of Prozac.   The plan was discussed with the patient. The patient was given time to ask questions and these questions were answered. At the conclusion of their visit they had no additional questions or concerns and all questions were answered to their satisfaction.   Patient was given instructions and counseling regarding condition and for health maintenance advice. Please see specific information pulled into the AVS if appropriate.    Patient to contact provider if symptoms worsen or fail to improve.   "    7/29/24: TELEPHONE  Received a secure chat message from [facility] office reported patient had called to move up his appointment scheduled in Nov. Due to his PCP telling him he needed to be on ADHD medications but could not be prescribed phentermine with an ADHD stimulant medication.  Patient is scheduled for this Friday 8/2/24.  Secure chat message sent to PCP to discuss request after chart review did not indicate a prior discussion with PCP. Per discussion with PCP, patient had not been told to be placed on a medication for ADHD and that she had agreed to prescribe Phentermine for 3 months with a 3 month break between, which patient had mentioned previously at last visit.      Called patient to clarify reason for an earlier visit as ADHD evaluations are scheduled for longer appointment times. Patient reports when he last seen PCP in June, was told he may have an underlying diagnosis of ADHD based on patient feeling more sharp while taking Phentermine.  Patient was informed with any stimulant medication it does effect the brain differently and likely did feel \"sharper\".  Patient does wish to proceed with an ADHD evaluation, patient has to be at work at 330 pm which is near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

## 2025-02-18 ENCOUNTER — OFFICE VISIT (OUTPATIENT)
Dept: FAMILY MEDICINE CLINIC | Age: 50
End: 2025-02-18
Payer: COMMERCIAL

## 2025-02-18 VITALS
BODY MASS INDEX: 30.51 KG/M2 | WEIGHT: 206 LBS | TEMPERATURE: 97.5 F | HEIGHT: 69 IN | OXYGEN SATURATION: 97 % | HEART RATE: 85 BPM | SYSTOLIC BLOOD PRESSURE: 123 MMHG | DIASTOLIC BLOOD PRESSURE: 80 MMHG

## 2025-02-18 DIAGNOSIS — E66.811 CLASS 1 OBESITY DUE TO EXCESS CALORIES WITHOUT SERIOUS COMORBIDITY WITH BODY MASS INDEX (BMI) OF 30.0 TO 30.9 IN ADULT: ICD-10-CM

## 2025-02-18 DIAGNOSIS — E66.09 CLASS 1 OBESITY DUE TO EXCESS CALORIES WITHOUT SERIOUS COMORBIDITY WITH BODY MASS INDEX (BMI) OF 30.0 TO 30.9 IN ADULT: ICD-10-CM

## 2025-02-18 DIAGNOSIS — I50.32 CHRONIC DIASTOLIC (CONGESTIVE) HEART FAILURE: ICD-10-CM

## 2025-02-18 DIAGNOSIS — Z71.3 WEIGHT LOSS COUNSELING, ENCOUNTER FOR: Primary | ICD-10-CM

## 2025-02-18 DIAGNOSIS — Z13.6 ENCOUNTER FOR SCREENING FOR CARDIOVASCULAR DISORDERS: ICD-10-CM

## 2025-02-18 DIAGNOSIS — F41.9 ANXIETY AND DEPRESSION: ICD-10-CM

## 2025-02-18 DIAGNOSIS — B02.9 HERPES ZOSTER WITHOUT COMPLICATION: ICD-10-CM

## 2025-02-18 DIAGNOSIS — G47.33 OBSTRUCTIVE SLEEP APNEA SYNDROME: ICD-10-CM

## 2025-02-18 DIAGNOSIS — K21.9 GASTROESOPHAGEAL REFLUX DISEASE WITHOUT ESOPHAGITIS: ICD-10-CM

## 2025-02-18 DIAGNOSIS — E55.9 VITAMIN D DEFICIENCY: ICD-10-CM

## 2025-02-18 DIAGNOSIS — F32.A ANXIETY AND DEPRESSION: ICD-10-CM

## 2025-02-18 PROCEDURE — 99214 OFFICE O/P EST MOD 30 MIN: CPT | Performed by: FAMILY MEDICINE

## 2025-02-18 RX ORDER — PHENTERMINE HYDROCHLORIDE 37.5 MG/1
37.5 CAPSULE ORAL EVERY MORNING
Qty: 30 CAPSULE | Refills: 0 | Status: SHIPPED | OUTPATIENT
Start: 2025-02-18

## 2025-02-18 NOTE — PROGRESS NOTES
Pranav Calderon presents to [facility].    Chief Complaint: weight loss follow-up    Subjective     History of Present Illness:  HPI    He has vit D deficiency and not on vitamin D supplementation    He presents with ED and is currently stable on Viagra and tolerating med well.   History of negative CT calcium cardiac scan.    He has sleep apnea and is on CPAP and tolerating well    Pranav is following up for his ongoing weight loss program.  He recently had shingles and is plateaued on his weight loss since he has been in so much pain.  He had been doing incredibly well and lost 17 pounds total.  He is sleeping okay at night.  He tolerates phentermine well.  His BMI is down to 30.41.  He is trying to exercise daily and weight lift.  He is also working hard on healthy diet.     He presents for follow-up for his chronic anxiety and depression.  He sees [facility] and a therapist Elyssa Dillard.  He is on Prozac and tolerates medication well.  He feels like he is functional and stable at this time.  He typically sees his therapist twice a month and [facility]ario once every 3 months although he does get flareups 1-2 times a month with intermittent feelings of depression and sadness.  Denies HI/SI.    Result Review   The following data was reviewed by Fauzia Ballard MD on 11/05/2024.  Lab Results   Component Value Date    WBC 6.08 07/25/2023    HGB 15.8 07/25/2023    HCT 48.4 07/25/2023    MCV 95.7 07/25/2023     07/25/2023     Lab Results   Component Value Date    GLUCOSE 96 07/25/2023    BUN 15 07/25/2023    CREATININE 1.05 07/25/2023     07/25/2023    K 4.3 07/25/2023     07/25/2023    CALCIUM 9.6 07/25/2023    PROTEINTOT 7.2 07/25/2023    ALBUMIN 4.4 07/25/2023    ALT 48 (H) 07/25/2023    AST 36 07/25/2023    ALKPHOS 46 07/25/2023    BILITOT 0.4 07/25/2023    GLOB 2.8 07/25/2023    AGRATIO 1.6 07/25/2023    BCR 14.3 07/25/2023    ANIONGAP 9.0  "07/25/2023    EGFR 88.7 07/25/2023     Lab Results   Component Value Date    CHOL 170 07/25/2023    CHLPL 172 08/05/2020    TRIG 86 07/25/2023    HDL 56 07/25/2023    LDL 98 07/25/2023     Lab Results   Component Value Date    TSH 1.870 07/25/2023     Lab Results   Component Value Date    HGBA1C 5.50 07/25/2023     Lab Results   Component Value Date    PSA 0.563 08/15/2024    PSA 0.483 07/25/2023     No results found for: \"IRON\"   Lab Results   Component Value Date    TGXR07BN 29.0 (L) 07/25/2023               Assessment and Plan:   Diagnoses and all orders for this visit:    1. Weight loss counseling, encounter for (Primary)  Comments:  Will refill his phentermine and have him follow-up in 4 months.  He is to continue healthy diet and daily exercise    2. Vitamin D deficiency  Comments:  Will check a vitamin D lab and may start vitamin D supplementation pending results  Orders:  -     Vitamin D,25-Hydroxy; Future    3. Encounter for screening for cardiovascular disorders  -     Comprehensive Metabolic Panel; Future  -     CBC (No Diff); Future  -     Lipid Panel; Future    4. Obstructive sleep apnea syndrome  Comments:  Stable on CPAP.  No changes to current treatment plan    5. Gastroesophageal reflux disease without esophagitis  Comments:  Overall stable.  He is to avoid spicy and acidic food in his diet    6. Anxiety and depression  Comments:  Stable on current meds, sleeping well at night.  No HI/SI.  He is to follow-up with psychiatry for ongoing eval and treatment    7. Herpes zoster without complication  Comments:  Symptoms have resolved.  He still has some mild pain and can try topical hydrocortisone cream    8. Chronic diastolic (congestive) heart failure  Comments:  No acute issues.  He is to follow-up with cardiology as directed.    9. Class 1 obesity due to excess calories without serious comorbidity with body mass index (BMI) of 30.0 to 30.9 in adult  -     phentermine 37.5 MG capsule; Take 1 capsule " "by mouth Every Morning.  Dispense: 30 capsule; Refill: 0                  Objective     Medications:  Current Outpatient Medications   Medication Instructions    Azelastine-Fluticasone 137-50 MCG/ACT suspension USE 1 SPRAY(S) IN EACH NOSTRIL TWICE DAILY    busPIRone (BUSPAR) 7.5 mg, Oral, 2 Times Daily, for anxiety    FLUoxetine (PROZAC) 80 mg, Oral, Every Morning    metFORMIN ER (GLUCOPHAGE-XR) 500 mg, Oral, Daily With Breakfast    phentermine 37.5 mg, Oral, Every Morning    sildenafil (REVATIO) 20 MG tablet 1-5 tablets po 1 hour prior to intercourse, must seek emergency evaluation if erection is maintained > 4 hours        Vital Signs:   /80 (BP Location: Right arm, Patient Position: Sitting, Cuff Size: Adult)   Pulse 85   Temp 97.5 °F (36.4 °C) (Oral)   Ht 175.3 cm (69.02\")   Wt 93.4 kg (206 lb)   SpO2 97%   BMI 30.41 kg/m²             Physical Exam:  Physical Exam  Vitals and nursing note reviewed.   Constitutional:       General: He is not in acute distress.     Appearance: Normal appearance. He is not ill-appearing, toxic-appearing or diaphoretic.   HENT:      Head: Normocephalic and atraumatic.      Right Ear: Tympanic membrane, ear canal and external ear normal.      Left Ear: Tympanic membrane, ear canal and external ear normal.      Nose: Nose normal. No congestion or rhinorrhea.      Mouth/Throat:      Pharynx: Oropharynx is clear. No oropharyngeal exudate or posterior oropharyngeal erythema.   Eyes:      Conjunctiva/sclera: Conjunctivae normal.   Cardiovascular:      Rate and Rhythm: Normal rate.      Pulses: Normal pulses.   Pulmonary:      Effort: Pulmonary effort is normal. No respiratory distress.      Breath sounds: Normal breath sounds. No stridor. No wheezing, rhonchi or rales.   Abdominal:      General: Abdomen is flat.      Palpations: Abdomen is soft.      Tenderness: There is no abdominal tenderness.   Musculoskeletal:         General: Normal range of motion.      Cervical back: " Normal range of motion and neck supple. No rigidity.   Lymphadenopathy:      Cervical: No cervical adenopathy.   Skin:     General: Skin is warm and dry.      Capillary Refill: Capillary refill takes less than 2 seconds.          Neurological:      Mental Status: He is alert and oriented to person, place, and time.      Motor: No weakness.      Gait: Gait normal.   Psychiatric:         Mood and Affect: Mood normal.         Behavior: Behavior normal.         Thought Content: Thought content normal.         Judgment: Judgment normal.           Review of Systems:  Review of Systems   Constitutional:  Negative for chills, diaphoresis, fatigue and fever.   HENT:  Negative for congestion, ear discharge, sore throat and swollen glands.    Respiratory:  Negative for cough, shortness of breath and wheezing.    Cardiovascular:  Positive for chest pain. Negative for palpitations and leg swelling.   Gastrointestinal:  Negative for abdominal pain, constipation, diarrhea, nausea, vomiting and GERD.   Genitourinary:  Negative for dysuria and flank pain.   Musculoskeletal:  Positive for neck pain. Negative for myalgias.   Skin:  Negative for rash.   Neurological:  Negative for dizziness, weakness, numbness and headache.   Psychiatric/Behavioral:  Positive for depressed mood. Negative for sleep disturbance and suicidal ideas. The patient is nervous/anxious.               Follow Up   Return in about 4 months (around 6/18/2025) for Recheck.    Part of this note may be an electronic transcription/translation of spoken language to printed   text using the Dragon Dictation System.            Medical History:  Medications Discontinued During This Encounter   Medication Reason    phentermine 37.5 MG capsule Reorder        Past Medical History:    Allergic    Started out hay fever, now just build up in nack of throat    Anxiety    Back pain    CHF (congestive heart failure)    Not bad at moment, likely result of sleep apnea    Depression     Erectile dysfunction    Head injury    Lumbar herniated disc    Lumbosacral disc disease    Obesity    Obstructive sleep apnea    C-PAP mask not working/ not currently treating    Panic disorder    PTSD (post-traumatic stress disorder)    Scoliosis    Spinal headache    Had issues with neck pain    Work related injury    reinjured 8-13-15     Past Surgical History:    COLONOSCOPY    Procedure: COLONOSCOPY WITH POLYPECTOMY;  Surgeon: Pranav Granger MD;  Location: [facility] ENDOSCOPY;  Service: General;  Laterality: N/A;  DIVERTICULOSIS. COLON POLYP    ENDOSCOPY    Procedure: ESOPHAGOGASTRODUODENOSCOPY with biopsies;  Surgeon: Pranav Granger MD;  Location: [facility] ENDOSCOPY;  Service: General;  Laterality: N/A;  NORMAL    LUMBAR DECOMPRESSION    JEH  L4-5 lumbar decomp disckectomy and TLIF  sextant mtrix and stealth    LUMBAR FUSION    Procedure: RT L4-5 LUMBAR DECOMPRESSION DISCECTOMY AND TLIF SEXTANT METRIX AND STEALTH;  Surgeon: Celestino Mackay MD;  Location: [facility] MAIN OR;  Service:     PLANTAR FASCIA RELEASE    Procedure: LEFT FOOT ENDOSCOPIC PLANTAR FASCIOTOMY;  Surgeon: Rafy Bauer DPM;  Location: [facility] MAIN OR;  Service: Podiatry;  Laterality: Left;    SPINE SURGERY    lumbar spinal diskectomy L4/L5  L5/S1 2009    WISDOM TOOTH EXTRACTION      Family History   Problem Relation Age of Onset    Diabetes Father     Anxiety disorder Son         Kvng    ADD / ADHD Son     Depression Son     Mental illness Son      Social History     Tobacco Use    Smoking status: Never     Passive exposure: Never    Smokeless tobacco: Never   Substance Use Topics    Alcohol use: No       There are no preventive care reminders to display for this patient.       Immunization History   Administered Date(s) Administered    COVID-19 (PFIZER) 12YRS+ (COMIRNATY) 01/23/2024    COVID-19 (PFIZER) Purple Cap Monovalent 04/02/2021, 04/28/2021    Flu Vaccine Intradermal Quad 18-64YR 10/19/2021    Influenza Injectable Mdck  Pf Quad 10/19/2021, 10/25/2022, 09/28/2023    Influenza, Unspecified 10/25/2022    Tdap 07/25/2023       No Known Allergies   Answers submitted by the patient for this visit:  Primary Reason for Visit (Submitted on 1/3/2025)  What is the primary reason for your visit?: Problem Not Listed

## 2025-02-25 DIAGNOSIS — E66.09 CLASS 1 OBESITY DUE TO EXCESS CALORIES WITHOUT SERIOUS COMORBIDITY WITH BODY MASS INDEX (BMI) OF 30.0 TO 30.9 IN ADULT: ICD-10-CM

## 2025-02-25 DIAGNOSIS — Z71.3 WEIGHT LOSS COUNSELING, ENCOUNTER FOR: Primary | ICD-10-CM

## 2025-02-25 DIAGNOSIS — E66.811 CLASS 1 OBESITY DUE TO EXCESS CALORIES WITHOUT SERIOUS COMORBIDITY WITH BODY MASS INDEX (BMI) OF 30.0 TO 30.9 IN ADULT: ICD-10-CM

## 2025-02-25 RX ORDER — PHENTERMINE HYDROCHLORIDE 37.5 MG/1
37.5 TABLET ORAL
Qty: 30 TABLET | Refills: 0 | Status: SHIPPED | OUTPATIENT
Start: 2025-02-25

## 2025-03-01 DIAGNOSIS — N52.9 ERECTILE DYSFUNCTION, UNSPECIFIED ERECTILE DYSFUNCTION TYPE: ICD-10-CM

## 2025-03-03 RX ORDER — SILDENAFIL CITRATE 20 MG/1
TABLET ORAL
Qty: 60 TABLET | Refills: 1 | Status: SHIPPED | OUTPATIENT
Start: 2025-03-03

## 2025-03-09 DIAGNOSIS — E66.811 CLASS 1 OBESITY DUE TO EXCESS CALORIES WITHOUT SERIOUS COMORBIDITY WITH BODY MASS INDEX (BMI) OF 31.0 TO 31.9 IN ADULT: ICD-10-CM

## 2025-03-09 DIAGNOSIS — E66.09 CLASS 1 OBESITY DUE TO EXCESS CALORIES WITHOUT SERIOUS COMORBIDITY WITH BODY MASS INDEX (BMI) OF 31.0 TO 31.9 IN ADULT: ICD-10-CM

## 2025-03-09 DIAGNOSIS — Z71.3 WEIGHT LOSS COUNSELING, ENCOUNTER FOR: ICD-10-CM

## 2025-03-10 RX ORDER — METFORMIN HYDROCHLORIDE 500 MG/1
500 TABLET, EXTENDED RELEASE ORAL
Qty: 30 TABLET | Refills: 1 | Status: SHIPPED | OUTPATIENT
Start: 2025-03-10

## 2025-03-20 ENCOUNTER — TELEPHONE (OUTPATIENT)
Dept: FAMILY MEDICINE CLINIC | Age: 50
End: 2025-03-20
Payer: COMMERCIAL

## 2025-03-21 ENCOUNTER — TELEPHONE (OUTPATIENT)
Dept: FAMILY MEDICINE CLINIC | Age: 50
End: 2025-03-21
Payer: COMMERCIAL

## 2025-03-21 NOTE — TELEPHONE ENCOUNTER
Pt was called to collect the fee for the [facility] paperwork that we received through fax. A voicemail was left. The paperwork was attached to this encounter and placed in the incomplete box.

## 2025-03-28 ENCOUNTER — LAB (OUTPATIENT)
Dept: LAB | Facility: HOSPITAL | Age: 50
End: 2025-03-28
Payer: COMMERCIAL

## 2025-03-28 DIAGNOSIS — Z13.6 ENCOUNTER FOR SCREENING FOR CARDIOVASCULAR DISORDERS: ICD-10-CM

## 2025-03-28 DIAGNOSIS — E66.09 CLASS 1 OBESITY DUE TO EXCESS CALORIES WITHOUT SERIOUS COMORBIDITY WITH BODY MASS INDEX (BMI) OF 31.0 TO 31.9 IN ADULT: ICD-10-CM

## 2025-03-28 DIAGNOSIS — E66.811 CLASS 1 OBESITY DUE TO EXCESS CALORIES WITHOUT SERIOUS COMORBIDITY WITH BODY MASS INDEX (BMI) OF 31.0 TO 31.9 IN ADULT: ICD-10-CM

## 2025-03-28 DIAGNOSIS — Z71.3 WEIGHT LOSS COUNSELING, ENCOUNTER FOR: ICD-10-CM

## 2025-03-28 DIAGNOSIS — E55.9 VITAMIN D DEFICIENCY: ICD-10-CM

## 2025-03-28 LAB
25(OH)D3 SERPL-MCNC: 38.9 NG/ML (ref 30–100)
DEPRECATED RDW RBC AUTO: 44.8 FL (ref 37–54)
ERYTHROCYTE [DISTWIDTH] IN BLOOD BY AUTOMATED COUNT: 12.5 % (ref 12.3–15.4)
HCT VFR BLD AUTO: 46.4 % (ref 37.5–51)
HGB BLD-MCNC: 15.1 G/DL (ref 13–17.7)
MCH RBC QN AUTO: 31.4 PG (ref 26.6–33)
MCHC RBC AUTO-ENTMCNC: 32.5 G/DL (ref 31.5–35.7)
MCV RBC AUTO: 96.5 FL (ref 79–97)
PLATELET # BLD AUTO: 217 10*3/MM3 (ref 140–450)
PMV BLD AUTO: 10.8 FL (ref 6–12)
RBC # BLD AUTO: 4.81 10*6/MM3 (ref 4.14–5.8)
WBC NRBC COR # BLD AUTO: 4.05 10*3/MM3 (ref 3.4–10.8)

## 2025-03-28 PROCEDURE — 80061 LIPID PANEL: CPT

## 2025-03-28 PROCEDURE — 36415 COLL VENOUS BLD VENIPUNCTURE: CPT

## 2025-03-28 PROCEDURE — 80053 COMPREHEN METABOLIC PANEL: CPT

## 2025-03-28 PROCEDURE — 82306 VITAMIN D 25 HYDROXY: CPT

## 2025-03-28 PROCEDURE — 85027 COMPLETE CBC AUTOMATED: CPT

## 2025-03-28 RX ORDER — METFORMIN HYDROCHLORIDE 500 MG/1
TABLET, EXTENDED RELEASE ORAL
Qty: 60 TABLET | Refills: 1 | Status: SHIPPED | OUTPATIENT
Start: 2025-03-28

## 2025-03-29 LAB
ALBUMIN SERPL-MCNC: 4.3 G/DL (ref 3.5–5.2)
ALBUMIN/GLOB SERPL: 1.6 G/DL
ALP SERPL-CCNC: 53 U/L (ref 39–117)
ALT SERPL W P-5'-P-CCNC: 27 U/L (ref 1–41)
ANION GAP SERPL CALCULATED.3IONS-SCNC: 7.6 MMOL/L (ref 5–15)
AST SERPL-CCNC: 29 U/L (ref 1–40)
BILIRUB SERPL-MCNC: 0.5 MG/DL (ref 0–1.2)
BUN SERPL-MCNC: 8 MG/DL (ref 6–20)
BUN/CREAT SERPL: 7 (ref 7–25)
CALCIUM SPEC-SCNC: 9.8 MG/DL (ref 8.6–10.5)
CHLORIDE SERPL-SCNC: 103 MMOL/L (ref 98–107)
CHOLEST SERPL-MCNC: 156 MG/DL (ref 0–200)
CO2 SERPL-SCNC: 27.4 MMOL/L (ref 22–29)
CREAT SERPL-MCNC: 1.14 MG/DL (ref 0.76–1.27)
EGFRCR SERPLBLD CKD-EPI 2021: 79.8 ML/MIN/1.73
GLOBULIN UR ELPH-MCNC: 2.7 GM/DL
GLUCOSE SERPL-MCNC: 87 MG/DL (ref 65–99)
HDLC SERPL-MCNC: 66 MG/DL (ref 40–60)
LDLC SERPL CALC-MCNC: 78 MG/DL (ref 0–100)
LDLC/HDLC SERPL: 1.19 {RATIO}
POTASSIUM SERPL-SCNC: 4.9 MMOL/L (ref 3.5–5.2)
PROT SERPL-MCNC: 7 G/DL (ref 6–8.5)
SODIUM SERPL-SCNC: 138 MMOL/L (ref 136–145)
TRIGL SERPL-MCNC: 57 MG/DL (ref 0–150)
VLDLC SERPL-MCNC: 12 MG/DL (ref 5–40)

## 2025-05-14 DIAGNOSIS — N52.9 ERECTILE DYSFUNCTION, UNSPECIFIED ERECTILE DYSFUNCTION TYPE: ICD-10-CM

## 2025-05-14 RX ORDER — SILDENAFIL CITRATE 20 MG/1
TABLET ORAL
Qty: 60 TABLET | Refills: 1 | Status: SHIPPED | OUTPATIENT
Start: 2025-05-14

## 2025-05-23 ENCOUNTER — OFFICE VISIT (OUTPATIENT)
Dept: BEHAVIORAL HEALTH | Facility: CLINIC | Age: 50
End: 2025-05-23
Payer: COMMERCIAL

## 2025-05-23 VITALS
WEIGHT: 200.8 LBS | HEART RATE: 89 BPM | DIASTOLIC BLOOD PRESSURE: 80 MMHG | SYSTOLIC BLOOD PRESSURE: 106 MMHG | BODY MASS INDEX: 29.74 KG/M2 | HEIGHT: 69 IN

## 2025-05-23 DIAGNOSIS — F41.1 GENERALIZED ANXIETY DISORDER: Primary | ICD-10-CM

## 2025-05-23 DIAGNOSIS — Z79.899 MEDICATION DOSE INCREASED: ICD-10-CM

## 2025-05-23 DIAGNOSIS — F33.0 MILD EPISODE OF RECURRENT MAJOR DEPRESSIVE DISORDER: ICD-10-CM

## 2025-05-23 DIAGNOSIS — Z71.89 MEDICATION CARE PLAN DISCUSSED WITH PATIENT: ICD-10-CM

## 2025-05-23 DIAGNOSIS — Z56.6 STRESS AT WORK: ICD-10-CM

## 2025-05-23 DIAGNOSIS — Z79.899 MEDICATION MANAGEMENT: ICD-10-CM

## 2025-05-23 RX ORDER — ALBUTEROL SULFATE 90 UG/1
INHALANT RESPIRATORY (INHALATION)
COMMUNITY

## 2025-05-23 RX ORDER — BUSPIRONE HYDROCHLORIDE 15 MG/1
15 TABLET ORAL 2 TIMES DAILY
Qty: 180 TABLET | Refills: 1 | Status: SHIPPED | OUTPATIENT
Start: 2025-05-23 | End: 2025-11-19

## 2025-05-23 SDOH — HEALTH STABILITY - MENTAL HEALTH: OTHER PHYSICAL AND MENTAL STRAIN RELATED TO WORK: Z56.6

## 2025-05-23 NOTE — PROGRESS NOTES
Subjective   Pranav Calderon is a 49 y.o. male who presents today for follow up    Referring Provider:  No referring provider defined for this encounter.    Chief Complaint:  Anxiety, depression, stress at work, medication management, medication care plan discussed, medication dose increased    Answers submitted by the patient for this visit:  Depression (Submitted on 5/16/2025)  Chief Complaint: Depression  Visit: follow-up  Frequency: most days  Severity: severe  excessive worry: Yes  insomnia: No  irritability: Yes  malaise/fatigue: Yes  obsessions: Yes  hypersomnia: Yes  difficulty controlling mood: Yes  Medication compliance: %  Aggravated by: work stress    History of Present Illness:    5/23/25: Patient presents today in office for an early appointment due to patient was already off work today and wanted to schedule the same day as other appointment for therapy.  Patient denies any new changes with mood. Has experienced increased work stress.  Any situations outside of work is able to manage as patient feels the Prozac has been helping. Patient is now able to tolerate Buspar 15 mg without drowsiness, instead of the 7.5 due to worsening anxiety. Patient has only been taking the 15 mg once daily in the morning.  Overall mood has been stable.   Patient has returned to 2nd shift due to environment, now working 4p-12am.     2/11/25:    Answers submitted by the patient for this visit:  Depression (Submitted on 2/4/2025)  Chief Complaint: Depression  Visit: follow-up  Frequency: most days  Severity: severe  excessive worry: Yes  insomnia: No  irritability: Yes  malaise/fatigue: Yes  obsessions: Yes  hypersomnia: No  difficulty controlling mood: Yes  Medication compliance: %  Side effects: sexual problems  Aggravated by: family issues, work stress    Patient presents today in office at last visit Buspar was changed from 15 mg daily as needed (previously ordered per PCP) to 7.5 mg twice daily  "routinely, for which patient reports only taking at 5 pm when he gets home from work with dinner, due to morning dose not feeling effective though has considered due to taking Prozac and Phentermine in the morning.  Patient has switched work hours to 8am-4pm. Patient takes lunch break at 11 am. Has not noticed feeling more drowsy after Buspar dose, though feels he does get to sleep faster, though hard to say if it is related to season.     Recalls in Nov. The day before his therapy appointment had a difficult time at work, though was able to cope with it better due to seeing therapist the following day. Reports having anger, stress, brought back memories and had some chest pain from the anxiety, similar to initial symptoms.  \"Compared to when it started I feel I know how to tame it, the Prozac is giving me the opportunity to fight it.\"    Patient reports being sick with a stomach bug twice, and then was diagnosed with shingles  a few days after a stressful episode at work. Though since that time has not felt very anxious nor depressed.    Patient had went to 2nd shift previously due to struggles with anxiety, though feels returning back to first shift will allow him to get on a good routine with new medication and will help family, has a positive outlook.     Depression: Patient complains of depression. He complains of anhedonia, depressed mood, difficulty concentrating, fatigue, and feelings of worthlessness/guilt  Anxiety:  The patient endorses to the following symptoms of anxiety including: excessive anxiety and worry about a number of events or activities for more days than not, being easily fatigued, difficulty concentrating or mind going blank, and irritability which have caused impairment in important areas of daily functioning.       11/19/24:    Answers submitted by the patient for this visit:  Primary Reason for Visit (Submitted on 11/16/2024)  What is the primary reason for your visit?: " "Depression  Depression (Submitted on 11/16/2024)  Chief Complaint: Depression  Visit: follow-up  Frequency: constantly  Severity: severe  excessive worry: Yes  insomnia: Yes  irritability: Yes  malaise/fatigue: Yes  obsessions: Yes  hypersomnia: Yes  difficulty controlling mood: Yes  difficulty staying asleep: No  difficulty falling asleep: No  Hours of sleep per night: 6 Hours  Medication compliance: %  Aggravated by: family issues, social activities, work stress  Additional information: Having panic attacks too.    Patient presents today in office for an earlier appointment due to worsened anxiety and depression, \"last week was the worst week I had\", patient had to take off work 3 days and left early one day, reports going home and cried without known reason, patient is adherent with Prozac. Over the weekend was able to \"reset\" and returned to work yesterday. Patient recalls having difficulty concentrating, felt some dizziness, and overall felt down. Then began to think of requiring psychotropic medications to maintain stable mood.  Felt anxious about anyone approaching him to talk.  Patient denies a specific trigger of past events that could have been reason for depressive symptoms last week. \"Its not one thing, its many things.\"  Recalls discussion with therapist, several issues occur at work and has been catastrophizing. Some days he feels the Prozac works and other days not.  Continues to see therapist 1-2 times per month.     In regards to PHQ9 screening question of thoughts of being better off dead or hurting himself in some way, patient reports thoughts of being better off dead last week, denies thoughts of killing himself, \"if I was not here, everyone would be better off and not have to worry so much, I never thought of doing it intentionally.\" Patient is adamant of denial of suicidal thoughts, plan, rumination, or planning and is convincing.    Since last visit noted PCP added Buspar 15 mg daily as " "needed, which patient reports taking only as needed, and took one night last week, then the following few days felt he was more isolated, felt more tired.      Depression: Patient complains of depression. He complains of anhedonia, depressed mood, difficulty concentrating, fatigue, feelings of worthlessness/guilt, and insomnia   Anxiety:  The patient endorses to the following symptoms of anxiety including: excessive anxiety and worry about a number of events or activities for more days than not, being easily fatigued, difficulty concentrating or mind going blank, irritability, muscle tension, and sleep disturbance which have caused impairment in important areas of daily functioning.    Q-Thnm-Vywsixsq/Recent-Self-Report    Question 11/19/2024  8:39 AM EST - Filed by Meri Perales MA   In the past month, have you wished you were dead or wished you could go to sleep and not wake up? Yes   In the past month, have you actually had any thoughts about killing yourself? No   In your lifetime, have you ever done anything, started to do anything, or prepared to do anything to end your life? No       8/9/24:    Answers submitted by the patient for this visit:  Primary Reason for Visit (Submitted on 8/2/2024)  What is the primary reason for your visit?: Other  Other (Submitted on 8/2/2024)  Please describe your symptoms.: Anxiety, depression,  ptsd sometimes causing chest pain, possible adhd.  Have you had these symptoms before?: Yes  How long have you been having these symptoms?: Greater than 2 weeks  Please list any medications you are currently taking for this condition.: Fluoxetine 80mg, An. Flonase    Patient presents today in office for a requested ADHD evaluation.  Patient noticed there were things he rarely noticed previously had subsided while taking Phentermine for weight loss.  \"If anyone reminds me for appointments or wife will remind me and say again \"I can't wait for tomorrow night\" and forgets we had plans.\"  " "Patient last appointment for therapy was on calendar on phone though phone  and had been using another phone which the calendar information did not transfer and patient had missed the appointment in .  Patient reports upon arriving to work, he would head directly to assigned area, however, while taking Phentermine he was able to be more social, felt less anxious, carry on conversations.  \"I didn't have to think for words to describe things, now I have to reach out to find words I need to use. Things I really didn't notice that badly until I was on it and I felt more myself.\"  Patient recalls while in highschool would click teeth together or tapped pencil to his head, fidgeting in seat when he felt anxious.   \"Didn't over think about bills, teenager stuff and work problems.\"     \"It's more about how I felt on phentermine than to have evaluation for ADHD, and I didn't notice I had ADHD until I took it.  I didn't take any naps, I wasn't bothered by things.\"    ADHD:   Elementary school:   Grades: unable to recall    Special classes or failures: no and no  Got in trouble: sometimes couldn't sit still, had hands smacked  Referral for ADHD testing: no  Fhx: son-diagnosed around 7-8th grade, treated with medication-Lamictal and quila chew   Presently:  Problems with attention to detail: No (denies submitting incomplete work, goes back to fix mistakes) describes as: \"while placing barn doors up in home, had to keep going back and getting aligned, while taking phentermine I didn't notice it or I didn't care if I did or didn't go over the yard again because I would have to go back and cut places I missed .\"  Problems with sustained attention: No (Difficulty remaining focused during lengthy meetings over 15-20 minutes.\"I want to go to work\".as sometimes will have meetings and patient wants to get started on his day.   Problems listening when spoken to directly: No (describes situations where he has difficulty listening " "while preoccupied in another task, if there are no obvious distraction is able to listen.) \"will hear them talking, I can't resonate what they are saying, have to ask them to repeat it. Especially if I am doing something already. 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 Putting it back together, \"at work is not as bad because I am getting paid. \"  Avoids/Dislikes/Reluctant to engage in tasks that require sustained mental effort:No thinking about putting stuff away in room, hasn't put larger fitting clothes away since weight loss, able to clean garage due to taking phentermine.  Easily distracted: Yes (noises, TV, phone, external factors)   Forgetting things: No (paying bills-does all on mobile roxanna so its not an issue, keeping appointments-with reminders has no difficulty)   Losing things: Sometimes-has a designated place for keys, sometimes doesn't place in area, mostly keys are misplaced often, wife had found keys in the garage and patient did not recall leaving them in the garage, other items denied.  Hard to organize: Sometimes (admits to difficulty keeping things in order, hasn't been able to adhere to calorie counting roxanna \"I can't keep with it for some reason.\" Denies difficulty managing sequential tasks, poor time management, nor failure to meet deadlines)   Talks a lot and cutting people off: Sometimes, not consistently \"when I get to talking but a lot of times anti-social behavior kicks in;\" and  sometimes  Drifts off during conversations: Sometimes   Difficulty with Reading: \"I'm not into reading\"  Xiii. Difficulty watching TV/Movies: No- recently doesn't " "finish You tube videos; can sit through movies      6/3/24:    Answers submitted by the patient for this visit:  Primary Reason for Visit (Submitted on 5/27/2024)  What is the primary reason for your visit?: Depression  Depression (Submitted on 5/27/2024)  Chief Complaint: Depression  Visit: follow-up  Frequency: constantly  Severity: severe  excessive worry: Yes  insomnia: No  irritability: Yes  malaise/fatigue: No  obsessions: No  hypersomnia: No  difficulty controlling mood: Yes  Medication compliance: %  Side effects: sexual problems  Aggravated by: family issues, social activities, work stress    Patient presents today in office reports he is somewhat anxious about PCP stopping phentermine for 3 months and will then restart, patient was 241 lb. On 1/23/24, and today is 225 lbs, though patient weighed self at home and was 219 lb this morning without clothes or phone, keys, as he did today.  Patient has been practicing mindfulness regarding food, most recently has stopped eating pasta and pizza, and is now avoiding soda's, drinks more water.  Patient feels his self confidence has improved and see's results. \"The Prozac has been doing great.\"  Patient didn't have 1 time to take last week when they went to [place], and started feeling panic about not being able to find a place to park, loud music worsened anxiety.   Re-weighed patient without cell phone, keys, and shoes: 223.0 lbs. Overall mood has been stable with Prozac.    Depression: Patient complains of depression. He complains of anhedonia, depressed mood, difficulty concentrating, fatigue, and feelings of worthlessness/guilt  Anxiety:  The patient endorses significant symptoms of anxiety including:  mild anxiousness, being easily fatigued, and difficulty concentrating or mind going blank which have caused impairment in important areas of daily functioning.      1/30/24:    Answers submitted by the patient for this visit:  Primary Reason for Visit " "(Submitted on 1/23/2024)  What is the primary reason for your visit?: Other  Other (Submitted on 1/23/2024)  Please describe your symptoms.: Depression, anxiety, PTSD  Have you had these symptoms before?: Yes  How long have you been having these symptoms?: Greater than 2 weeks  Please list any medications you are currently taking for this condition.: Fluoxetine 80mg  Please describe any probable cause for these symptoms. : Work, my past    Patient presents today in office, at last visit Prozac was increased from 60 to 80 mg for which patient reports has been effective, patient found another bottle of the 20 mg, and has been taking 4 of the 20 mg.  Reports less anxiety and depressive symptoms.  Denies side effects since dose increase, sleeping well.     Noted patient started Phentermine per PCP which was dispensed 1/23/24 for weight loss. Patient has decreased soda intake, patient noticing weight loss which has helped with mood improvement.  Patient was also started on Buspar 15 mg twice daily per PCP due to having 2 panic attacks over the last month, which patient has not taken.     Patient was in bed laying flat about to go to sleep and began to have SOA, feeling panic, started shaking.  Patient got out of bed, drank some water and ate a snack and sat on couch to calm down, and was able to recover, \"it lasted no more than 5-10 minutes.\"    Continues to see therapist monthly, which patient finds therapeutic and does not desire biweekly at this time.     12/7/23:    Answers submitted by the patient for this visit:  Primary Reason for Visit (Submitted on 12/5/2023)  What is the primary reason for your visit?: Other  Other (Submitted on 12/5/2023)  Please describe your symptoms.: Medicine check  Have you had these symptoms before?: Yes  How long have you been having these symptoms?: Greater than 2 weeks  Please list any medications you are currently taking for this condition.: Fluoxetine 20 MG (x3)  Please describe any " "probable cause for these symptoms. : Stress anxiety depression PTSD    Patient presents today in office, at last visit patient was reminded to take total dose of Prozac 60 mg at one time in the morning at 11 am as patient has been taking 40 mg at 11 am and 20 mg in the evening 530-6 pm. Which patient admits to adherence. Patient reports Prozac has been helpful in managing PTSD and anxiety, though admits to low energy.  Patient had felt fatigue prior to dose time changes.     Patient has been working more hours, though even while off and rested and while in [place] in 8/2023, felt didn't want to go out to [place] and wanted to stay indoors, though uncertain if \"its just me. Seems I wasn't excited to go anywhere, I was on vacation.\"      Patient no longer having chest pain, with anxiety, able to talk to people easier now.     Patient was able to see therapist since last visit.  Patient has another visit next week, attending monthly.      Patient had work issue in 2/2022 and when patient did have interactions with those individuals would experience physical symptoms of anxiety. Before the Prozac would avoid interactions with that individual, and now able to not be bothered.   In the past while taking Prozac years ago does not recall experiencing anhedonia and fatigue, though symptoms have improved, they could be better.     Depression: Patient complains of depression. He complains of anhedonia, depressed mood, difficulty concentrating, fatigue, and feelings of worthlessness/guilt  Anxiety:  The patient endorses significant symptoms of anxiety including:  anxiousness, being easily fatigued, difficulty concentrating or mind going blank, and irritability which have caused impairment in important areas of daily functioning.      8/28/23:    Answers submitted by the patient for this visit:  Primary Reason for Visit (Submitted on 8/21/2023)  What is the primary reason for your visit?: Other  Other (Submitted on 8/21/2023)  Please " "describe your symptoms.: Anxiety (causing chest pain) PTSD Depression  Have you had these symptoms before?: Yes  How long have you been having these symptoms?: Greater than 2 weeks  Please list any medications you are currently taking for this condition.: Fluoxetine 60mg  Please describe any probable cause for these symptoms. : Work, stress, sleep apnea    Patient presents today in office, at last visit Prozac was increased from 40 to 60 mg and was advised to take later in the day due to patient working 2nd shift and had reported feeling the medication was corbin off and not lasting 12 hrs. Reports improvement in mood, has been taking 40 mg of old supply at 11 am and takes 20 mg while on break at work between 530-6 pm.  Denies side effects, tolerating well.    Patient had some increased stress regarding finances as truck needed repairs. Work is better.  Feels he is able to handle stress at home better than the past.    Patient has scheduled an appointment with therapist 9/13/23.       6/20/23:    Answers submitted by the patient for this visit:  Primary Reason for Visit (Submitted on 6/13/2023)  What is the primary reason for your visit?: Other  Other (Submitted on 6/13/2023)  Please describe your symptoms.: Anxiety  depression  PTSD  Have you had these symptoms before?: Yes  How long have you been having these symptoms?: Greater than 2 weeks  Please list any medications you are currently taking for this condition.: Fluoxetine 40  Please describe any probable cause for these symptoms. : Work, childhood, stress    Patient presents today in office reports having days Prozac is not working due to symptoms of lack of focus, chest discomfort due to anxiety. Though symptoms are not as frequent as with 20 mg Prozac.  Has days of being more \"anti-social\" and good days, and will compensate low energy levels with caffeine intake.  \"My brain makes it feel like a bad day, nothing out of the ordinary making it a bad day.\"  " "Patient works 4p-12 midnight, and takes dose upon awakening at 9-10 am, and around 10-11 pm feels anxiety starts. Since starting Prozac feels needs a nap in the afternoon, though uncertain as patient started 2nd shift 10/2022 as well as Prozac vs KAILASH which is untreated.     Patient was advised to schedule appointment with therapist at last visit, however, has not heard back since leaving a message.     Depression: Patient complains of depression. He complains of anhedonia, depressed mood, difficulty concentrating, fatigue, feelings of worthlessness/guilt, and insomnia  Anxiety:  The patient endorses significant symptoms of anxiety including: excessive anxiety and worry about a number of events or activities for more days than not, restlessness or feeling keyed up, being easily fatigued, difficulty concentrating or mind going blank, irritability, and sleep disturbance which have caused impairment in important areas of daily functioning.      3/21/23:    Answers for HPI/ROS submitted by the patient on 3/14/2023  What is the primary reason for your visit?: Other  Please describe your symptoms.: Depression, anxiety,  ptsd  Have you had these symptoms before?: Yes  How long have you been having these symptoms?: Greater than 2 weeks  Please list any medications you are currently taking for this condition.: Fluoxetine 40mg  Patient presents today in office, at last visit Prozac was increased from 20 to 40 mg for which patient reports \"I am doing well. I don't think my anxiety will go away completely, but it is definitely better, I don't dwell on it.\" Patient reports overall Prozac has been very effective and is pleased with results.  Sleep has improved due to decreased anxiety, able to calm mind.      Patient had an appointment in early 1/2023 with therapy which was cancelled by provider and has not received a call to reschedule, which patient has not attempted to reschedule, though plans to do so today.      Patient has " "felt anxiety prior to going into work, though quickly subsides, and patient feels may be due to the idea of just going into work.  Overall mood has improved in regards to depression.     1/31/23:  Patient presents today in office \"I feel like the problems are still there, but I am dealing with them better, I am not stressing about it as much at home.  Started Prozac at last visit, for which patient feels has helped improve mood.  Patient explains in Nov 2022 had some financial concerns, and was able to refinance both vehicles which lowered monthly payments, and \"I felt like if I didn't have the Prozac I wouldn't have been able to do that.\"      Patient denies SI, though at times has thoughts \"if I was gone they would be happier financially, because of monthly child support.\"  Son is 16 currently. Denies feelings of harming self or others, denies rumination of thoughts or action plan.      Patient takes Prozac later in morning at 11 am-12 noon due to working 2nd shift, though at times will not go to sleep until 3 am.  Patient had difficulty with sleep prior to starting Prozac as patient has KAILASH and is unable to wear the mask.  Recently bought a mouth guard to help with snoring.  Denies gasping for air, though wife says patient has in the past. \"I do snore really loud.\"    Patient continues to see therapist, which is going well.     Depression: Patient complains of depression. He complains of anhedonia, depressed mood, fatigue, feelings of worthlessness/guilt and insomnia    Anxiety:  The patient endorses significant symptoms of anxiety including: excessive anxiety and worry about a number of events or activities for more days than not, being easily fatigued, difficulty concentrating or mind going blank, irritability and sleep disturbance which have caused impairment in important areas of daily functioning.        10/18/22:  Patient presents today in office reporting tolerating Lexapro initially, though having " "difficulty falling asleep, and approximately 1 week ago stopped taking Lexapro and noticed immediately those symptoms resolved.  As now patient is not having difficulty going to sleep.  Patient tried to take lower dose of Hydroxyzine 10 mg and continued to feel groggy the following day.     Patient reports started therapy with Elyssa and has upcoming appointment 10/25/22.     Patient reports switched shifts from 8a-4pm to 4 pm-12 midnight, which started 2 weeks ago, continues to have chest pain with anxiety while driving, sleep has improved, however, depression rates 6 our of 10, and anxiety 8or9 /10.  \"This hasn't been the best year.\"  Difficulty's with employer, finances, son, life stressors.     Patient further explains he was having difficulty sleeping while taking Lexapro at lower dose, though open to possible reason related to anxiety.   Recalls while taking Prozac also took Hydroxyzine at night which suspect feelings of grogginess, \"feeling so crappy was a combination of the two together.\"     9/13/22:  Patient presents today in office reporting  \"Effexor not working for me. Sweating a lot and feel zoned out\" and felt bloated, patient was started on 37.5 mg of Effexor at last appointment and decreased as needed Hydroxyzine from 25 to 10 mg by mouth every 6 hr as needed.  Patient reports stopped taking Effexor XR after almost 2 weeks due to adverse side effects.  Once patient stopped Effexor XR the symptoms resolved.     Patient tried lower dose of Hydroxyzine of 10 mg after stopping Effexor XR for 2 days and continued to feel tired, though not able to get to sleep, patient took off from work to help \"destress and difficulty sleeping.\" Patient has been off for approximately 2 weeks including scheduled off days.      Patient was able to schedule appointment with therapist for 9/28/22 at 2 pm.      Patient wife has been driving , although, patient did  son from school, and able to keep mind off of " "anxiety while talking to son.  School is 5-10 minutes away from patient home.     Patient does suffer from neck pain which worsens with CPAP/BiPap mask despite trying to loosen strap.  Not able to tolerate.    Patient most impairing symptom is the depression and anxiety as patient believes \"insomnia is a product of the anxiety.\" Patient complaint of inability to calm mind down, \"I don't try to think about it, it just hits me.\"       8/9/22:  INITIAL EVAL  Patient presents today in office with a history of anxiety and depression.   Patient began treatment approximately 10-12 yrs ago with PCP's, has never seen a behavioral health provider.  Patient initial demeanor presents as anxious, somewhat guarded, and flat, however, as visit progressed patient was very open with current stressors and feelings.      Depression: Patient complains of depression. He complains of anhedonia, depressed mood, difficulty concentrating, fatigue, feelings of worthlessness/guilt and negative thoughts of feeling family would be better off without him, though denies actual suicidal thoughts.  Patient admits to \"if I was gone everyone would be better off, I would never do it, I just think about it, fleeting throughout different parts of the day.\"  Patient reports while working it is easier, however, endorses to increased anxiety related to work.  \"a lot of the issues stem from work as well.\"  Denies action plan.  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     Patient further describes details as " "\"the incident in March 2022, I was putting something away, forklift leaking I was unaware, switched vehicles, found leak and 2 days went by, and while working having some trouble with moving pallets, manager began yelling in my face\", patient began to have chest pain and coughing, and went to ED.  Patient was then given some acid reflex medications and had a cardiac workup which was negative.  Patient was then started on Prozac 20 mg 4/26/22.      Current manager is different than before, has had past problems with current female supervisor in 12/2020 while helping putting cylinders away, co-worker put in wrong bins but patient name was on the work, however, patient was punished and had told union, \" she had taken me out of the computer and she refused to put me back in the computer, had arguments prior to this incident,\" patient feels retaliation from this supervisor which has weighed on patient as supervisor now giving patient the \"cold shoulder\". And has difficulty with anxiety.  Patient reports past employer at [employer] did not have any problems, however, patient feels he does not share same  interests as fellow employees.  Patient has been at [employer] for 23 yrs, \"and I still feel like an outsider.\"     Patient admits to having feelings of chest pressure and minimal sweating to palms while driving, \"anytime I get an anxious or stressful situation almost,\" patient takes Hydroxyzine 25 mg and feels in zombie state, and 3-4 hrs later will take Buspar 10 mg which causes drowsiness, denies dizziness.  Patient believes anxiety symptoms while driving began after incident earlier this year with the forklift, March 2022.    Patient admits to only taking Buspar as needed, does not need or take while off work, and on weekends takes once if working as there is a \"lighter management\" .  Patient reports he is unable to take Prozac at exact same time with Buspar and hydroxyzine due to feelings of " "\"spacing out\".   Patient taking Prozac every morning which patient states, \"it makes me a little more social.  Not so much while driving to work, it's only 2 min.from my house. 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 \"High traffic area gets my chest going and I get anxious.\"      PHQ-9 Depression Screening  PHQ-9 Total Score:   2/6/2025 19   The PHQ has not been completed during this encounter.       BEE-7    2/6/2025 16    The following portions of the patient's history were reviewed and updated as appropriate: allergies, current medications, past family history, past medical history, past social history, and past surgical history.     Past Surgical History:  Past Surgical History:   Procedure Laterality Date    COLONOSCOPY N/A 7/26/2024    Procedure: COLONOSCOPY WITH POLYPECTOMY;  Surgeon: Pranav Granger MD;  Location: [facility] ENDOSCOPY;  Service: General;  Laterality: N/A;  DIVERTICULOSIS. COLON POLYP    ENDOSCOPY N/A 7/26/2024    Procedure: ESOPHAGOGASTRODUODENOSCOPY with biopsies;  Surgeon: Pranav Granger MD;  Location: [facility] ENDOSCOPY;  Service: General;  Laterality: N/A;  NORMAL    LUMBAR DECOMPRESSION  03/09/2016    JEH  L4-5 lumbar decomp disckectomy and TLIF  sextant mtrix and stealth    LUMBAR FUSION Right 03/09/2016    Procedure: RT L4-5 LUMBAR DECOMPRESSION DISCECTOMY AND TLIF SEXTANT METRIX AND STEALTH;  Surgeon: Celestino Mackay MD;  Location: [facility] MAIN OR;  Service:     PLANTAR FASCIA RELEASE Left 02/09/2021    Procedure: LEFT FOOT ENDOSCOPIC PLANTAR FASCIOTOMY;  Surgeon: Rafy Bauer DPM;  Location: [facility] MAIN OR;  Service: Podiatry;  Laterality: Left;    SPINE SURGERY      lumbar spinal diskectomy L4/L5  L5/S1 2009    WISDOM TOOTH EXTRACTION         Problem List:  Patient Active Problem List   Diagnosis    Lumbosacral disc disease    Work related injury    Post-operative state    Decreased sensation "    Low back pain    Vitamin D deficiency    Acquired hammer toe of left foot    Cavus deformity of left foot    Hyperkeratosis    Pain in left foot    Plantar fasciitis    Dysthymia    Anxiety    Major depressive disorder with single episode, in partial remission    Seasonal allergic rhinitis due to pollen    Class 2 obesity due to excess calories without serious comorbidity with body mass index (BMI) of 36.0 to 36.9 in adult    Pruritus    Screen for colon cancer    Overweight    Long-term use of high-risk medication    Allergic rhinitis    Gastroesophageal reflux disease without esophagitis    Gastroesophageal reflux disease with esophagitis without hemorrhage    Epigastric pain    Screening for malignant neoplasm of colon    Chest pain, atypical    Diastolic dysfunction       Allergy:   No Known Allergies     Discontinued Medications:  Medications Discontinued During This Encounter   Medication Reason    busPIRone (BUSPAR) 15 MG tablet Patient Transfer                     Current Medications:   Current Outpatient Medications   Medication Sig Dispense Refill    albuterol sulfate  (90 Base) MCG/ACT inhaler Every 4 (Four) Hours.      Azelastine-Fluticasone 137-50 MCG/ACT suspension USE 1 SPRAY(S) IN EACH NOSTRIL TWICE DAILY      FLUoxetine (PROzac) 40 MG capsule TAKE 2 CAPSULES BY MOUTH EVERY MORNING 180 capsule 1    metFORMIN ER (GLUCOPHAGE-XR) 500 MG 24 hr tablet TAKE 1 TABLET BY MOUTH  TWICE DAILY 60 tablet 1    phentermine (ADIPEX-P) 37.5 MG tablet Take 1 tablet by mouth Every Morning Before Breakfast. 30 tablet 0    sildenafil (REVATIO) 20 MG tablet TAKE 1-5 TABLETS BY MOUTH 1 HOUR PRIOR TO INTERCOURSE, MUST SEEK EMERGENCY EVALUATION IF ERECTION IS MAINTAINED > 4 HOURS 60 tablet 1    busPIRone (BUSPAR) 15 MG tablet Take 1 tablet by mouth 2 (Two) Times a Day for 180 days. Indications: Anxiety Disorder 180 tablet 1     No current facility-administered medications for this visit.       Past Medical  History:  Past Medical History:   Diagnosis Date    Allergic My whole life    Started out hay fever, now just build up in nack of throat    Anxiety     Back pain     CHF (congestive heart failure) 2022    Not bad at moment, likely result of sleep apnea    Depression     Erectile dysfunction 1999    Head injury     Lumbar herniated disc     Lumbosacral disc disease     Obesity     Obstructive sleep apnea     C-PAP mask not working/ not currently treating    Panic disorder     PTSD (post-traumatic stress disorder)     Scoliosis My whole life    Spinal headache 2001    Had issues with neck pain    Work related injury 10/30/2014    reinjured 8-13-15       Past Psychiatric History:  Began Treatment: approximately 10 or more years ago with  PCP at the time  Diagnoses:Depression, Anxiety and suspected PTSD  Psychiatrist:Denies  Therapist: 2016 through [place] prior to back surgery  Admission History:Denies  Medication Trials: Xanax and Zoloft initally [id number] with -ineffective; 10-12 yrs ago placed on Prozac-ineffective previously; Effexor XR 8/9-9/13/22 sweating,zoned out,abd bloating; Buspar 10 mg as needed -drowsiness; Hydroxyzine-drowsiness with low dose of 10 mg, next day grogginess ; Lexapro-insomnia after 5 weeks, possible related to anxiety, though self stopped mid Oct.2022    Self Harm: Denies  Suicide Attempts:Denies    Substance Abuse History:   Types:Denies all, including illicit  Withdrawal Symptoms:Denies  Longest Period Sober:Not Applicable   AA: Not applicable     Social History:   Martial Status:  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, 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  House:Lives in a house   History: Denies  Access to Guns:  No    Social History     Socioeconomic History    Marital status:     Number of children: 1    Highest education level: High  school graduate   Tobacco Use    Smoking status: Never     Passive exposure: Never    Smokeless tobacco: Never   Vaping Use    Vaping status: Never Used   Substance and Sexual Activity    Alcohol use: No    Drug use: Never    Sexual activity: Not Currently     Partners: Female     Birth control/protection: None       Family History:   Suicide Attempts: Denies  Suicide Completions:Denies      Family History   Problem Relation Age of Onset    Diabetes Father     Anxiety disorder Son         Kvng    ADD / ADHD Son     Depression Son     Mental illness Son        Developmental History:   Born: [place]  Siblings:1 brother  Childhood:  physical,mental-by father  High School:Completed  College:Denies    Mental Status Exam:   Hygiene:   good  Cooperation:  Cooperative  Eye Contact:  Good  Psychomotor Behavior:  Appropriate  Affect:  Appropriate  Mood: anxious   Speech:  Normal  Thought Process:  Goal directed  Thought Content:  Mood congruent  Suicidal:  None  Homicidal:  None  Hallucinations:  None  Delusion:  None  Memory:  Intact  Orientation:  Person, Place, Time and Situation  Reliability:  good  Insight:  Good  Judgement:  Good  Impulse Control:  Good  Physical/Medical Issues:  Yes KAILASH-untreated due to claustraphobia with mask, L-Spine back pain,GERD      Review of Systems:  Review of Systems   Constitutional:  Positive for fatigue. Negative for diaphoresis.   HENT:  Negative for drooling.    Eyes:  Negative for visual disturbance.   Respiratory:  Positive for apnea. Negative for cough and shortness of breath.         KAILASH, unable to tolerate mask, snores loudly   Cardiovascular:  Negative for chest pain, palpitations and leg swelling.   Gastrointestinal:  Negative for nausea and vomiting.   Endocrine: Negative for cold intolerance and heat intolerance.   Genitourinary:  Negative for difficulty urinating.   Musculoskeletal:  Negative for joint swelling.   Allergic/Immunologic: Negative for immunocompromised state.  "  Neurological:  Negative for dizziness, seizures, speech difficulty and numbness.   Psychiatric/Behavioral:  Positive for decreased concentration. Negative for agitation, confusion, hallucinations, self-injury, sleep disturbance and suicidal ideas. The patient is nervous/anxious. The patient is not hyperactive.          Physical Exam:  Physical Exam  Psychiatric:         Attention and Perception: Attention and perception normal.         Mood and Affect: Affect normal. Mood is anxious.         Speech: Speech normal.         Behavior: Behavior normal. Behavior is cooperative.         Thought Content: Thought content normal. Thought content does not include suicidal ideation. Thought content does not include suicidal plan.         Cognition and Memory: Cognition and memory normal.         Judgment: Judgment normal.      Comments: improving         Vital Signs:   /80   Pulse 89   Ht 175.3 cm (69.02\")   Wt 91.1 kg (200 lb 12.8 oz)   BMI 29.64 kg/m²      Office notes from care team reviewed:  Office Visit with Fauzia Ballard MD (02/18/2025)     Lab Results: REVIEWED  Lab on 03/28/2025   Component Date Value Ref Range Status    25 Hydroxy, Vitamin D 03/28/2025 38.9  30.0 - 100.0 ng/ml Final    Glucose 03/28/2025 87  65 - 99 mg/dL Final    BUN 03/28/2025 8  6 - 20 mg/dL Final    Creatinine 03/28/2025 1.14  0.76 - 1.27 mg/dL Final    Sodium 03/28/2025 138  136 - 145 mmol/L Final    Potassium 03/28/2025 4.9  3.5 - 5.2 mmol/L Final    Chloride 03/28/2025 103  98 - 107 mmol/L Final    CO2 03/28/2025 27.4  22.0 - 29.0 mmol/L Final    Calcium 03/28/2025 9.8  8.6 - 10.5 mg/dL Final    Total Protein 03/28/2025 7.0  6.0 - 8.5 g/dL Final    Albumin 03/28/2025 4.3  3.5 - 5.2 g/dL Final    ALT (SGPT) 03/28/2025 27  1 - 41 U/L Final    AST (SGOT) 03/28/2025 29  1 - 40 U/L Final    Alkaline Phosphatase 03/28/2025 53  39 - 117 U/L Final    Total Bilirubin 03/28/2025 0.5  0.0 - 1.2 mg/dL Final    Globulin 03/28/2025 2.7  " gm/dL Final    A/G Ratio 03/28/2025 1.6  g/dL Final    BUN/Creatinine Ratio 03/28/2025 7.0  7.0 - 25.0 Final    Anion Gap 03/28/2025 7.6  5.0 - 15.0 mmol/L Final    eGFR 03/28/2025 79.8  >60.0 mL/min/1.73 Final    WBC 03/28/2025 4.05  3.40 - 10.80 10*3/mm3 Final    RBC 03/28/2025 4.81  4.14 - 5.80 10*6/mm3 Final    Hemoglobin 03/28/2025 15.1  13.0 - 17.7 g/dL Final    Hematocrit 03/28/2025 46.4  37.5 - 51.0 % Final    MCV 03/28/2025 96.5  79.0 - 97.0 fL Final    MCH 03/28/2025 31.4  26.6 - 33.0 pg Final    MCHC 03/28/2025 32.5  31.5 - 35.7 g/dL Final    RDW 03/28/2025 12.5  12.3 - 15.4 % Final    RDW-SD 03/28/2025 44.8  37.0 - 54.0 fl Final    MPV 03/28/2025 10.8  6.0 - 12.0 fL Final    Platelets 03/28/2025 217  140 - 450 10*3/mm3 Final    Total Cholesterol 03/28/2025 156  0 - 200 mg/dL Final    Triglycerides 03/28/2025 57  0 - 150 mg/dL Final    HDL Cholesterol 03/28/2025 66 (H)  40 - 60 mg/dL Final    LDL Cholesterol  03/28/2025 78  0 - 100 mg/dL Final    VLDL Cholesterol 03/28/2025 12  5 - 40 mg/dL Final    LDL/HDL Ratio 03/28/2025 1.19   Final       EKG Results:  No orders to display     EKG dated 1/23/24, NSR, QT-367, Qtc-437, HR 85     Imaging Results:  No Images in the past 120 days found..      Assessment & Plan   Diagnoses and all orders for this visit:    1. Generalized anxiety disorder (Primary)  -     busPIRone (BUSPAR) 15 MG tablet; Take 1 tablet by mouth 2 (Two) Times a Day for 180 days. Indications: Anxiety Disorder  Dispense: 180 tablet; Refill: 1    2. Mild episode of recurrent major depressive disorder  -     busPIRone (BUSPAR) 15 MG tablet; Take 1 tablet by mouth 2 (Two) Times a Day for 180 days. Indications: Anxiety Disorder  Dispense: 180 tablet; Refill: 1    3. Stress at work  -     busPIRone (BUSPAR) 15 MG tablet; Take 1 tablet by mouth 2 (Two) Times a Day for 180 days. Indications: Anxiety Disorder  Dispense: 180 tablet; Refill: 1    4. Medication management    5. Medication care plan  discussed with patient    6. Medication dose increased          Visit Diagnoses:    ICD-10-CM ICD-9-CM   1. Generalized anxiety disorder  F41.1 300.02   2. Mild episode of recurrent major depressive disorder  F33.0 296.31   3. Stress at work  Z56.6 V62.1   4. Medication management  Z79.899 V58.69   5. Medication care plan discussed with patient  Z71.89 V65.49   6. Medication dose increased  Z79.899 V58.69         PLAN:  Safety: No acute safety concerns  Therapy: Currently Seeing a Therapist SHIRLEY Leslie  Risk Assessment: Risk of self-harm acutely is moderate  Risk factors include anxiety disorder, mood disorder,fleeting negative thoughts, and recent psychosocial stressors (pandemic). Protective factors include no family history, denies access to guns/weapons, no present SI, no history of suicide attempts or self-harm in the past, minimal AODA, healthcare seeking, future orientation, willingness to engage in care.  Risk of self-harm chronically is also moderate, but could be further elevated in the event of treatment noncompliance and/or AODA.  Meds:  -Continue Prozac (Fluoxetine) 80 mg by mouth daily in the morning to target anxiety and depression.     -INCREASE  Buspar FROM REPORTED DOSE OF 15 mg daily every morning TO 15 mg by mouth twice daily ROUTINELY to target anxiety. Absorption is affected by food, so administration with or without food should be consistent. Risks, benefits, alternatives discussed with patient including nausea, GI upset, dizziness, mild sedation, and falls risk.  After discussion of these risks and benefits, the patient voiced understanding and agreed to proceed. New prescription sent due to transfer of care to psychiatry. New prescription sent to pharmacy today to ensure all future prescriptions are filled by this provider, as psychiatry is managing all psychotropics. With note requesting pharmacy remove any prior prescriptions for this medication fromprofile due to risk of  patient receiving inaccurate prescription as patient uses an outside pharmacy.    5.  Labs:  N/a  6.  Patient instructed to request refills when appropriate, when down to 5-7 days remaining via pharmacy or via MyChart.       Symptoms of anxiety, depression are under good control with current medication regimen.  However, continues to have stress at work, despite changing back to 2nd shift, patient has been tolerating 15 mg of Buspar daily though stopped taking the 2nd dose, which will be added back today as a higher dose.   The plan was discussed with the patient. The patient was given time to ask questions and these questions were answered. At the conclusion of their visit they had no additional questions or concerns and all questions were answered to their satisfaction.   Patient was given instructions and counseling regarding condition and for health maintenance advice. Please see specific information pulled into the AVS if appropriate.    Patient to contact provider if symptoms worsen or fail to improve.      2/11/25:  -Continue Prozac (Fluoxetine) 80 mg by mouth daily in the morning to target anxiety and depression.   -Continue Buspar 7.5 mg by mouth twice daily ROUTINELY to target anxiety. Absorption is affected by food, so administration with or without food should be consistent.  Patient instructed break the 15 mg tab in half and START taking at 11 am with lunch and continue dose at 5pm with dinner. NR needed.     Symptoms of anxiety, depression are under good control with current medication regimen.    The plan was discussed with the patient. The patient was given time to ask questions and these questions were answered. At the conclusion of their visit they had no additional questions or concerns and all questions were answered to their satisfaction.   Patient was given instructions and counseling regarding condition and for health maintenance advice. Please see specific information pulled into the AVS if  appropriate.    Patient to contact provider if symptoms worsen or fail to improve.        11/19/24:  -Continue Prozac (Fluoxetine) 80 mg by mouth daily in the morning to target anxiety and depression.     -CHANGE Buspar FROM 15 mg daily as needed TO 7.5 mg by mouth twice daily ROUTINELY to target anxiety. Absorption is affected by food, so administration with or without food should be consistent. Risks, benefits, alternatives discussed with patient including nausea, GI upset, dizziness, mild sedation, and falls risk.  After discussion of these risks and benefits, the patient voiced understanding and agreed to proceed. Patient instructed break the 15 mg tab in half, if unable to tolerate due to drowsiness or dizziness, please call provider and will send in the 5 mg tabs. As patient is uncertain of shape and scoring of current 15 mg tablet.  UPDATED ORDER AS A NO PRINT, as patient has adequate supply.      Symptoms of anxiety, depression are under fair to good control with current medication regimen. Discussed option to try an alternative medication if current medication regimen does not improve symptoms.  However, there is a possibility the episode patient endured last week was situational with work setting, therefore, will switch the Buspar to routine at lower dose, and have patient follow up in 7 weeks and keep the previously scheduled appointment for Feb. At this time. The plan was discussed with the patient. The patient was given time to ask questions and these questions were answered. At the conclusion of their visit they had no additional questions or concerns and all questions were answered to their satisfaction.   Patient was given instructions and counseling regarding condition and for health maintenance advice. Please see specific information pulled into the AVS if appropriate.    Patient to contact provider if symptoms worsen or fail to improve.        8/9/24:   -Therapy: Currently Seeing a Therapist  Elyssa Curtis [facility],  seeing monthly.  -Continue Prozac (Fluoxetine) 80 mg by mouth daily in the morning to target anxiety and depression    Patient does not meet criteria for ADHD.  Symptoms described are related to underlying social anxiety and generalized anxiety.  Patient described while taking Phentermine it helped his mood and wished to discuss the effectiveness and start a stimulant for ADHD he self presumed to have based on feelings while taking stimulant medication.  Explained to patient stimulant medications are utilized for ADHD with the exception of Phentermine for weight loss, if patient wished to have formal testing he may seek, however, based on evaluation today and past visits with patient, ADHD was not a differential diagnosis. Discussed options for non-stimulant medications such as Atomoxetine or Wellbutrin XL, however, both are not recommended with coadministration of Prozac.   The plan was discussed with the patient. The patient was given time to ask questions and these questions were answered. At the conclusion of their visit they had no additional questions or concerns and all questions were answered to their satisfaction.   Patient was given instructions and counseling regarding condition and for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

## 2025-05-23 NOTE — PATIENT INSTRUCTIONS
"Should you want to get in touch with your provider, CHINO Mcclain, please contact MY Medical Assistant, Meri, directly at [phone].  Recommend saving Meri's direct number in phone as this is the PREFERRED & EASIEST way to get in contact with your provider.  Please leave a voice mail if you do not get an answer and she will return your call within 24 hrs. You will NOT be able to contact provider on [employer], as Behavioral Health Providers are restricted. YOU MUST CALL [phone]  If you need to speak with the on call provider after hours or on weekends, please Contact the [facility] ([phone]) and staff will be able to page the provider on call directly.     SPECIFIC RECOMMENDATIONS:     1.      Medications discussed at this encounter:                   -  Renewed prescription for Buspar 15 mg TO TAKE TWICE DAILY    2.      Psychotherapy recommendations: Continue with current therapist.      3.     Return to clinic:4 months, Thurs 9/18/25 at 1140 am    Please arrive at least 15 minutes before your scheduled appointment time to complete check in process.      IF you are scheduled for a [employer] VIDEO visit, YOU MUST COMPLETE THE \"E-CHECK IN\" PROCESS PRIOR TO BEGINNING THE VISIT, You may still complete the E-Check in for in office visits prior to appointment, you will receive multiple text/email reminders which will direct you further if needed.            "

## 2025-05-29 DIAGNOSIS — Z71.3 WEIGHT LOSS COUNSELING, ENCOUNTER FOR: ICD-10-CM

## 2025-05-29 DIAGNOSIS — E66.811 CLASS 1 OBESITY DUE TO EXCESS CALORIES WITHOUT SERIOUS COMORBIDITY WITH BODY MASS INDEX (BMI) OF 31.0 TO 31.9 IN ADULT: ICD-10-CM

## 2025-05-29 DIAGNOSIS — E66.09 CLASS 1 OBESITY DUE TO EXCESS CALORIES WITHOUT SERIOUS COMORBIDITY WITH BODY MASS INDEX (BMI) OF 31.0 TO 31.9 IN ADULT: ICD-10-CM

## 2025-05-29 RX ORDER — METFORMIN HYDROCHLORIDE 500 MG/1
500 TABLET, EXTENDED RELEASE ORAL EVERY 12 HOURS SCHEDULED
Qty: 60 TABLET | Refills: 1 | Status: SHIPPED | OUTPATIENT
Start: 2025-05-29

## 2025-05-31 DIAGNOSIS — F41.1 GENERALIZED ANXIETY DISORDER: ICD-10-CM

## 2025-05-31 DIAGNOSIS — F33.0 MILD EPISODE OF RECURRENT MAJOR DEPRESSIVE DISORDER: ICD-10-CM

## 2025-06-02 RX ORDER — FLUOXETINE HYDROCHLORIDE 40 MG/1
80 CAPSULE ORAL EVERY MORNING
Qty: 180 CAPSULE | Refills: 1 | Status: SHIPPED | OUTPATIENT
Start: 2025-06-02

## 2025-06-02 NOTE — TELEPHONE ENCOUNTER
REFILL REQUEST:     FLUoxetine (PROzac) 40 MG capsule (12/04/2024)     F/UP- 09/18/2025.  LOV: 05/23/2025.

## 2025-06-23 ENCOUNTER — OFFICE VISIT (OUTPATIENT)
Dept: FAMILY MEDICINE CLINIC | Age: 50
End: 2025-06-23
Payer: COMMERCIAL

## 2025-06-23 VITALS
BODY MASS INDEX: 29.33 KG/M2 | WEIGHT: 198 LBS | DIASTOLIC BLOOD PRESSURE: 70 MMHG | OXYGEN SATURATION: 98 % | SYSTOLIC BLOOD PRESSURE: 111 MMHG | HEIGHT: 69 IN | HEART RATE: 88 BPM | TEMPERATURE: 97.7 F

## 2025-06-23 DIAGNOSIS — N52.9 ERECTILE DYSFUNCTION, UNSPECIFIED ERECTILE DYSFUNCTION TYPE: ICD-10-CM

## 2025-06-23 DIAGNOSIS — K21.9 GASTROESOPHAGEAL REFLUX DISEASE WITHOUT ESOPHAGITIS: ICD-10-CM

## 2025-06-23 DIAGNOSIS — E66.3 OVERWEIGHT (BMI 25.0-29.9): Primary | ICD-10-CM

## 2025-06-23 DIAGNOSIS — Z71.3 WEIGHT LOSS COUNSELING, ENCOUNTER FOR: ICD-10-CM

## 2025-06-23 DIAGNOSIS — F32.A ANXIETY AND DEPRESSION: ICD-10-CM

## 2025-06-23 DIAGNOSIS — F41.9 ANXIETY AND DEPRESSION: ICD-10-CM

## 2025-06-23 PROCEDURE — 99214 OFFICE O/P EST MOD 30 MIN: CPT | Performed by: FAMILY MEDICINE

## 2025-06-23 RX ORDER — SILDENAFIL CITRATE 20 MG/1
TABLET ORAL
Qty: 60 TABLET | Refills: 1 | Status: SHIPPED | OUTPATIENT
Start: 2025-06-23

## 2025-06-23 RX ORDER — PHENTERMINE HYDROCHLORIDE 37.5 MG/1
37.5 TABLET ORAL
Qty: 30 TABLET | Refills: 0 | Status: SHIPPED | OUTPATIENT
Start: 2025-06-23

## 2025-06-23 NOTE — ASSESSMENT & PLAN NOTE
Stable and well-controlled without medication.  He is to avoid spicy and acidic food in his diet

## 2025-06-23 NOTE — PROGRESS NOTES
Pranav Calderon presents to [facility].    Chief Complaint:    Subjective     History of Present Illness:  Weight Loss  Pertinent negative symptoms include no abdominal pain, no chest pain, no chills, no congestion, no cough, no fatigue, no fever, no nausea, no rash, no sore throat and no vomiting.         Pranav is following up for his weight loss program.  He is now in the overweight category.  He has done extremely well.  He is lost 8 more pounds since last time I saw him.  He is working hard on daily exercise and eating a lot more healthy.  He says his biggest problem is his appetite and portion control.  BMI is 29.24 today.  He has been on phentermine in the past and would like to restart this medication.  Is been 4 months since our last follow-up and phentermine prescription.  He had no issues with heart palpitations, insomnia, or dry mouth.    He presents with chronic vit D deficiency and not on vitamin D supplementation, however last vitamin D labs showed he was in normal range    He presents with ED and is currently stable on Viagra and tolerating med well.  He needs medication refills today history of negative CT calcium cardiac scan.    He has sleep apnea and is on CPAP and tolerating well    He presents for follow-up for his chronic anxiety and depression.  He sees [facility] Macario psychiatry and a therapist Elyssa Dillard.  He is on Prozac and BuSpar and tolerates medication well.  He feels like he is functional and stable at this time with no new or acute symptoms.  He typically sees his therapist twice a month and Ashia Macario once every 3 months although he does get flareups 1-2 times a month with intermittent feelings of depression and sadness.  He denies any symptoms of HI/SI.  He is sleeping well at night        Result Review   The following data was reviewed by Fauzia Ballard MD on 06/23/2025.  Lab Results   Component Value Date    WBC 4.05 03/28/2025    HGB  "15.1 03/28/2025    HCT 46.4 03/28/2025    MCV 96.5 03/28/2025     03/28/2025     Lab Results   Component Value Date    GLUCOSE 87 03/28/2025    BUN 8 03/28/2025    CREATININE 1.14 03/28/2025     03/28/2025    K 4.9 03/28/2025     03/28/2025    CALCIUM 9.8 03/28/2025    PROTEINTOT 7.0 03/28/2025    ALBUMIN 4.3 03/28/2025    ALT 27 03/28/2025    AST 29 03/28/2025    ALKPHOS 53 03/28/2025    BILITOT 0.5 03/28/2025    GLOB 2.7 03/28/2025    AGRATIO 1.6 03/28/2025    BCR 7.0 03/28/2025    ANIONGAP 7.6 03/28/2025    EGFR 79.8 03/28/2025     Lab Results   Component Value Date    CHOL 156 03/28/2025    CHLPL 172 08/05/2020    TRIG 57 03/28/2025    HDL 66 (H) 03/28/2025    LDL 78 03/28/2025     Lab Results   Component Value Date    TSH 1.870 07/25/2023     Lab Results   Component Value Date    HGBA1C 5.50 07/25/2023     Lab Results   Component Value Date    PSA 0.563 08/15/2024    PSA 0.483 07/25/2023     No results found for: \"IRON\"   Lab Results   Component Value Date    GDWO48WR 38.9 03/28/2025               Assessment and Plan:   Assessment & Plan  Weight loss counseling, encounter for  It has been 4 months since I last saw him.  Will go ahead and restart his phentermine.  He is doing extremely well on his own with healthy diet and weight loss and daily exercise.  He tolerates meds well       Erectile dysfunction, unspecified erectile dysfunction type  Stable on generic Viagra and he tolerates well.  I did advise him of that if feels dizzy or lightheaded to check his blood pressure because it can lower his blood pressure       Overweight (BMI 25.0-29.9)  Patient's (Body mass index is 29.24 kg/m².) indicates that they are overweight with health conditions that include none . Weight is improving with lifestyle modifications. BMI is above average; BMI management plan is completed. We discussed portion control and increasing exercise.          Gastroesophageal reflux disease without esophagitis  Stable and " "well-controlled without medication.  He is to avoid spicy and acidic food in his diet       Anxiety and depression  No acute issues.  Depression is stable well-controlled on Prozac with BuSpar and he tolerates meds well.  Denies HI/SI.  Sleeping well at night                    Objective     Medications:  Current Outpatient Medications   Medication Instructions    Azelastine-Fluticasone 137-50 MCG/ACT suspension USE 1 SPRAY(S) IN EACH NOSTRIL TWICE DAILY    busPIRone (BUSPAR) 15 mg, Oral, 2 Times Daily    FLUoxetine (PROZAC) 80 mg, Oral, Every Morning    metFORMIN ER (GLUCOPHAGE-XR) 500 mg, Oral, Every 12 Hours Scheduled    phentermine (ADIPEX-P) 37.5 mg, Oral, Every Morning Before Breakfast    sildenafil (REVATIO) 20 MG tablet TAKE 1-5 TABLETS BY MOUTH 1 HOUR PRIOR TO INTERCOURSE, MUST SEEK EMERGENCY EVALUATION IF ERECTION IS MAINTAINED > 4 HOURS        Vital Signs:   /70 (BP Location: Left arm, Patient Position: Sitting, Cuff Size: Adult)   Pulse 88   Temp 97.7 °F (36.5 °C) (Temporal)   Ht 175.3 cm (69\")   Wt 89.8 kg (198 lb)   SpO2 98%   BMI 29.24 kg/m²           BP Readings from Last 3 Encounters:   06/23/25 111/70   05/23/25 106/80   02/18/25 123/80      Wt Readings from Last 3 Encounters:   06/23/25 89.8 kg (198 lb)   05/23/25 91.1 kg (200 lb 12.8 oz)   02/18/25 93.4 kg (206 lb)        Physical Exam:  Physical Exam  Vitals and nursing note reviewed.   Constitutional:       General: He is not in acute distress.     Appearance: Normal appearance. He is not ill-appearing, toxic-appearing or diaphoretic.   HENT:      Head: Normocephalic and atraumatic.      Right Ear: Tympanic membrane, ear canal and external ear normal.      Left Ear: Tympanic membrane, ear canal and external ear normal.      Nose: No congestion or rhinorrhea.      Mouth/Throat:      Mouth: Mucous membranes are moist.      Pharynx: Oropharynx is clear. No oropharyngeal exudate or posterior oropharyngeal erythema.   Eyes:      " Extraocular Movements: Extraocular movements intact.      Conjunctiva/sclera: Conjunctivae normal.      Pupils: Pupils are equal, round, and reactive to light.   Cardiovascular:      Rate and Rhythm: Normal rate and regular rhythm.      Heart sounds: Normal heart sounds.   Pulmonary:      Effort: Pulmonary effort is normal.      Breath sounds: Normal breath sounds. No wheezing, rhonchi or rales.   Abdominal:      General: Abdomen is flat.      Palpations: Abdomen is soft. There is no mass.      Tenderness: There is no abdominal tenderness.      Hernia: No hernia is present.   Musculoskeletal:      Cervical back: Neck supple. No rigidity.      Right lower leg: No edema.      Left lower leg: No edema.   Lymphadenopathy:      Cervical: No cervical adenopathy.   Skin:     General: Skin is warm and dry.   Neurological:      General: No focal deficit present.      Mental Status: He is alert and oriented to person, place, and time. Mental status is at baseline.   Psychiatric:         Mood and Affect: Mood normal.         Behavior: Behavior normal.         Thought Content: Thought content normal.         Judgment: Judgment normal.           Review of Systems:  Review of Systems   Constitutional:  Negative for chills, fatigue and fever.   HENT:  Negative for congestion, ear discharge and sore throat.    Respiratory:  Negative for cough, shortness of breath and wheezing.    Cardiovascular:  Negative for chest pain, palpitations and leg swelling.   Gastrointestinal:  Negative for abdominal pain, constipation, diarrhea, nausea, vomiting and GERD.   Genitourinary:  Negative for flank pain.   Skin:  Negative for rash.   Neurological:  Negative for dizziness and headache.   Psychiatric/Behavioral:  Negative for sleep disturbance, suicidal ideas and depressed mood. The patient is not nervous/anxious.               Follow Up   Return in about 1 month (around 7/23/2025) for Recheck.    Part of this note may be an electronic  transcription/translation of spoken language to printed   text using the Dragon Dictation System.              Health Maintenance   Topic Date Due    COVID-19 Vaccine (4 - 2024-25 season) 09/01/2024    Pneumococcal Vaccine 0-49 (1 of 2 - PCV) 02/18/2026 (Originally 6/1/1996)    INFLUENZA VACCINE  07/01/2025    ANNUAL PHYSICAL  08/15/2025    COLORECTAL CANCER SCREENING  07/26/2029    TDAP/TD VACCINES (2 - Td or Tdap) 07/25/2033    HEPATITIS C SCREENING  Completed          Medical History:  Medications Discontinued During This Encounter   Medication Reason    albuterol sulfate  (90 Base) MCG/ACT inhaler *Therapy completed    phentermine (ADIPEX-P) 37.5 MG tablet Reorder    sildenafil (REVATIO) 20 MG tablet Reorder      Past Medical History:    ADHD (attention deficit hyperactivity disorder)    Unsure when first noticed    Allergic    Started out hay fever, now just build up in nack of throat    Anxiety    Back pain    CHF (congestive heart failure)    Not bad at moment, likely result of sleep apnea    Colon polyp    Had one. Was removed    Depression    Erectile dysfunction    GERD (gastroesophageal reflux disease)    My symptoms could be caused by my CHF cause upper GI scope was normal    Head injury    Hernia    Estimate    History of medical problems    Developed the shingles and have been off work since the 19th.    Lumbar herniated disc    Lumbosacral disc disease    Obesity    Obstructive sleep apnea    C-PAP mask not working/ not currently treating    Panic disorder    PTSD (post-traumatic stress disorder)    Scoliosis    Spinal headache    Had issues with neck pain    Work related injury    reinjured 8-13-15     Past Surgical History:    COLONOSCOPY    Procedure: COLONOSCOPY WITH POLYPECTOMY;  Surgeon: Pranav Granger MD;  Location: [facility];  Service: General;  Laterality: N/A;  DIVERTICULOSIS. COLON POLYP    ENDOSCOPY    Procedure: ESOPHAGOGASTRODUODENOSCOPY with biopsies;  Surgeon:  Pranav Granger MD;  Location:  [facility];  Service: General;  Laterality: N/A;  NORMAL    LUMBAR DECOMPRESSION    JEH  L4-5 lumbar decomp disckectomy and TLIF  sextant mtrix and stealth    LUMBAR FUSION    Procedure: RT L4-5 LUMBAR DECOMPRESSION DISCECTOMY AND TLIF SEXTANT METRIX AND STEALTH;  Surgeon: Celestino Mackay MD;  Location:  [facility];  Service:     PLANTAR FASCIA RELEASE    Procedure: LEFT FOOT ENDOSCOPIC PLANTAR FASCIOTOMY;  Surgeon: Rafy Bauer DPM;  Location: [facility];  Service: Podiatry;  Laterality: Left;    SPINE SURGERY    lumbar spinal diskectomy L4/L5  L5/S1 2009    WISDOM TOOTH EXTRACTION      Family History   Problem Relation Age of Onset    Diabetes Father     Anxiety disorder Son         Kvng    ADD / ADHD Son     Depression Son     Mental illness Son     Learning disabilities Son         Adhd    Kidney disease Mother         Was on home dialysis    Other Mother         Had the shingles which spread to her brain and developed encephalitis     Social History     Tobacco Use    Smoking status: Never     Passive exposure: Never    Smokeless tobacco: Never   Substance Use Topics    Alcohol use: No       Health Maintenance Due   Topic Date Due    COVID-19 Vaccine (4 - 2024-25 season) 09/01/2024        Immunization History   Administered Date(s) Administered    COVID-19 (PFIZER) 12YRS+ (COMIRNATY) 01/23/2024    COVID-19 (PFIZER) Purple Cap Monovalent 04/02/2021, 04/28/2021    Flu Vaccine Intradermal Quad 18-64YR 10/19/2021    Influenza Injectable Mdck Pf Quad 10/19/2021, 10/25/2022, 09/28/2023    Influenza, Unspecified 10/25/2022    Tdap 07/25/2023       No Known Allergies

## 2025-07-24 ENCOUNTER — OFFICE VISIT (OUTPATIENT)
Dept: FAMILY MEDICINE CLINIC | Age: 50
End: 2025-07-24
Payer: COMMERCIAL

## 2025-07-24 VITALS
HEIGHT: 69 IN | WEIGHT: 191.6 LBS | OXYGEN SATURATION: 99 % | TEMPERATURE: 97.5 F | HEART RATE: 78 BPM | BODY MASS INDEX: 28.38 KG/M2 | SYSTOLIC BLOOD PRESSURE: 112 MMHG | DIASTOLIC BLOOD PRESSURE: 76 MMHG

## 2025-07-24 DIAGNOSIS — Z71.3 WEIGHT LOSS COUNSELING, ENCOUNTER FOR: Primary | ICD-10-CM

## 2025-07-24 DIAGNOSIS — F32.A ANXIETY AND DEPRESSION: ICD-10-CM

## 2025-07-24 DIAGNOSIS — K21.9 GASTROESOPHAGEAL REFLUX DISEASE WITHOUT ESOPHAGITIS: ICD-10-CM

## 2025-07-24 DIAGNOSIS — E66.3 OVERWEIGHT (BMI 25.0-29.9): ICD-10-CM

## 2025-07-24 DIAGNOSIS — E55.9 VITAMIN D DEFICIENCY: ICD-10-CM

## 2025-07-24 DIAGNOSIS — N52.9 ERECTILE DYSFUNCTION, UNSPECIFIED ERECTILE DYSFUNCTION TYPE: ICD-10-CM

## 2025-07-24 DIAGNOSIS — F41.9 ANXIETY AND DEPRESSION: ICD-10-CM

## 2025-07-24 PROCEDURE — 99214 OFFICE O/P EST MOD 30 MIN: CPT | Performed by: FAMILY MEDICINE

## 2025-07-24 RX ORDER — PHENTERMINE HYDROCHLORIDE 37.5 MG/1
37.5 TABLET ORAL
Qty: 30 TABLET | Refills: 0 | Status: SHIPPED | OUTPATIENT
Start: 2025-07-24

## 2025-07-24 RX ORDER — MULTIVIT-MIN/IRON/FOLIC ACID/K 18-600-40
2000 CAPSULE ORAL DAILY
Qty: 90 CAPSULE | Refills: 1 | Status: SHIPPED | OUTPATIENT
Start: 2025-07-24

## 2025-07-24 NOTE — PROGRESS NOTES
Pranav Calderon presents to [facility].    Chief Complaint: Follow-up for weight loss medicine    Subjective     History of Present Illness:    Pranav is following up for his weight loss program and he continues to do extremely well.  This is months to for his phentermine refills.  He has lost 7 pounds in the past month.  He is eating healthy and exercising daily.  He is tolerating the medication without any side effects.  His BMI is down from 29.24-28.28.  I have advised him that this will be his last phentermine refill because he does not need the medication with a BMI less than 28.  He is due for an annual physical and will follow-up in 2 months for this.  Will get his lab work at that time.   He denies heart palpitations, insomnia, or dry mouth.    He presents with chronic vit D deficiency and is back on vitamin D supplementation 2000 units daily.  Will check labs in 2 months    He presents with chronic issues with ED and is doing well on Viagra and tolerating med well.  He needs medication refills today.  He is seeing cardiology and has a history of negative CT calcium cardiac scan.    He presents with chronic anxiety and depression.  He is managed by [facility] Macario psychiatry and goes to therapy with Elyssa Dillard.  He is on Prozac and BuSpar and tolerates medication well.  He feels like he is functional and stable at this time with no new or acute symptoms.  He typically sees his therapist twice a month and Ashia Macario once every 3 months although he does get flareups 1-2 times a month with intermittent feelings of depression and sadness.  He denies any symptoms of HI/SI.  He is sleeping well at night.  He has history of sleep apnea and is no longer using his CPAP and  no longer has sleep apnea symptoms which most likely have reversed because of his weight loss        Result Review   The following data was reviewed by Fauzia Ballard MD on 06/23/2025.  Lab Results  "  Component Value Date    WBC 4.05 03/28/2025    HGB 15.1 03/28/2025    HCT 46.4 03/28/2025    MCV 96.5 03/28/2025     03/28/2025     Lab Results   Component Value Date    GLUCOSE 87 03/28/2025    BUN 8 03/28/2025    CREATININE 1.14 03/28/2025     03/28/2025    K 4.9 03/28/2025     03/28/2025    CALCIUM 9.8 03/28/2025    PROTEINTOT 7.0 03/28/2025    ALBUMIN 4.3 03/28/2025    ALT 27 03/28/2025    AST 29 03/28/2025    ALKPHOS 53 03/28/2025    BILITOT 0.5 03/28/2025    GLOB 2.7 03/28/2025    AGRATIO 1.6 03/28/2025    BCR 7.0 03/28/2025    ANIONGAP 7.6 03/28/2025    EGFR 79.8 03/28/2025     Lab Results   Component Value Date    CHOL 156 03/28/2025    CHLPL 172 08/05/2020    TRIG 57 03/28/2025    HDL 66 (H) 03/28/2025    LDL 78 03/28/2025     Lab Results   Component Value Date    TSH 1.870 07/25/2023     Lab Results   Component Value Date    HGBA1C 5.50 07/25/2023     Lab Results   Component Value Date    PSA 0.563 08/15/2024    PSA 0.483 07/25/2023     No results found for: \"IRON\"   Lab Results   Component Value Date    QEQE85NU 38.9 03/28/2025               Assessment and Plan:   Assessment & Plan  Weight loss counseling, encounter for  Will refill phentermine for 1 last month.  I suspect he is BMI will be less than 28 after this treatment.  He is to continue healthy diet and daily exercise.  He tolerates med well  Orders:    phentermine (ADIPEX-P) 37.5 MG tablet; Take 1 tablet by mouth Every Morning Before Breakfast.    Erectile dysfunction, unspecified erectile dysfunction type  Stable on Viagra.  No changes needed to current meds or treatment plan       Overweight (BMI 25.0-29.9)  Patient's (Body mass index is 28.28 kg/m².) indicates that they are overweight with health conditions that include obstructive sleep apnea . Weight is improving with treatment. BMI is above average; BMI management plan is completed. We discussed portion control and increasing exercise.          Gastroesophageal reflux " "disease without esophagitis  Improved with weight loss, not currently on medication and no acute issues.  He is to avoid spicy acidic food in his diet.       Vitamin D deficiency  Stable on vitamin D supplementation.  No changes to current meds or treatment plan  Anxiety and depression   overall as anxiety depression is stable and well-controlled and he is to follow-up with psychiatry and with his therapist as directed.  No changes in current meds or treatment plan.  Denies HI/SI.  Sleeping well at night                    Objective     Medications:  Current Outpatient Medications   Medication Instructions    Azelastine-Fluticasone 137-50 MCG/ACT suspension USE 1 SPRAY(S) IN EACH NOSTRIL TWICE DAILY    busPIRone (BUSPAR) 15 mg, Oral, 2 Times Daily    FLUoxetine (PROZAC) 80 mg, Oral, Every Morning    metFORMIN ER (GLUCOPHAGE-XR) 500 mg, Oral, Every 12 Hours Scheduled    phentermine (ADIPEX-P) 37.5 mg, Oral, Every Morning Before Breakfast    sildenafil (REVATIO) 20 MG tablet TAKE 1-5 TABLETS BY MOUTH 1 HOUR PRIOR TO INTERCOURSE, MUST SEEK EMERGENCY EVALUATION IF ERECTION IS MAINTAINED > 4 HOURS    Vitamin D 2,000 Units, Oral, Daily        Vital Signs:   /76 (BP Location: Right arm, Patient Position: Sitting, Cuff Size: Adult)   Pulse 78   Temp 97.5 °F (36.4 °C) (Temporal)   Ht 175.3 cm (69.02\")   Wt 86.9 kg (191 lb 9.6 oz)   SpO2 99%   BMI 28.28 kg/m²           BP Readings from Last 3 Encounters:   07/24/25 112/76   06/23/25 111/70   05/23/25 106/80      Wt Readings from Last 3 Encounters:   07/24/25 86.9 kg (191 lb 9.6 oz)   06/23/25 89.8 kg (198 lb)   05/23/25 91.1 kg (200 lb 12.8 oz)        Physical Exam:  Physical Exam  Vitals and nursing note reviewed.   Constitutional:       General: He is not in acute distress.     Appearance: Normal appearance. He is not ill-appearing, toxic-appearing or diaphoretic.   HENT:      Head: Normocephalic and atraumatic.      Right Ear: Tympanic membrane, ear canal and " external ear normal.      Left Ear: Tympanic membrane, ear canal and external ear normal.      Nose: No congestion or rhinorrhea.      Mouth/Throat:      Mouth: Mucous membranes are moist.      Pharynx: Oropharynx is clear. No oropharyngeal exudate or posterior oropharyngeal erythema.   Eyes:      Extraocular Movements: Extraocular movements intact.      Conjunctiva/sclera: Conjunctivae normal.      Pupils: Pupils are equal, round, and reactive to light.   Cardiovascular:      Rate and Rhythm: Normal rate and regular rhythm.      Heart sounds: Normal heart sounds.   Pulmonary:      Effort: Pulmonary effort is normal.      Breath sounds: Normal breath sounds. No wheezing, rhonchi or rales.   Abdominal:      General: Abdomen is flat.      Palpations: Abdomen is soft. There is no mass.      Tenderness: There is no abdominal tenderness.      Hernia: No hernia is present.   Musculoskeletal:      Cervical back: Neck supple. No rigidity.      Right lower leg: No edema.      Left lower leg: No edema.   Lymphadenopathy:      Cervical: No cervical adenopathy.   Skin:     General: Skin is warm and dry.   Neurological:      General: No focal deficit present.      Mental Status: He is alert and oriented to person, place, and time. Mental status is at baseline.      Motor: No weakness.      Gait: Gait normal.   Psychiatric:         Mood and Affect: Mood normal.         Behavior: Behavior normal.         Thought Content: Thought content normal.         Judgment: Judgment normal.           Review of Systems:  Review of Systems   Constitutional:  Negative for chills, fatigue and fever.   HENT:  Negative for congestion, ear discharge and sore throat.    Respiratory:  Negative for cough, shortness of breath and wheezing.    Cardiovascular:  Negative for chest pain, palpitations and leg swelling.   Gastrointestinal:  Negative for abdominal pain, constipation, diarrhea, nausea, vomiting and GERD.   Genitourinary:  Negative for flank  pain.   Skin:  Negative for rash.   Neurological:  Negative for dizziness and headache.   Psychiatric/Behavioral:  Negative for sleep disturbance, suicidal ideas and depressed mood. The patient is not nervous/anxious.               Follow Up   Return in about 2 months (around 9/24/2025) for Annual physical.    Part of this note may be an electronic transcription/translation of spoken language to printed   text using the Dragon Dictation System.              Health Maintenance   Topic Date Due    COVID-19 Vaccine (4 - 2024-25 season) 01/24/2026 (Originally 9/1/2024)    Pneumococcal Vaccine 0-49 (1 of 2 - PCV) 02/18/2026 (Originally 6/1/1996)    ANNUAL PHYSICAL  08/15/2025    INFLUENZA VACCINE  10/01/2025    COLORECTAL CANCER SCREENING  07/26/2029    TDAP/TD VACCINES (2 - Td or Tdap) 07/25/2033    HEPATITIS C SCREENING  Completed          Medical History:  Medications Discontinued During This Encounter   Medication Reason    phentermine (ADIPEX-P) 37.5 MG tablet Reorder        Past Medical History:    ADHD (attention deficit hyperactivity disorder)    Unsure when first noticed    Allergic    Started out hay fever, now just build up in nack of throat    Anxiety    Back pain    CHF (congestive heart failure)    Not bad at moment, likely result of sleep apnea    Colon polyp    Had one. Was removed    Depression    Erectile dysfunction    GERD (gastroesophageal reflux disease)    My symptoms could be caused by my CHF cause upper GI scope was normal    Head injury    Hernia    Estimate    History of medical problems    Developed the shingles and have been off work since the 19th.    Lumbar herniated disc    Lumbosacral disc disease    Obesity    Obstructive sleep apnea    C-PAP mask not working/ not currently treating    Panic disorder    PTSD (post-traumatic stress disorder)    Scoliosis    Spinal headache    Had issues with neck pain    Work related injury    reinjured 8-13-15     Past Surgical History:    COLONOSCOPY     Procedure: COLONOSCOPY WITH POLYPECTOMY;  Surgeon: Pranav Granger MD;  Location: [facility] END[facility]COPY;  Service: General;  Laterality: N/A;  DIVERTICULOSIS. COLON POLYP    ENDOSCOPY    Procedure: ESOPHAGOGASTRODUODENOSCOPY with biopsies;  Surgeon: Pranav Granger MD;  Location: [facility];  Service: General;  Laterality: N/A;  NORMAL    LUMBAR DECOMPRESSION    JEH  L4-5 lumbar decomp disckectomy and TLIF  sextant mtrix and stealth    LUMBAR FUSION    Procedure: RT L4-5 LUMBAR DECOMPRESSION DISCECTOMY AND TLIF SEXTANT METRIX AND STEALTH;  Surgeon: Celestino Mackay MD;  Location:  [facility];  Service:     PLANTAR FASCIA RELEASE    Procedure: LEFT FOOT ENDOSCOPIC PLANTAR FASCIOTOMY;  Surgeon: Rafy Bauer DPM;  [place] [facility];  Service: Podiatry;  Laterality: Left;    SPINE SURGERY    lumbar spinal diskectomy L4/L5  L5/S1 2009    WISDOM TOOTH EXTRACTION      Family History   Problem Relation Age of Onset    Diabetes Father     Anxiety disorder Son         Kvng    ADD / ADHD Son     Depression Son     Mental illness Son     Learning disabilities Son         Adhd    Kidney disease Mother         Was on home dialysis    Other Mother         Had the shingles which spread to her brain and developed encephalitis     Social History     Tobacco Use    Smoking status: Never     Passive exposure: Never    Smokeless tobacco: Never   Substance Use Topics    Alcohol use: No       There are no preventive care reminders to display for this patient.       Immunization History   Administered Date(s) Administered    COVID-19 (PFIZER) 12YRS+ (COMIRNATY) 01/23/2024    COVID-19 (PFIZER) Purple Cap Monovalent 04/02/2021, 04/28/2021    Flu Vaccine Intradermal Quad 18-64YR 10/19/2021    Influenza Injectable Mdck Pf Quad 10/19/2021, 10/25/2022, 09/28/2023    Influenza, Unspecified 10/25/2022    Tdap 07/25/2023       No Known Allergies   Answers submitted by the patient for this visit:  Chronic Condition  Follow-up (Submitted on 7/17/2025)  Chief Complaint: PCP follow-up  other: Yes  Medication compliance: all of the time  Treatment barriers: no complaince problems  Exercise: daily

## 2025-07-24 NOTE — ASSESSMENT & PLAN NOTE
Improved with weight loss, not currently on medication and no acute issues.  He is to avoid spicy acidic food in his diet.

## 2025-07-29 DIAGNOSIS — E66.09 CLASS 1 OBESITY DUE TO EXCESS CALORIES WITHOUT SERIOUS COMORBIDITY WITH BODY MASS INDEX (BMI) OF 31.0 TO 31.9 IN ADULT: ICD-10-CM

## 2025-07-29 DIAGNOSIS — Z71.3 WEIGHT LOSS COUNSELING, ENCOUNTER FOR: ICD-10-CM

## 2025-07-29 DIAGNOSIS — E66.811 CLASS 1 OBESITY DUE TO EXCESS CALORIES WITHOUT SERIOUS COMORBIDITY WITH BODY MASS INDEX (BMI) OF 31.0 TO 31.9 IN ADULT: ICD-10-CM

## 2025-07-30 RX ORDER — METFORMIN HYDROCHLORIDE 500 MG/1
500 TABLET, EXTENDED RELEASE ORAL EVERY 12 HOURS SCHEDULED
Qty: 60 TABLET | Refills: 1 | Status: SHIPPED | OUTPATIENT
Start: 2025-07-30

## (undated) DEVICE — SINGLE-USE BIOPSY FORCEPS: Brand: RADIAL JAW 4

## (undated) DEVICE — SUT ETHLN 4/0 PS2 18IN 1667H

## (undated) DEVICE — TP CAST SCOTCHCAST PLS 4IN 4YD WHT

## (undated) DEVICE — Device

## (undated) DEVICE — APPL CHLORAPREP HI/LITE 26ML ORNG

## (undated) DEVICE — PK MINOR PROCEDURE 46

## (undated) DEVICE — DISPOSABLE TOURNIQUET CUFF SINGLE BLADDER, SINGLE PORT AND QUICK CONNECT CONNECTOR: Brand: COLOR CUFF

## (undated) DEVICE — SOL IRR H2O BTL 1000ML STRL

## (undated) DEVICE — LINER SURG CANSTR SXN S/RIGD 1500CC

## (undated) DEVICE — TUBING, SUCTION, 1/4" X 10', STRAIGHT: Brand: MEDLINE

## (undated) DEVICE — TRIANGLE KNIFE BOX OF 6: Brand: ECTRA

## (undated) DEVICE — SOL IRRG H2O PL/BG 1000ML STRL

## (undated) DEVICE — 1010 S-DRAPE TOWEL DRAPE 10/BX: Brand: STERI-DRAPE™

## (undated) DEVICE — DRAPE,REIN 53X77,STERILE: Brand: MEDLINE

## (undated) DEVICE — NDL HYPO PRECISIONGLIDE REG 25G 1 1/2

## (undated) DEVICE — NDL HYPO PRECISIONGLIDE REG 19G 1 1/2

## (undated) DEVICE — Device: Brand: BLACK EYE

## (undated) DEVICE — BNDG ESMARK 4IN 12FT LF STRL BLU

## (undated) DEVICE — THE SINGLE USE ETRAP – POLYP TRAP IS USED FOR SUCTION RETRIEVAL OF ENDOSCOPICALLY REMOVED POLYPS.: Brand: ETRAP

## (undated) DEVICE — SOLIDIFIER LIQLOC PLS 1500CC BT

## (undated) DEVICE — DRAPE,EXTREMITY,89X128,STERILE: Brand: MEDLINE

## (undated) DEVICE — SYR LL TP 10ML STRL

## (undated) DEVICE — SPNG GZ WOVN 4X4IN 12PLY 10/BX STRL

## (undated) DEVICE — SNAR POLYP CAPTIFLEX XS/OVL 11X2.4MM 240CM 1P/U

## (undated) DEVICE — STERILE POLYISOPRENE POWDER-FREE SURGICAL GLOVES: Brand: PROTEXIS

## (undated) DEVICE — BLCK/BITE BLOX WO/DENTL/RIM W/STRAP 54F

## (undated) DEVICE — BANDAGE ROLL,100% COTTON, 6 PLY, LARGE: Brand: KERLIX

## (undated) DEVICE — Device: Brand: DEFENDO AIR/WATER/SUCTION AND BIOPSY VALVE

## (undated) DEVICE — DRSNG WND GZ CURAD OIL EMULSION 3X3IN STRL

## (undated) DEVICE — STERILE POLYISOPRENE POWDER-FREE SURGICAL GLOVES WITH EMOLLIENT COATING: Brand: PROTEXIS

## (undated) DEVICE — CONN JET HYDRA H20 AUXILIARY DISP